# Patient Record
Sex: FEMALE | Race: WHITE | Employment: FULL TIME | URBAN - METROPOLITAN AREA
[De-identification: names, ages, dates, MRNs, and addresses within clinical notes are randomized per-mention and may not be internally consistent; named-entity substitution may affect disease eponyms.]

---

## 2017-01-12 ENCOUNTER — OFFICE VISIT (OUTPATIENT)
Dept: OBGYN | Facility: OTHER | Age: 22
End: 2017-01-12
Payer: COMMERCIAL

## 2017-01-12 VITALS
SYSTOLIC BLOOD PRESSURE: 120 MMHG | DIASTOLIC BLOOD PRESSURE: 74 MMHG | HEART RATE: 64 BPM | WEIGHT: 143 LBS | BODY MASS INDEX: 26.59 KG/M2

## 2017-01-12 DIAGNOSIS — Z97.5 PRESENCE OF INTRAUTERINE CONTRACEPTIVE DEVICE: ICD-10-CM

## 2017-01-12 DIAGNOSIS — Z30.430 ENCOUNTER FOR IUD INSERTION: Primary | ICD-10-CM

## 2017-01-12 PROCEDURE — 58300 INSERT INTRAUTERINE DEVICE: CPT | Performed by: ADVANCED PRACTICE MIDWIFE

## 2017-01-12 NOTE — PATIENT INSTRUCTIONS

## 2017-01-12 NOTE — PROGRESS NOTES
CC/HPI: Donald Duarte is a 21 year old who presents to the clinic for an IUD insertion.   Patient's last menstrual period was 12/12/2016 (approximate).. Has not been sexually active for two years.    I have reviewed the risks of the IUD including pregnancy, PID, life threatening infection, perforation, expulsion, cramping, changes in bleeding and ovarian cysts. Benefits of the IUD and alternative family planning methods have been discussed.  Patients questions have been answered.  Patient has verbalized understanding of risks and benefits and has signed the consent form.    Allergies   Allergen Reactions     No Known Allergies      Current Outpatient Prescriptions   Medication Sig Dispense Refill     levonorgestrel (MIRENA) 20 MCG/24HR IUD 1 each (20 mcg) by Intrauterine route continuous       amphetamine-dextroamphetamine (ADDERALL) 10 MG per tablet Take 1 tablet (10 mg) by mouth 2 times daily 60 tablet 0      Past Medical History   Diagnosis Date     DENISE (generalized anxiety disorder) 11/30/2013     Family History   Problem Relation Age of Onset     Hypertension Mother      Arthritis Paternal Grandmother      lupus     Breast Cancer Paternal Aunt      Social History     Social History     Marital Status: Single     Spouse Name: N/A     Number of Children: N/A     Years of Education: N/A     Occupational History     Not on file.     Social History Main Topics     Smoking status: Never Smoker      Smokeless tobacco: Never Used     Alcohol Use: No     Drug Use: No     Sexual Activity: No     Other Topics Concern     Parent/Sibling W/ Cabg, Mi Or Angioplasty Before 65f 55m? No     Social History Narrative     Past Surgical History   Procedure Laterality Date     Dental surgery       wisdom teeth         EXAM:  /74 mmHg  Pulse 64  Wt 143 lb (64.864 kg)  LMP 12/12/2016 (Approximate)  PELVIC EXAM:  Vulva: No external lesions, normal hair distribution, no adenopathy, BUS WNL  Vagina: Moist, pink, no abnormal  discharge, well rugated, no lesions  Cervix:, smooth, pink, no visible lesions, neg CMT  Uterus: Normal size, mid to retroverted, non-tender, mobile  Ovaries: No mass, non-tender, mobile  Rectal exam: deferred    IUD type: Mirena  Lot # [id number]  NDC# [id number] Mirena    EXP DATE:   2/19        Procedure:  Uterus assessed for position and is Midposition to Retroverted.  Speculum inserted.  Betadine prep of cervix done.  Tenaculum applied at  10/2  o'clock position and gentle traction was appiled to elongate the cervical canal.  Uterus sounded to 7 cm's.   IUD inserted in the usual fashion without significant resistance, severe protracted pain or excessive bleeding. The tenaculum was removed with scant bleeding from the puncture sites that was managed with some direct pressure using Sanchez swabs. Strings trimmed to 3 cm's.  Patient  tolerated the procedure well without any prolonged pain or syncopy.    ASSESSMENT/ PLAN:  (Z30.430) Encounter for IUD insertion  (primary encounter diagnosis)  Comment:   Plan: HC LEVONORGESTREL IU 52MG 5 YR, levonorgestrel         (MIRENA) 20 MCG/24HR IUD, INSERTION         INTRAUTERINE DEVICE            (Z97.5) Presence of intrauterine contraceptive device  Comment:   Plan: HC LEVONORGESTREL IU 52MG 5 YR, levonorgestrel         (MIRENA) 20 MCG/24HR IUD, INSERTION         INTRAUTERINE DEVICE                Instructions given to patient regarding checking IUD strings, returning to the clinic if pain or inability to check strings and/or irregular bleeding.  Return to the clinic in one month for IUD follow up   See AVS for complete instructions

## 2017-01-12 NOTE — MR AVS SNAPSHOT
After Visit Summary   1/12/2017    Donald Duarte    MRN: [id number]           Patient Information     Date Of Birth          1995        Visit Information        Provider Department      1/12/2017 2:00 PM [facility]     Encounter for IUD insertion    -  1     Presence of intrauterine contraceptive device           Care Instructions    What Mirena Users May Expect    What to watch for right after Mirena is placed  Some women may experience uterine cramps, bleeding, and/or dizziness during and right after Mirena is placed. To help minimize the cramps, you may taken ibuprofen 600 mg with food prior to and every 6 hours after your appointment if needed. These symptoms should improve over the next 24 hours.  Mild cramping may be present for a few days after your placement  As a follow up, you should visit your clinic once in the first 4 to 12 weeks after Mirena is placed to make sure it is in the right position. After that, Mirena can be checked once a year as part of your routine exam.    Please use a back-up method (abstinence or condoms) for 5 days after placement.    Your periods may change  For the first 3 to 6 months, your monthly period may become irregular. You may also have frequent spotting or light bleeding. A few women have heavy bleeding during this time. After your body adjusts, the number of bleeding days is likely to decrease (but may remain irregular), and you may even find that your periods stop altogether for as long as Mirena is in place. Around the end of the third month of use, you may see up to a 75% reduction in the amount of menstrual bleeding. By one year, about 1 out of 5 users may hay have no period at all. At the end of two years, 70% have little or no bleeding. Your periods will return rapidly once Mirena is removed.     Mirena Strings  You may check your own Mirena strings by inserting a finger into  "the vagina and feeling the strings as they exit the cervix.  The strings will initially feel firm, like fishing line, but will soften over a few weeks.  After the strings have softened, you or your partner should not be able to feel the strings during intercourse.  If you can feel the IUD, see your healthcare provider to have the position confirmed.  You may use tampons with Mirena in place.    Mirena does not protect against HIV or STDs.  Mirena does not prevent the formation of ovarian cysts.  Mirena does not typically reduce acne or cause weight gain or mood changes.    Please call [facility] [place] [phone] or [place] [phone] if you have questions or concerns.    For more information:  [email or web address]          Follow-ups after your visit        Who to contact     If you have questions or need follow up information about today's clinic visit or your schedule please contact [facility] directly at [phone].  Normal or non-critical lab and imaging results will be communicated to you by [employer], letter or phone within 4 business days after the clinic has received the results. If you do not hear from us within 7 days, please contact the clinic through MyChart or phone. If you have a critical or abnormal lab result, we will notify you by phone as soon as possible.  Submit refill requests through [employer] or call your pharmacy and they will forward the refill request to us. Please allow 3 business days for your refill to be completed.          Additional Information About Your Visit        [employer] Information     [employer] lets you send messages to your doctor, view your test results, renew your prescriptions, schedule appointments and more. To sign up, go to [email or web address] . Click on \"Log in\" on the left side of the screen, which will take you to the Welcome page. Then click on \"Sign up Now\" on the right side of the page.     You will be asked to enter the " access code listed below, as well as some personal information. Please follow the directions to create your username and password.     Your access code is: [email or web address]  Expires: 2017  1:29 PM     Your access code will  in 90 days. If you need help or a new code, please call your [facility] or [phone].        Care EveryWhere ID     This is your Care EveryWhere ID. This could be used by other organizations to access your [place] medical records  [id number]        Your Vitals Were     Pulse Last Period                64 2016 (Approximate)           Blood Pressure from Last 3 Encounters:   17 120/74   16 100/64   16 108/68    Weight from Last 3 Encounters:   17 143 lb (64.864 kg)   16 148 lb (67.132 kg)   16 149 lb (67.586 kg)              We Performed the Following     HC LEVONORGESTREL IU 52MG 5 YR     INSERTION INTRAUTERINE DEVICE          Today's Medication Changes          These changes are accurate as of: 17  2:35 PM.  If you have any questions, ask your nurse or doctor.               Start taking these medicines.        Dose/Directions    levonorgestrel 20 MCG/24HR IUD   Commonly known as:  MIRENA   Used for:  Encounter for IUD insertion, Presence of intrauterine contraceptive device   Started by:  Ignacia Dow APRN CNM        Dose:  1 each   1 each (20 mcg) by Intrauterine route continuous   Refills:  0            Where to get your medicines      Some of these will need a paper prescription and others can be bought over the counter.  Ask your nurse if you have questions.     You don't need a prescription for these medications    - levonorgestrel 20 MCG/24HR IUD             Primary Care Provider Office Phone # Fax #    Latoya Elena [phone]       [facility] [address]  [facility] [address]        Thank you!     Thank you for choosing [facility]  for your care. Our goal is  always to provide you with excellent care. Hearing back from our patients is one way we can continue to improve our services. Please take a few minutes to complete the written survey that you may receive in the mail after your visit with us. Thank you!             Your Updated Medication List - Protect others around you: Learn how to safely use, store and throw away your medicines at www.disposemymeds.org.          This list is accurate as of: 1/12/17  2:35 PM.  Always use your most recent med list.                   Brand Name Dispense Instructions for use    amphetamine-dextroamphetamine 10 MG per tablet    ADDERALL    60 tablet    Take 1 tablet (10 mg) by mouth 2 times daily       levonorgestrel 20 MCG/24HR IUD    MIRENA     1 each (20 mcg) by Intrauterine route continuous

## 2017-01-12 NOTE — NURSING NOTE
Patient given 800mg Ibuprofen and warm pack for cramping following IUD insertion.  Antonina Ayala CMA

## 2017-01-12 NOTE — NURSING NOTE
"Chief Complaint   Patient presents with     Contraception     would like to discuss different birth control options       Initial /74 mmHg  Pulse 64  Wt 143 lb (64.864 kg)  LMP 12/12/2016 (Approximate) Estimated body mass index is 26.59 kg/(m^2) as calculated from the following:    Height as of 12/2/16: 5' 1.5\" (1.562 m).    Weight as of this encounter: 143 lb (64.864 kg).  BP completed using cuff size: regular    No obstetric history on file.    The following HM Due: NONE      The following patient reported/Care Every where data was sent to:  P ABSTRACT QUALITY INITIATIVES [[id number]]       patient has appointment for today  Antonina Ayala CMA                   "

## 2017-02-01 ENCOUNTER — TELEPHONE (OUTPATIENT)
Dept: FAMILY MEDICINE | Facility: OTHER | Age: 22
End: 2017-02-01

## 2017-02-01 DIAGNOSIS — F90.2 ATTENTION DEFICIT HYPERACTIVITY DISORDER (ADHD), COMBINED TYPE: Primary | ICD-10-CM

## 2017-02-01 NOTE — TELEPHONE ENCOUNTER
[place]:  reviewed- Last fill was 11/4/16. She should still have a script from 12/2/16, please clarify this with her and what dose she is currently taking.     Cori Lynne, RN, BSN

## 2017-02-01 NOTE — TELEPHONE ENCOUNTER
Controlled Substance Refill Request for adderall  Problem List Complete:  No     PROVIDER TO CONSIDER COMPLETION OF PROBLEM LIST AND OVERVIEW/CONTROLLED SUBSTANCE AGREEMENT    Last Written Prescription Date:  12/02/16  Last Fill Quantity: 60,   # refills: 0    Last Office Visit with [facility] primary care provider: 12/02/16    Future Office visit:     Controlled substance agreement on file: Yes:  Date 11/04/16.     Processing:  Patient will  in clinic   checked in past 6 months?  No, route to RN

## 2017-02-01 NOTE — TELEPHONE ENCOUNTER
Reason for call:  Medication  Reason for Call:  Medication or medication refill:    Do you use a [facility]?  Name of the pharmacy and phone [facility] for the [facility] [phone]    Name of the medication requested: amphetamine-dextroamphetamine (ADDERALL) 10 MG per     Other request: patient states this medication is working very well for her.  Please call once ready for her to     Can we leave a detailed message on this number? YES    Phone number patient can be reached at: Cell number on file:    Telephone Information:   Mobile [phone]       Best Time: any    Call taken on 2/1/2017 at 1:41 PM by Kierra Del Toro

## 2017-02-01 NOTE — Clinical Note
[address]  [facility] [address] [phone]      February 7, 2017      Donald Duarte  [address] [facility] [address]        Dear Donald,    We have attempted several times by phone to give you this message: You  can  your  script for Adderall here at our clinic. I prescribed 3 months worth. If you are  still doing well after 3 months you  may repeat the refill for an additional 3 months and return to clinic in July or end of June 2017 for follow up.        Sincerely,    Nellie Kathleen CNP

## 2017-02-02 NOTE — TELEPHONE ENCOUNTER
Patient states she is currently taking 10mg BID.  [facility] pharmacist in [place] states this Rx was filled on 12/2/16.

## 2017-02-03 ENCOUNTER — TELEPHONE (OUTPATIENT)
Dept: FAMILY MEDICINE | Facility: OTHER | Age: 22
End: 2017-02-03

## 2017-02-03 RX ORDER — DEXTROAMPHETAMINE SACCHARATE, AMPHETAMINE ASPARTATE, DEXTROAMPHETAMINE SULFATE AND AMPHETAMINE SULFATE 2.5; 2.5; 2.5; 2.5 MG/1; MG/1; MG/1; MG/1
10 TABLET ORAL 2 TIMES DAILY
Qty: 180 TABLET | Refills: 0 | Status: SHIPPED | OUTPATIENT
Start: 2017-02-03 | End: 2017-07-17

## 2017-02-03 NOTE — TELEPHONE ENCOUNTER
Please let Donald know that she can  her rx for adderall here at our clinic. I prescribed 3 months worth. If she is still doing well after 3 months she may repeat the refill for an additional 3 months and return to clinic in July or end of June 2017 for follow up.   Thank you  Nellie Wang CNP

## 2017-02-03 NOTE — TELEPHONE ENCOUNTER
Adderrall encounter closed. See other telephone encounter.     Left detailed message, left rx at  for pt to .

## 2017-02-07 NOTE — TELEPHONE ENCOUNTER
Left msg for Donald to call the clinic back. 3rd attemp, letter will also be sent out, encounter will be closed.   Taylor Chaparro, CMA

## 2017-05-26 ENCOUNTER — HEALTH MAINTENANCE LETTER (OUTPATIENT)
Age: 22
End: 2017-05-26

## 2017-06-26 ENCOUNTER — OFFICE VISIT (OUTPATIENT)
Dept: FAMILY MEDICINE | Facility: OTHER | Age: 22
End: 2017-06-26
Payer: COMMERCIAL

## 2017-06-26 VITALS
RESPIRATION RATE: 16 BRPM | HEART RATE: 62 BPM | TEMPERATURE: 98.5 F | HEIGHT: 62 IN | SYSTOLIC BLOOD PRESSURE: 124 MMHG | BODY MASS INDEX: 26.02 KG/M2 | WEIGHT: 141.4 LBS | DIASTOLIC BLOOD PRESSURE: 62 MMHG

## 2017-06-26 DIAGNOSIS — F90.2 ATTENTION DEFICIT HYPERACTIVITY DISORDER (ADHD), COMBINED TYPE: Primary | ICD-10-CM

## 2017-06-26 PROCEDURE — 99213 OFFICE O/P EST LOW 20 MIN: CPT | Performed by: NURSE PRACTITIONER

## 2017-06-26 PROCEDURE — 80307 DRUG TEST PRSMV CHEM ANLYZR: CPT | Mod: 90 | Performed by: NURSE PRACTITIONER

## 2017-06-26 PROCEDURE — 99000 SPECIMEN HANDLING OFFICE-LAB: CPT | Performed by: NURSE PRACTITIONER

## 2017-06-26 RX ORDER — DEXTROAMPHETAMINE SACCHARATE, AMPHETAMINE ASPARTATE, DEXTROAMPHETAMINE SULFATE AND AMPHETAMINE SULFATE 2.5; 2.5; 2.5; 2.5 MG/1; MG/1; MG/1; MG/1
10 TABLET ORAL DAILY
Qty: 30 TABLET | Refills: 0 | Status: SHIPPED | OUTPATIENT
Start: 2017-08-20 | End: 2017-09-19

## 2017-06-26 RX ORDER — DEXTROAMPHETAMINE SACCHARATE, AMPHETAMINE ASPARTATE, DEXTROAMPHETAMINE SULFATE AND AMPHETAMINE SULFATE 2.5; 2.5; 2.5; 2.5 MG/1; MG/1; MG/1; MG/1
10 TABLET ORAL DAILY
Qty: 30 TABLET | Refills: 0 | Status: SHIPPED | OUTPATIENT
Start: 2017-07-20 | End: 2017-08-19

## 2017-06-26 RX ORDER — DEXTROAMPHETAMINE SACCHARATE, AMPHETAMINE ASPARTATE, DEXTROAMPHETAMINE SULFATE AND AMPHETAMINE SULFATE 2.5; 2.5; 2.5; 2.5 MG/1; MG/1; MG/1; MG/1
10 TABLET ORAL DAILY
Qty: 30 TABLET | Refills: 0 | Status: SHIPPED | OUTPATIENT
Start: 2017-09-20 | End: 2017-10-17 | Stop reason: DRUGHIGH

## 2017-06-26 ASSESSMENT — PAIN SCALES - GENERAL: PAINLEVEL: NO PAIN (0)

## 2017-06-26 NOTE — PROGRESS NOTES
SUBJECTIVE:                                                    Donald Duarte is a 21 year old female who presents to clinic today for the following health issues:      HPI     SUBJECTIVE:  Patient is here today to recheck ADHD/ADD.    Updates since last visit: things are going well   Routine for taking medicine, including time: 5:30 am and the 2nd around 11:30 or 12  Time medicine wears off: definitely wears off around lunch time so she takes the second dose after lunch and then the second dose wears off around 4 - 5 pm   Issues at school/Work: none   Issues at home: none  Control of symptoms: yes     Side effects:  Headaches: No  Stomach aches: No  Irritability/mood swings: No  Difficulties with sleep: No  Social withdrawal: No  Unusual movements/tics: No  Decreased appetite: Yes, makes sure to eat before taking med     Other concerns: none    Problem list and histories reviewed & adjusted, as indicated.  Additional history: as documented    Patient Active Problem List   Diagnosis     DENISE (generalized anxiety disorder)     Papule of skin     Steppage gait     Pes valgus, unspecified laterality     Anxiety     Flu vaccine need     Attention deficit hyperactivity disorder (ADHD), combined type     Need for hepatitis B screening test     Presence of intrauterine contraceptive device     Past Surgical History:   Procedure Laterality Date     DENTAL SURGERY      wisdom teeth       Social History   Substance Use Topics     Smoking status: Never Smoker     Smokeless tobacco: Never Used     Alcohol use No     Family History   Problem Relation Age of Onset     Hypertension Mother      Arthritis Paternal Grandmother      lupus     Breast Cancer Paternal Aunt          Current Outpatient Prescriptions   Medication Sig Dispense Refill     [START ON 7/20/2017] amphetamine-dextroamphetamine (ADDERALL) 10 MG per tablet Take 1 tablet (10 mg) by mouth daily 30 tablet 0     [START ON 8/20/2017] amphetamine-dextroamphetamine  "(ADDERALL) 10 MG per tablet Take 1 tablet (10 mg) by mouth daily 30 tablet 0     [START ON 9/20/2017] amphetamine-dextroamphetamine (ADDERALL) 10 MG per tablet Take 1 tablet (10 mg) by mouth daily 30 tablet 0     amphetamine-dextroamphetamine (ADDERALL) 10 MG per tablet Take 1 tablet (10 mg) by mouth 2 times daily 180 tablet 0     levonorgestrel (MIRENA) 20 MCG/24HR IUD 1 each (20 mcg) by Intrauterine route continuous       Allergies   Allergen Reactions     No Known Allergies      BP Readings from Last 3 Encounters:   06/26/17 124/62   01/12/17 120/74   12/02/16 100/64    Wt Readings from Last 3 Encounters:   06/26/17 141 lb 6.4 oz (64.1 kg)   01/12/17 143 lb (64.9 kg)   12/02/16 148 lb (67.1 kg)        Labs reviewed in EPIC    ROS:  Constitutional, HEENT, cardiovascular, pulmonary, gi and gu systems are negative, except as otherwise noted.    OBJECTIVE:     /62 (BP Location: Right arm, Patient Position: Chair, Cuff Size: Adult Regular)  Pulse 62  Temp 98.5  F (36.9  C) (Oral)  Resp 16  Ht 5' 1.5\" (1.562 m)  Wt 141 lb 6.4 oz (64.1 kg)  BMI 26.28 kg/m2  Body mass index is 26.28 kg/(m^2).  GENERAL: healthy, alert and no distress  NECK: no adenopathy, no asymmetry, masses, or scars and thyroid normal to palpation  RESP: lungs clear to auscultation - no rales, rhonchi or wheezes  CV: regular rate and rhythm, normal S1 S2, no S3 or S4, no murmur, click or rub, no peripheral edema and peripheral pulses strong  ABDOMEN: soft, nontender, no hepatosplenomegaly, no masses and bowel sounds normal  MS: no gross musculoskeletal defects noted, no edema  PSYCH: mentation appears normal, affect normal/bright    Diagnostic Test Results:  Drug tox pending urine    ASSESSMENT/PLAN:       1. Attention deficit hyperactivity disorder (ADHD), combined type  Patient has been doing well on current dosing plan. We'll continue this plan of care. Given that her urine drug screen is appropriate we'll follow up in 6 months  - Pain " Drug Scr UR W Rptd Meds  - amphetamine-dextroamphetamine (ADDERALL) 10 MG per tablet; Take 1 tablet (10 mg) by mouth daily  Dispense: 30 tablet; Refill: 0  - amphetamine-dextroamphetamine (ADDERALL) 10 MG per tablet; Take 1 tablet (10 mg) by mouth daily  Dispense: 30 tablet; Refill: 0  - amphetamine-dextroamphetamine (ADDERALL) 10 MG per tablet; Take 1 tablet (10 mg) by mouth daily  Dispense: 30 tablet; Refill: 0    See Patient Instructions    JAMES Rivas [facility]

## 2017-06-26 NOTE — NURSING NOTE
"Chief Complaint   Patient presents with     A.D.H.D       Initial /62 (BP Location: Right arm, Patient Position: Chair, Cuff Size: Adult Regular)  Pulse 62  Temp 98.5  F (36.9  C) (Oral)  Resp 16  Ht 5' 1.5\" (1.562 m)  Wt 141 lb 6.4 oz (64.1 kg)  BMI 26.28 kg/m2 Estimated body mass index is 26.28 kg/(m^2) as calculated from the following:    Height as of this encounter: 5' 1.5\" (1.562 m).    Weight as of this encounter: 141 lb 6.4 oz (64.1 kg).  Medication Reconciliation: complete      "

## 2017-06-26 NOTE — MR AVS SNAPSHOT
"              After Visit Summary   2017    Donald Duarte    MRN: [id number]           Patient Information     Date Of Birth          1995        Visit Information        Provider Department      2017 3:10 PM Nellie Wang APRN CNP [facility]        Today's Diagnoses     Attention deficit hyperactivity disorder (ADHD), combined type    -  1      Care Instructions    Please follow up with me in clinic in 6 months.     Thank you   Nellie Wang CNP            Follow-ups after your visit        Who to contact     If you have questions or need follow up information about today's clinic visit or your schedule please contact [facility] directly at [phone].  Normal or non-critical lab and imaging results will be communicated to you by MyChart, letter or phone within 4 business days after the clinic has received the results. If you do not hear from us within 7 days, please contact the clinic through MyChart or phone. If you have a critical or abnormal lab result, we will notify you by phone as soon as possible.  Submit refill requests through [employer] or call your pharmacy and they will forward the refill request to us. Please allow 3 business days for your refill to be completed.          Additional Information About Your Visit        MyChart Information     [employer] lets you send messages to your doctor, view your test results, renew your prescriptions, schedule appointments and more. To sign up, go to [email or web address] . Click on \"Log in\" on the left side of the screen, which will take you to the Welcome page. Then click on \"Sign up Now\" on the right side of the page.     You will be asked to enter the access code listed below, as well as some personal information. Please follow the directions to create your username and password.     Your access code is: [email or web address]  Expires: 2017  1:57 PM     Your access code will  in 90 days. If you " "need help or a new code, please call your [place] clinic or [phone].        Care EveryWhere ID     This is your Care EveryWhere ID. This could be used by other organizations to access your [place] medical records  [id number]        Your Vitals Were     Pulse Temperature Respirations Height BMI (Body Mass Index)       62 98.5  F (36.9  C) (Oral) 16 5' 1.5\" (1.562 m) 26.28 kg/m2        Blood Pressure from Last 3 Encounters:   06/26/17 124/62   01/12/17 120/74   12/02/16 100/64    Weight from Last 3 Encounters:   06/26/17 141 lb 6.4 oz (64.1 kg)   01/12/17 143 lb (64.9 kg)   12/02/16 148 lb (67.1 kg)              We Performed the Following     Pain Drug Scr UR W Rptd Meds          Today's Medication Changes          These changes are accurate as of: 6/26/17  3:56 PM.  If you have any questions, ask your nurse or doctor.               These medicines have changed or have updated prescriptions.        Dose/Directions    * amphetamine-dextroamphetamine 10 MG per tablet   Commonly known as:  ADDERALL   This may have changed:  Another medication with the same name was added. Make sure you understand how and when to take each.   Used for:  Attention deficit hyperactivity disorder (ADHD), combined type   Changed by:  Nellie Wang APRN CNP        Dose:  10 mg   Take 1 tablet (10 mg) by mouth 2 times daily   Quantity:  180 tablet   Refills:  0       * amphetamine-dextroamphetamine 10 MG per tablet   Commonly known as:  ADDERALL   This may have changed:  You were already taking a medication with the same name, and this prescription was added. Make sure you understand how and when to take each.   Used for:  Attention deficit hyperactivity disorder (ADHD), combined type   Changed by:  Nellie Wang APRN CNP        Dose:  10 mg   Start taking on:  7/20/2017   Take 1 tablet (10 mg) by mouth daily   Quantity:  30 tablet   Refills:  0       * amphetamine-dextroamphetamine 10 MG per tablet   Commonly " known as:  ADDERALL   This may have changed:  You were already taking a medication with the same name, and this prescription was added. Make sure you understand how and when to take each.   Used for:  Attention deficit hyperactivity disorder (ADHD), combined type   Changed by:  Nellie Wang APRN CNP        Dose:  10 mg   Start taking on:  8/20/2017   Take 1 tablet (10 mg) by mouth daily   Quantity:  30 tablet   Refills:  0       * amphetamine-dextroamphetamine 10 MG per tablet   Commonly known as:  ADDERALL   This may have changed:  You were already taking a medication with the same name, and this prescription was added. Make sure you understand how and when to take each.   Used for:  Attention deficit hyperactivity disorder (ADHD), combined type   Changed by:  Nellie Wang APRN CNP        Dose:  10 mg   Start taking on:  9/20/2017   Take 1 tablet (10 mg) by mouth daily   Quantity:  30 tablet   Refills:  0       * Notice:  This list has 4 medication(s) that are the same as other medications prescribed for you. Read the directions carefully, and ask your doctor or other care provider to review them with you.         Where to get your medicines      Some of these will need a paper prescription and others can be bought over the counter.  Ask your nurse if you have questions.     Bring a paper prescription for each of these medications     amphetamine-dextroamphetamine 10 MG per tablet    amphetamine-dextroamphetamine 10 MG per tablet    amphetamine-dextroamphetamine 10 MG per tablet                Primary Care Provider Office Phone # Fax #    Latoya Elena [phone]       [facility] [address]  [facility] [address]        Equal Access to Services     [facility] AH: Lyudmila mariscal Sotata, walorene luqcornelio, qaybta kadelio obando. [facility] [phone].    ATENCIÓN: Si vern aldrich  disposición servicios gratuitos de asistencia lingüística. Jignesh torres [phone].    We comply with applicable federal civil rights laws and [place] laws. We do not discriminate on the basis of race, color, national origin, age, disability sex, sexual orientation or gender identity.            Thank you!     Thank you for choosing [facility]  for your care. Our goal is always to provide you with excellent care. Hearing back from our patients is one way we can continue to improve our services. Please take a few minutes to complete the written survey that you may receive in the mail after your visit with us. Thank you!             Your Updated Medication List - Protect others around you: Learn how to safely use, store and throw away your medicines at www.disposemymeds.org.          This list is accurate as of: 6/26/17  3:56 PM.  Always use your most recent med list.                   Brand Name Dispense Instructions for use Diagnosis    * amphetamine-dextroamphetamine 10 MG per tablet    ADDERALL    180 tablet    Take 1 tablet (10 mg) by mouth 2 times daily    Attention deficit hyperactivity disorder (ADHD), combined type       * amphetamine-dextroamphetamine 10 MG per tablet   Start taking on:  7/20/2017    ADDERALL    30 tablet    Take 1 tablet (10 mg) by mouth daily    Attention deficit hyperactivity disorder (ADHD), combined type       * amphetamine-dextroamphetamine 10 MG per tablet   Start taking on:  8/20/2017    ADDERALL    30 tablet    Take 1 tablet (10 mg) by mouth daily    Attention deficit hyperactivity disorder (ADHD), combined type       * amphetamine-dextroamphetamine 10 MG per tablet   Start taking on:  9/20/2017    ADDERALL    30 tablet    Take 1 tablet (10 mg) by mouth daily    Attention deficit hyperactivity disorder (ADHD), combined type       levonorgestrel 20 MCG/24HR IUD    MIRENA     1 each (20 mcg) by Intrauterine route continuous    Encounter for IUD insertion, Presence of  intrauterine contraceptive device       * Notice:  This list has 4 medication(s) that are the same as other medications prescribed for you. Read the directions carefully, and ask your doctor or other care provider to review them with you.

## 2017-07-04 LAB — PAIN DRUG SCR UR W RPTD MEDS: NORMAL

## 2017-07-14 NOTE — PROGRESS NOTES
SUBJECTIVE:                                                    Donald Duarte is a 21 year old female who presents to clinic today for the following health issues:      HPI    Medication Followup of Adderall    Taking Medication as prescribed: yes    Side Effects:  None    Medication Helping Symptoms:  yes   Patient states she is currently taking Adderall regular strength at 10 mg in the morning and 10 mg in the afternoon. She feels that her afternoon dose is wearing off around 5 or 6 PM and that she sometimes feels very emotional when this is wearing off. Mother stated to her that she seems introverted and down when she gets home from work.    Answers for HPI/ROS submitted by the patient on 7/17/2017   DENISE 7 TOTAL SCORE: 4      Patient states she is going to be starting school early this fall and is in need of immunization record which is not up-to-date at this point. She is going to check with her mother to see if there is some place that she may have had her immunizations done. The [facility] was checked.  We'll have labs drawn today that she needs for school related to her immunity.    Problem list and histories reviewed & adjusted, as indicated.  Additional history: as documented        BP Readings from Last 3 Encounters:   07/17/17 106/69   06/26/17 124/62   01/12/17 120/74    Wt Readings from Last 3 Encounters:   07/17/17 143 lb (64.9 kg)   06/26/17 141 lb 6.4 oz (64.1 kg)   01/12/17 143 lb (64.9 kg)                  Labs reviewed in EPIC    ROS:  Constitutional, HEENT, cardiovascular, pulmonary, gi and gu systems are negative, except as otherwise noted.    OBJECTIVE:     /69 (BP Location: Right arm, Patient Position: Chair, Cuff Size: Adult Regular)  Pulse 85  Temp 98.5  F (36.9  C) (Oral)  Resp 16  Wt 143 lb (64.9 kg)  SpO2 97%  BMI 26.58 kg/m2  Body mass index is 26.58 kg/(m^2).  GENERAL: healthy, alert and no distress  NECK: no adenopathy, no asymmetry, masses, or scars and thyroid normal to  palpation  RESP: lungs clear to auscultation - no rales, rhonchi or wheezes  CV: regular rate and rhythm, normal S1 S2, no S3 or S4, no murmur, click or rub, no peripheral edema and peripheral pulses strong  ABDOMEN: soft, nontender, no hepatosplenomegaly, no masses and bowel sounds normal  MS: no gross musculoskeletal defects noted, no edema  PSYCH: mentation appears normal, affect normal/bright    Diagnostic Test Results: pending    ASSESSMENT/PLAN:     1. Attention deficit hyperactivity disorder (ADHD), combined type  We'll have her start Adderall extended release at 20 mg every a.m. she usually gets up around 5:30. She will then take her Adderall regular dosing in the afternoon which should hold her a little bit later into the evening. If needed we can increase her Adderall XR to a max dose of 40 mg per day.  - amphetamine-dextroamphetamine (ADDERALL XR) 20 MG per 24 hr capsule; Take 1 capsule (20 mg) by mouth daily  Dispense: 30 capsule; Refill: 0  - amphetamine-dextroamphetamine (ADDERALL XR) 20 MG per 24 hr capsule; Take 1 capsule (20 mg) by mouth daily  Dispense: 30 capsule; Refill: 0  - amphetamine-dextroamphetamine (ADDERALL XR) 20 MG per 24 hr capsule; Take 1 capsule (20 mg) by mouth daily  Dispense: 30 capsule; Refill: 0    2. Screening examination for venereal disease  She is due for screening we'll add this to her lab profile today  - Chlamydia trachomatis PCR    3. Screening for measles  This is for school  - Rubeola Antibody IgG    4. Screening for endocrine/metabolic/immunity disorders  This is for school  - Rubeola Antibody IgG  - Mumps Antibody IgG  - Rubella Antibody IgG Quantitative  - Hepatitis B surface antigen      Home care instructions were reviewed with the patient. The risks, benefits and treatment options of prescribed medications or other treatments have been discussed with the patient. The patient verbalized their understanding and should call or follow up if no improvement or if they  develop further problems.    Patient Instructions   I will call you with lab results.     Please let us know when you find out about your immunization record and we will update your record here.     Please call clinic if continue to have symptoms and need to increase XR dose to 40 mg (this is max dose).    Thank you  JAMES Higgins CNP [facility]

## 2017-07-17 ENCOUNTER — OFFICE VISIT (OUTPATIENT)
Dept: FAMILY MEDICINE | Facility: OTHER | Age: 22
End: 2017-07-17
Payer: COMMERCIAL

## 2017-07-17 VITALS
BODY MASS INDEX: 26.58 KG/M2 | RESPIRATION RATE: 16 BRPM | SYSTOLIC BLOOD PRESSURE: 106 MMHG | HEART RATE: 85 BPM | DIASTOLIC BLOOD PRESSURE: 69 MMHG | OXYGEN SATURATION: 97 % | TEMPERATURE: 98.5 F | WEIGHT: 143 LBS

## 2017-07-17 DIAGNOSIS — Z13.228 SCREENING FOR ENDOCRINE/METABOLIC/IMMUNITY DISORDERS: ICD-10-CM

## 2017-07-17 DIAGNOSIS — Z11.59 SCREENING FOR MEASLES: ICD-10-CM

## 2017-07-17 DIAGNOSIS — Z13.0 SCREENING FOR ENDOCRINE/METABOLIC/IMMUNITY DISORDERS: ICD-10-CM

## 2017-07-17 DIAGNOSIS — Z13.29 SCREENING FOR ENDOCRINE/METABOLIC/IMMUNITY DISORDERS: ICD-10-CM

## 2017-07-17 DIAGNOSIS — Z11.3 SCREENING EXAMINATION FOR VENEREAL DISEASE: ICD-10-CM

## 2017-07-17 DIAGNOSIS — F90.2 ATTENTION DEFICIT HYPERACTIVITY DISORDER (ADHD), COMBINED TYPE: Primary | ICD-10-CM

## 2017-07-17 PROCEDURE — 86762 RUBELLA ANTIBODY: CPT | Performed by: NURSE PRACTITIONER

## 2017-07-17 PROCEDURE — 87340 HEPATITIS B SURFACE AG IA: CPT | Performed by: NURSE PRACTITIONER

## 2017-07-17 PROCEDURE — 86765 RUBEOLA ANTIBODY: CPT | Performed by: NURSE PRACTITIONER

## 2017-07-17 PROCEDURE — 87491 CHLMYD TRACH DNA AMP PROBE: CPT | Performed by: NURSE PRACTITIONER

## 2017-07-17 PROCEDURE — 99214 OFFICE O/P EST MOD 30 MIN: CPT | Performed by: NURSE PRACTITIONER

## 2017-07-17 PROCEDURE — 86735 MUMPS ANTIBODY: CPT | Performed by: NURSE PRACTITIONER

## 2017-07-17 PROCEDURE — 36415 COLL VENOUS BLD VENIPUNCTURE: CPT | Performed by: NURSE PRACTITIONER

## 2017-07-17 RX ORDER — DEXTROAMPHETAMINE SACCHARATE, AMPHETAMINE ASPARTATE MONOHYDRATE, DEXTROAMPHETAMINE SULFATE AND AMPHETAMINE SULFATE 5; 5; 5; 5 MG/1; MG/1; MG/1; MG/1
20 CAPSULE, EXTENDED RELEASE ORAL DAILY
Qty: 30 CAPSULE | Refills: 0 | Status: SHIPPED | OUTPATIENT
Start: 2017-07-17 | End: 2017-08-16

## 2017-07-17 RX ORDER — DEXTROAMPHETAMINE SACCHARATE, AMPHETAMINE ASPARTATE MONOHYDRATE, DEXTROAMPHETAMINE SULFATE AND AMPHETAMINE SULFATE 5; 5; 5; 5 MG/1; MG/1; MG/1; MG/1
20 CAPSULE, EXTENDED RELEASE ORAL DAILY
Qty: 30 CAPSULE | Refills: 0 | Status: SHIPPED | OUTPATIENT
Start: 2017-08-17 | End: 2017-09-16

## 2017-07-17 RX ORDER — DEXTROAMPHETAMINE SACCHARATE, AMPHETAMINE ASPARTATE MONOHYDRATE, DEXTROAMPHETAMINE SULFATE AND AMPHETAMINE SULFATE 5; 5; 5; 5 MG/1; MG/1; MG/1; MG/1
20 CAPSULE, EXTENDED RELEASE ORAL DAILY
Qty: 30 CAPSULE | Refills: 0 | Status: SHIPPED | OUTPATIENT
Start: 2017-09-17 | End: 2017-10-17

## 2017-07-17 ASSESSMENT — ANXIETY QUESTIONNAIRES
7. FEELING AFRAID AS IF SOMETHING AWFUL MIGHT HAPPEN: NOT AT ALL
GAD7 TOTAL SCORE: 4
GAD7 TOTAL SCORE: 4
5. BEING SO RESTLESS THAT IT IS HARD TO SIT STILL: SEVERAL DAYS
2. NOT BEING ABLE TO STOP OR CONTROL WORRYING: NOT AT ALL
3. WORRYING TOO MUCH ABOUT DIFFERENT THINGS: SEVERAL DAYS
6. BECOMING EASILY ANNOYED OR IRRITABLE: SEVERAL DAYS
7. FEELING AFRAID AS IF SOMETHING AWFUL MIGHT HAPPEN: NOT AT ALL
4. TROUBLE RELAXING: SEVERAL DAYS
1. FEELING NERVOUS, ANXIOUS, OR ON EDGE: NOT AT ALL
GAD7 TOTAL SCORE: 4

## 2017-07-17 ASSESSMENT — PAIN SCALES - GENERAL: PAINLEVEL: NO PAIN (0)

## 2017-07-17 ASSESSMENT — PATIENT HEALTH QUESTIONNAIRE - PHQ9
SUM OF ALL RESPONSES TO PHQ QUESTIONS 1-9: 7
SUM OF ALL RESPONSES TO PHQ QUESTIONS 1-9: 7

## 2017-07-17 NOTE — NURSING NOTE
"Chief Complaint   Patient presents with     A.D.H.D     Health Maintenance     mychart, chlamydia, ciro       Initial /69 (BP Location: Right arm, Patient Position: Chair, Cuff Size: Adult Regular)  Pulse 85  Temp 98.5  F (36.9  C) (Oral)  Resp 16  Wt 143 lb (64.9 kg)  SpO2 97%  BMI 26.58 kg/m2 Estimated body mass index is 26.58 kg/(m^2) as calculated from the following:    Height as of 6/26/17: 5' 1.5\" (1.562 m).    Weight as of this encounter: 143 lb (64.9 kg).  Medication Reconciliation: complete     Monique Mac CMA      "

## 2017-07-17 NOTE — MR AVS SNAPSHOT
After Visit Summary   7/17/2017    Donald Duarte    MRN: [id number]           Patient Information     Date Of Birth          1995        Visit Information        Provider Department      7/17/2017 2:30 PM Nellie Wang APRN CNP [facility]        Today's Diagnoses     Screening examination for venereal disease    -  1    Attention deficit hyperactivity disorder (ADHD), combined type        Screening for measles        Screening for endocrine/metabolic/immunity disorders          Care Instructions    I will call you with lab results.     Please let us know when you find out about your immunization record and we will update your record here.     Please call clinic if continue to have symptoms and need to increase XR dose to 40 mg (this is max dose).    Thank you  Nellie Wang CNP            Follow-ups after your visit        Your next 10 appointments already scheduled     Jul 17, 2017  2:30 PM CDT   [facility] with JAMES Durbin CNP   [facility]    [address] [place] [id number]   [phone]           Bring a current list of meds and any records pertaining to this visit.  For Physicals, please bring immunization records and any forms needing to be filled out.  Please arrive 10 minutes early to complete paperwork.              Who to contact     If you have questions or need follow up information about today's clinic visit or your schedule please contact [facility] directly at [phone].  Normal or non-critical lab and imaging results will be communicated to you by MyChart, letter or phone within 4 business days after the clinic has received the results. If you do not hear from us within 7 days, please contact the clinic through MyChart or phone. If you have a critical or abnormal lab result, we will notify you by phone as soon as possible.  Submit refill requests  "through [employer] or call your pharmacy and they will forward the refill request to us. Please allow 3 business days for your refill to be completed.          Additional Information About Your Visit        [employer] Information     [employer] lets you send messages to your doctor, view your test results, renew your prescriptions, schedule appointments and more. To sign up, go to [email or web address] . Click on \"Log in\" on the left side of the screen, which will take you to the Welcome page. Then click on \"Sign up Now\" on the right side of the page.     You will be asked to enter the access code listed below, as well as some personal information. Please follow the directions to create your username and password.     Your access code is: [email or web address]  Expires: 2017  1:57 PM     Your access code will  in 90 days. If you need help or a new code, please call your [place] clinic or [phone].        Care EveryWhere ID     This is your Care EveryWhere ID. This could be used by other organizations to access your [place] medical records  [id number]        Your Vitals Were     Pulse Temperature Respirations Pulse Oximetry BMI (Body Mass Index)       85 98.5  F (36.9  C) (Oral) 16 97% 26.58 kg/m2        Blood Pressure from Last 3 Encounters:   17 106/69   17 124/62   17 120/74    Weight from Last 3 Encounters:   17 143 lb (64.9 kg)   17 141 lb 6.4 oz (64.1 kg)   17 143 lb (64.9 kg)              We Performed the Following     Chlamydia trachomatis PCR     Hepatitis B surface antigen     Mumps Antibody IgG     Rubella Antibody IgG Quantitative     Rubeola Antibody IgG          Today's Medication Changes          These changes are accurate as of: 17  2:26 PM.  If you have any questions, ask your nurse or doctor.               These medicines have changed or have updated prescriptions.        Dose/Directions    * amphetamine-dextroamphetamine 20 MG per 24 hr capsule   Commonly " known as:  ADDERALL XR   This may have changed:  You were already taking a medication with the same name, and this prescription was added. Make sure you understand how and when to take each.   Used for:  Attention deficit hyperactivity disorder (ADHD), combined type   Changed by:  Nellie Wang APRN CNP        Dose:  20 mg   Take 1 capsule (20 mg) by mouth daily   Quantity:  30 capsule   Refills:  0       * amphetamine-dextroamphetamine 10 MG per tablet   Commonly known as:  ADDERALL   This may have changed:  Another medication with the same name was added. Make sure you understand how and when to take each.   Used for:  Attention deficit hyperactivity disorder (ADHD), combined type   Changed by:  Nellie Wang APRN CNP        Dose:  10 mg   Start taking on:  7/20/2017   Take 1 tablet (10 mg) by mouth daily   Quantity:  30 tablet   Refills:  0       * amphetamine-dextroamphetamine 20 MG per 24 hr capsule   Commonly known as:  ADDERALL XR   This may have changed:  You were already taking a medication with the same name, and this prescription was added. Make sure you understand how and when to take each.   Used for:  Attention deficit hyperactivity disorder (ADHD), combined type   Changed by:  Nellie Wang APRN CNP        Dose:  20 mg   Start taking on:  8/17/2017   Take 1 capsule (20 mg) by mouth daily   Quantity:  30 capsule   Refills:  0       * amphetamine-dextroamphetamine 10 MG per tablet   Commonly known as:  ADDERALL   This may have changed:  Another medication with the same name was added. Make sure you understand how and when to take each.   Used for:  Attention deficit hyperactivity disorder (ADHD), combined type   Changed by:  Nellie Wang APRN CNP        Dose:  10 mg   Start taking on:  8/20/2017   Take 1 tablet (10 mg) by mouth daily   Quantity:  30 tablet   Refills:  0       * amphetamine-dextroamphetamine 20 MG per 24 hr capsule   Commonly known as:   ADDERALL XR   This may have changed:  You were already taking a medication with the same name, and this prescription was added. Make sure you understand how and when to take each.   Used for:  Attention deficit hyperactivity disorder (ADHD), combined type   Changed by:  Nellie Wang APRN CNP        Dose:  20 mg   Start taking on:  9/17/2017   Take 1 capsule (20 mg) by mouth daily   Quantity:  30 capsule   Refills:  0       * amphetamine-dextroamphetamine 10 MG per tablet   Commonly known as:  ADDERALL   This may have changed:  Another medication with the same name was added. Make sure you understand how and when to take each.   Used for:  Attention deficit hyperactivity disorder (ADHD), combined type   Changed by:  Nellie Wang APRN CNP        Dose:  10 mg   Start taking on:  9/20/2017   Take 1 tablet (10 mg) by mouth daily   Quantity:  30 tablet   Refills:  0       * Notice:  This list has 6 medication(s) that are the same as other medications prescribed for you. Read the directions carefully, and ask your doctor or other care provider to review them with you.         Where to get your medicines      Some of these will need a paper prescription and others can be bought over the counter.  Ask your nurse if you have questions.     Bring a paper prescription for each of these medications     amphetamine-dextroamphetamine 20 MG per 24 hr capsule    amphetamine-dextroamphetamine 20 MG per 24 hr capsule    amphetamine-dextroamphetamine 20 MG per 24 hr capsule                Primary Care Provider Office Phone # Fax #    Latoya Elena [phone]       [address]  [facility] [address]        Equal Access to Services     [facility]: Lyudmila Quezada, ji serrano, qadelio betancourt. [facility] [phone].    ATENCIÓN: Si habla español, tiene a rosario disposición servicios  elena de asistencia lingüística. Jignesh torres [phone].    We comply with applicable federal civil rights laws and [place] laws. We do not discriminate on the basis of race, color, national origin, age, disability sex, sexual orientation or gender identity.            Thank you!     Thank you for choosing [facility]  for your care. Our goal is always to provide you with excellent care. Hearing back from our patients is one way we can continue to improve our services. Please take a few minutes to complete the written survey that you may receive in the mail after your visit with us. Thank you!             Your Updated Medication List - Protect others around you: Learn how to safely use, store and throw away your medicines at www.disposemymeds.org.          This list is accurate as of: 7/17/17  2:26 PM.  Always use your most recent med list.                   Brand Name Dispense Instructions for use Diagnosis    * amphetamine-dextroamphetamine 20 MG per 24 hr capsule    ADDERALL XR    30 capsule    Take 1 capsule (20 mg) by mouth daily    Attention deficit hyperactivity disorder (ADHD), combined type       * amphetamine-dextroamphetamine 10 MG per tablet   Start taking on:  7/20/2017    ADDERALL    30 tablet    Take 1 tablet (10 mg) by mouth daily    Attention deficit hyperactivity disorder (ADHD), combined type       * amphetamine-dextroamphetamine 20 MG per 24 hr capsule   Start taking on:  8/17/2017    ADDERALL XR    30 capsule    Take 1 capsule (20 mg) by mouth daily    Attention deficit hyperactivity disorder (ADHD), combined type       * amphetamine-dextroamphetamine 10 MG per tablet   Start taking on:  8/20/2017    ADDERALL    30 tablet    Take 1 tablet (10 mg) by mouth daily    Attention deficit hyperactivity disorder (ADHD), combined type       * amphetamine-dextroamphetamine 20 MG per 24 hr capsule   Start taking on:  9/17/2017    ADDERALL XR    30 capsule    Take 1 capsule (20 mg) by  mouth daily    Attention deficit hyperactivity disorder (ADHD), combined type       * amphetamine-dextroamphetamine 10 MG per tablet   Start taking on:  9/20/2017    ADDERALL    30 tablet    Take 1 tablet (10 mg) by mouth daily    Attention deficit hyperactivity disorder (ADHD), combined type       levonorgestrel 20 MCG/24HR IUD    MIRENA     1 each (20 mcg) by Intrauterine route continuous    Encounter for IUD insertion, Presence of intrauterine contraceptive device       * Notice:  This list has 6 medication(s) that are the same as other medications prescribed for you. Read the directions carefully, and ask your doctor or other care provider to review them with you.

## 2017-07-17 NOTE — PATIENT INSTRUCTIONS
I will call you with lab results.     Please let us know when you find out about your immunization record and we will update your record here.     Please call clinic if continue to have symptoms and need to increase XR dose to 40 mg (this is max dose).    Thank you  Nellie Wang CNP

## 2017-07-18 LAB — HBV SURFACE AG SERPL QL IA: NONREACTIVE

## 2017-07-18 ASSESSMENT — ANXIETY QUESTIONNAIRES: GAD7 TOTAL SCORE: 4

## 2017-07-18 ASSESSMENT — PATIENT HEALTH QUESTIONNAIRE - PHQ9: SUM OF ALL RESPONSES TO PHQ QUESTIONS 1-9: 7

## 2017-07-19 LAB
C TRACH DNA SPEC QL NAA+PROBE: NORMAL
MEV IGG SER QL IA: 1.4 AI (ref 0–0.8)
MUV IGG SER QL IA: 3.7 AI (ref 0–0.8)
RUBV IGG SERPL IA-ACNC: 119 IU/ML
SPECIMEN SOURCE: NORMAL

## 2017-07-20 DIAGNOSIS — Z23 NEED FOR VACCINATION: Primary | ICD-10-CM

## 2017-07-20 NOTE — PROGRESS NOTES
Please advise Donald Duarte,  1995,  [phone] (home)     Her immunizations were all good except she is non-reactive to hepatitis B this mean she has not been exposed to virus (has not been vaccinated) recommend vaccination for hepatits b.   Chlamydia was negative.   Thank you  Nellie Wang CNP

## 2017-07-20 NOTE — PROGRESS NOTES
Please advise Donald Duarte,  1995,  [phone] (home)     Her immunizations were all good except she is non-reactive to hepatitis B this mean she has not been exposed to virus (has not been vaccinated) recommend vaccination for hepatits b.   Chlamydia was negative.   Thank you  Nellie Wang CNP

## 2017-10-06 ENCOUNTER — HEALTH MAINTENANCE LETTER (OUTPATIENT)
Age: 22
End: 2017-10-06

## 2017-10-16 NOTE — PROGRESS NOTES
SUBJECTIVE:                                                    Donald Duarte is a 22 year old female who presents to clinic today for the following health issues:      HPI    Concern - Toe nail pain   Onset: for a while now     Description:   Big toe is ok but the other toes nails hurt on both feet. No recalled injury     ADHD follow up  SUBJECTIVE:  Patient is here today to recheck ADHD/ADD.    Updates since last visit: none   Routine for taking medicine, including time: 5 am and takes the 5mg around noon (if needed)  Time medicine wears off: 7pm if taking 2nd dose.  If just taking one dose then around 4pm  Issues at school/Work: none   Issues at home: none   Control of symptoms: well controlled     Side effects:  Headaches: No  Stomach aches: No  Irritability/mood swings: No  Difficulties with sleep: No  Social withdrawal: No  Unusual movements/tics: No  Decreased appetite: No    Other concerns: itchy eyes and season allergy symptoms         Problem list and histories reviewed & adjusted, as indicated.  Additional history: as documented    Patient Active Problem List   Diagnosis     DENISE (generalized anxiety disorder)     Papule of skin     Steppage gait     Pes valgus, unspecified laterality     Anxiety     Flu vaccine need     Attention deficit hyperactivity disorder (ADHD), combined type     Need for hepatitis B screening test     Presence of intrauterine contraceptive device     Past Surgical History:   Procedure Laterality Date     DENTAL SURGERY      wisdom teeth       Social History   Substance Use Topics     Smoking status: Never Smoker     Smokeless tobacco: Never Used     Alcohol use No     Family History   Problem Relation Age of Onset     Hypertension Mother      Arthritis Paternal Grandmother      lupus     Breast Cancer Paternal Aunt          Current Outpatient Prescriptions   Medication Sig Dispense Refill     terbinafine (KP TERBINAFINE HYDROCHLORIDE) 1 % cream Apply topically 2 times daily  for 14 days 15 g 1     [START ON 10/31/2017] amphetamine-dextroamphetamine (ADDERALL XR) 20 MG per 24 hr capsule Take 1 capsule (20 mg) by mouth daily 30 capsule 0     [START ON 12/1/2017] amphetamine-dextroamphetamine (ADDERALL XR) 20 MG per 24 hr capsule Take 1 capsule (20 mg) by mouth daily 30 capsule 0     [START ON 12/31/2017] amphetamine-dextroamphetamine (ADDERALL XR) 20 MG per 24 hr capsule Take 1 capsule (20 mg) by mouth daily 30 capsule 0     loratadine (CLARITIN) 10 MG tablet Take 1 tablet (10 mg) by mouth daily 30 tablet 1     Phenylephrine-Polyvinyl Alc (REFRESH REDNESS RELIEF) 0.12-1.4 % SOLN Apply 1 drop to eye 2 times daily as needed 1 Bottle 0     amphetamine-dextroamphetamine (ADDERALL XR) 20 MG per 24 hr capsule Take 1 capsule (20 mg) by mouth daily 30 capsule 0     levonorgestrel (MIRENA) 20 MCG/24HR IUD 1 each (20 mcg) by Intrauterine route continuous       BP Readings from Last 3 Encounters:   10/17/17 118/70   07/17/17 106/69   06/26/17 124/62    Wt Readings from Last 3 Encounters:   10/17/17 137 lb (62.1 kg)   07/17/17 143 lb (64.9 kg)   06/26/17 141 lb 6.4 oz (64.1 kg)        Labs reviewed in EPIC        ROS:  Constitutional, HEENT, cardiovascular, pulmonary, gi and gu systems are negative, except as otherwise noted.      OBJECTIVE:   /70 (BP Location: Left arm, Patient Position: Chair, Cuff Size: Adult Large)  Pulse 68  Temp 98.6  F (37  C) (Oral)  Resp 16  Wt 137 lb (62.1 kg)  BMI 25.47 kg/m2  Body mass index is 25.47 kg/(m^2).  GENERAL: healthy, alert and no distress  NECK: no adenopathy, no asymmetry, masses, or scars and thyroid normal to palpation  RESP: lungs clear to auscultation - no rales, rhonchi or wheezes  CV: regular rate and rhythm, normal S1 S2, no S3 or S4, no murmur, click or rub, no peripheral edema and peripheral pulses strong  ABDOMEN: soft, nontender, no hepatosplenomegaly, no masses and bowel sounds normal  MS: no gross musculoskeletal defects noted, no  edema  SKIN: tinea unguium bilateral toes  NEURO: Normal strength and tone, mentation intact and speech normal  PSYCH: mentation appears normal, affect normal/bright    Diagnostic Test Results:  none     ASSESSMENT/PLAN:     1. Attention deficit hyperactivity disorder (ADHD), combined type  Doing well will discontinue the 10 mg dose she had been taking midday.   - amphetamine-dextroamphetamine (ADDERALL XR) 20 MG per 24 hr capsule; Take 1 capsule (20 mg) by mouth daily  Dispense: 30 capsule; Refill: 0  - amphetamine-dextroamphetamine (ADDERALL XR) 20 MG per 24 hr capsule; Take 1 capsule (20 mg) by mouth daily  Dispense: 30 capsule; Refill: 0  - amphetamine-dextroamphetamine (ADDERALL XR) 20 MG per 24 hr capsule; Take 1 capsule (20 mg) by mouth daily  Dispense: 30 capsule; Refill: 0    2. Tinea unguium  Discussed treatment will try topical first if no relief of symptoms may need oral.   - terbinafine (KP TERBINAFINE HYDROCHLORIDE) 1 % cream; Apply topically 2 times daily for 14 days  Dispense: 15 g; Refill: 1    3. Chronic seasonal allergic rhinitis, unspecified trigger  Has taken Zyrtec OTC for multiple years and states that she does not feel this is helping anymore. Would like to try something else. She has been having itchy eyes. She states that in the morning and she wakes up its mainly her eyes that are itchy and then as she was on the third day she starts to have nasal drainage and sinusitis. At nighttime when she goes to bed she has stuffy nose. Recommend Claritin for daytime with refresh eyedrops and may take 1 Benadryl 25 mg tablet or half tablet at bedtime.  - loratadine (CLARITIN) 10 MG tablet; Take 1 tablet (10 mg) by mouth daily  Dispense: 30 tablet; Refill: 1  - Phenylephrine-Polyvinyl Alc (REFRESH REDNESS RELIEF) 0.12-1.4 % SOLN; Apply 1 drop to eye 2 times daily as needed  Dispense: 1 Bottle; Refill: 0    4. Need for prophylactic vaccination and inoculation against influenza    - FLU VAC, SPLIT VIRUS  IM > 3 YO (QUADRIVALENT) [65005]  - Vaccine Administration, Initial [72608]    Patient Instructions   Please start topical fungal medicine for toe nail pain. If symptoms do not improve or worsen may need to try oral.     Refills given with decreased rx for adderall.    Return to clinic for PAP when able.     Thank you  Nellie Wang, [facility]

## 2017-10-17 ENCOUNTER — OFFICE VISIT (OUTPATIENT)
Dept: FAMILY MEDICINE | Facility: OTHER | Age: 22
End: 2017-10-17
Payer: COMMERCIAL

## 2017-10-17 VITALS
SYSTOLIC BLOOD PRESSURE: 118 MMHG | RESPIRATION RATE: 16 BRPM | DIASTOLIC BLOOD PRESSURE: 70 MMHG | TEMPERATURE: 98.6 F | BODY MASS INDEX: 25.47 KG/M2 | WEIGHT: 137 LBS | HEART RATE: 68 BPM

## 2017-10-17 DIAGNOSIS — J30.2 CHRONIC SEASONAL ALLERGIC RHINITIS, UNSPECIFIED TRIGGER: ICD-10-CM

## 2017-10-17 DIAGNOSIS — B35.1 TINEA UNGUIUM: ICD-10-CM

## 2017-10-17 DIAGNOSIS — Z23 NEED FOR PROPHYLACTIC VACCINATION AND INOCULATION AGAINST INFLUENZA: ICD-10-CM

## 2017-10-17 DIAGNOSIS — F90.2 ATTENTION DEFICIT HYPERACTIVITY DISORDER (ADHD), COMBINED TYPE: Primary | ICD-10-CM

## 2017-10-17 PROCEDURE — 90471 IMMUNIZATION ADMIN: CPT | Performed by: NURSE PRACTITIONER

## 2017-10-17 PROCEDURE — 99214 OFFICE O/P EST MOD 30 MIN: CPT | Mod: 25 | Performed by: NURSE PRACTITIONER

## 2017-10-17 PROCEDURE — 90686 IIV4 VACC NO PRSV 0.5 ML IM: CPT | Performed by: NURSE PRACTITIONER

## 2017-10-17 RX ORDER — DEXTROAMPHETAMINE SACCHARATE, AMPHETAMINE ASPARTATE MONOHYDRATE, DEXTROAMPHETAMINE SULFATE AND AMPHETAMINE SULFATE 5; 5; 5; 5 MG/1; MG/1; MG/1; MG/1
20 CAPSULE, EXTENDED RELEASE ORAL DAILY
Qty: 30 CAPSULE | Refills: 0 | Status: SHIPPED | OUTPATIENT
Start: 2017-12-01 | End: 2017-12-31

## 2017-10-17 RX ORDER — DEXTROAMPHETAMINE SACCHARATE, AMPHETAMINE ASPARTATE MONOHYDRATE, DEXTROAMPHETAMINE SULFATE AND AMPHETAMINE SULFATE 5; 5; 5; 5 MG/1; MG/1; MG/1; MG/1
20 CAPSULE, EXTENDED RELEASE ORAL DAILY
Qty: 30 CAPSULE | Refills: 0 | Status: SHIPPED | OUTPATIENT
Start: 2017-10-31 | End: 2017-11-30

## 2017-10-17 RX ORDER — PRENATAL VIT 91/IRON/FOLIC/DHA 28-975-200
COMBINATION PACKAGE (EA) ORAL 2 TIMES DAILY
Qty: 15 G | Refills: 1 | Status: SHIPPED | OUTPATIENT
Start: 2017-10-17 | End: 2017-10-31

## 2017-10-17 RX ORDER — LORATADINE 10 MG/1
10 TABLET ORAL DAILY
Qty: 30 TABLET | Refills: 1 | Status: SHIPPED | OUTPATIENT
Start: 2017-10-17 | End: 2020-09-25

## 2017-10-17 RX ORDER — DEXTROAMPHETAMINE SACCHARATE, AMPHETAMINE ASPARTATE MONOHYDRATE, DEXTROAMPHETAMINE SULFATE AND AMPHETAMINE SULFATE 5; 5; 5; 5 MG/1; MG/1; MG/1; MG/1
20 CAPSULE, EXTENDED RELEASE ORAL DAILY
Qty: 30 CAPSULE | Refills: 0 | Status: SHIPPED | OUTPATIENT
Start: 2017-12-31 | End: 2018-01-30

## 2017-10-17 NOTE — MR AVS SNAPSHOT
After Visit Summary   10/17/2017    Donald Duarte    MRN: [id number]           Patient Information     Date Of Birth          1995        Visit Information        Provider Department      10/17/2017 2:40 PM Nellie Wang APRN CNP [facility]        Today's Diagnoses     Fungal toenail infection    -  1    Screening for malignant neoplasm of cervix        Need for prophylactic vaccination and inoculation against influenza        Attention deficit hyperactivity disorder (ADHD), combined type        Chronic seasonal allergic rhinitis, unspecified trigger          Care Instructions    Please start topical fungal medicine for toe nail pain. If symptoms do not improve or worsen may need to try oral.     Refills given with decreased rx for adderall.    Return to clinic for PAP when able.     Thank you  Nellie Wang CNP            Follow-ups after your visit        Who to contact     If you have questions or need follow up information about today's clinic visit or your schedule please contact [facility] directly at [phone].  Normal or non-critical lab and imaging results will be communicated to you by [employer], letter or phone within 4 business days after the clinic has received the results. If you do not hear from us within 7 days, please contact the clinic through [employer] or phone. If you have a critical or abnormal lab result, we will notify you by phone as soon as possible.  Submit refill requests through [employer] or call your pharmacy and they will forward the refill request to us. Please allow 3 business days for your refill to be completed.          Additional Information About Your Visit        [employer] Information     [employer] gives you secure access to your electronic health record. If you see a primary care provider, you can also send messages to your care team and make appointments. If you have questions, please call your primary care clinic.   If you do not have a primary care provider, please call [phone] and they will assist you.        Care EveryWhere ID     This is your Care EveryWhere ID. This could be used by other organizations to access your [place] medical records  [id number]        Your Vitals Were     Pulse Temperature Respirations BMI (Body Mass Index)          68 98.6  F (37  C) (Oral) 16 25.47 kg/m2         Blood Pressure from Last 3 Encounters:   10/17/17 118/70   07/17/17 106/69   06/26/17 124/62    Weight from Last 3 Encounters:   10/17/17 137 lb (62.1 kg)   07/17/17 143 lb (64.9 kg)   06/26/17 141 lb 6.4 oz (64.1 kg)              We Performed the Following     FLU VAC, SPLIT VIRUS IM > 3 YO (QUADRIVALENT) [42713]     Vaccine Administration, Initial [99557]          Today's Medication Changes          These changes are accurate as of: 10/17/17  3:39 PM.  If you have any questions, ask your nurse or doctor.               Start taking these medicines.        Dose/Directions    loratadine 10 MG tablet   Commonly known as:  CLARITIN   Used for:  Chronic seasonal allergic rhinitis, unspecified trigger   Started by:  Nellie Wang APRN CNP        Dose:  10 mg   Take 1 tablet (10 mg) by mouth daily   Quantity:  30 tablet   Refills:  1       Phenylephrine-Polyvinyl Alc 0.12-1.4 % Soln   Commonly known as:  REFRESH REDNESS RELIEF   Used for:  Chronic seasonal allergic rhinitis, unspecified trigger   Started by:  Nellie Wang APRN CNP        Dose:  1 drop   Apply 1 drop to eye 2 times daily as needed   Quantity:  1 Bottle   Refills:  0       terbinafine 1 % cream   Commonly known as:  KP TERBINAFINE HYDROCHLORIDE   Used for:  Fungal toenail infection   Started by:  Nellie Wang APRN CNP        Apply topically 2 times daily for 14 days   Quantity:  15 g   Refills:  1         These medicines have changed or have updated prescriptions.        Dose/Directions    * amphetamine-dextroamphetamine 20 MG per  24 hr capsule   Commonly known as:  ADDERALL XR   This may have changed:    - Another medication with the same name was added. Make sure you understand how and when to take each.  - Another medication with the same name was removed. Continue taking this medication, and follow the directions you see here.   Used for:  Attention deficit hyperactivity disorder (ADHD), combined type   Changed by:  Nellie Wang APRN CNP        Dose:  20 mg   Take 1 capsule (20 mg) by mouth daily   Quantity:  30 capsule   Refills:  0       * amphetamine-dextroamphetamine 20 MG per 24 hr capsule   Commonly known as:  ADDERALL XR   This may have changed:  You were already taking a medication with the same name, and this prescription was added. Make sure you understand how and when to take each.   Used for:  Attention deficit hyperactivity disorder (ADHD), combined type   Replaces:  amphetamine-dextroamphetamine 10 MG per tablet   Changed by:  Nellie Wang APRN CNP        Dose:  20 mg   Start taking on:  10/31/2017   Take 1 capsule (20 mg) by mouth daily   Quantity:  30 capsule   Refills:  0       * amphetamine-dextroamphetamine 20 MG per 24 hr capsule   Commonly known as:  ADDERALL XR   This may have changed:  You were already taking a medication with the same name, and this prescription was added. Make sure you understand how and when to take each.   Used for:  Attention deficit hyperactivity disorder (ADHD), combined type   Changed by:  Nellie Wang APRN CNP        Dose:  20 mg   Start taking on:  12/1/2017   Take 1 capsule (20 mg) by mouth daily   Quantity:  30 capsule   Refills:  0       * amphetamine-dextroamphetamine 20 MG per 24 hr capsule   Commonly known as:  ADDERALL XR   This may have changed:  You were already taking a medication with the same name, and this prescription was added. Make sure you understand how and when to take each.   Used for:  Attention deficit hyperactivity disorder  (ADHD), combined type   Changed by:  Nellie Wang APRN CNP        Dose:  20 mg   Start taking on:  12/31/2017   Take 1 capsule (20 mg) by mouth daily   Quantity:  30 capsule   Refills:  0       * Notice:  This list has 4 medication(s) that are the same as other medications prescribed for you. Read the directions carefully, and ask your doctor or other care provider to review them with you.      Stop taking these medicines if you haven't already. Please contact your care team if you have questions.     amphetamine-dextroamphetamine 10 MG per tablet   Commonly known as:  ADDERALL   Replaced by:  amphetamine-dextroamphetamine 20 MG per 24 hr capsule   You also have another medication with the same name that you need to continue taking as instructed.   Stopped by:  Nellie Wang APRN CNP                Where to get your medicines      These medications were sent to [facility] [address] [place] [address]     Phone:  [phone]     loratadine 10 MG tablet    Phenylephrine-Polyvinyl Alc 0.12-1.4 % Soln    terbinafine 1 % cream         Some of these will need a paper prescription and others can be bought over the counter.  Ask your nurse if you have questions.     Bring a paper prescription for each of these medications     amphetamine-dextroamphetamine 20 MG per 24 hr capsule    amphetamine-dextroamphetamine 20 MG per 24 hr capsule    amphetamine-dextroamphetamine 20 MG per 24 hr capsule                Primary Care Provider Office Phone # Fax #    Latoya Elena [phone]       [address] [place] [address]  [facility] [id number]        Equal Access to Services     [facility]: Hadii artis mariscal Sotata, waaxda luqadaha, qaybta kaalmada darren, delio braswell . [facility] [phone].    ATENCIÓN: Si habla español, tiene a rosario disposición servicios gratuitos de asistencia lingüística. Llame al  [phone].    We comply with applicable federal civil rights laws and [place] laws. We do not discriminate on the basis of race, color, national origin, age, disability, sex, sexual orientation, or gender identity.            Thank you!     Thank you for choosing [facility]  for your care. Our goal is always to provide you with excellent care. Hearing back from our patients is one way we can continue to improve our services. Please take a few minutes to complete the written survey that you may receive in the mail after your visit with us. Thank you!             Your Updated Medication List - Protect others around you: Learn how to safely use, store and throw away your medicines at www.disposemymeds.org.          This list is accurate as of: 10/17/17  3:39 PM.  Always use your most recent med list.                   Brand Name Dispense Instructions for use Diagnosis    * amphetamine-dextroamphetamine 20 MG per 24 hr capsule    ADDERALL XR    30 capsule    Take 1 capsule (20 mg) by mouth daily    Attention deficit hyperactivity disorder (ADHD), combined type       * amphetamine-dextroamphetamine 20 MG per 24 hr capsule   Start taking on:  10/31/2017    ADDERALL XR    30 capsule    Take 1 capsule (20 mg) by mouth daily    Attention deficit hyperactivity disorder (ADHD), combined type       * amphetamine-dextroamphetamine 20 MG per 24 hr capsule   Start taking on:  12/1/2017    ADDERALL XR    30 capsule    Take 1 capsule (20 mg) by mouth daily    Attention deficit hyperactivity disorder (ADHD), combined type       * amphetamine-dextroamphetamine 20 MG per 24 hr capsule   Start taking on:  12/31/2017    ADDERALL XR    30 capsule    Take 1 capsule (20 mg) by mouth daily    Attention deficit hyperactivity disorder (ADHD), combined type       levonorgestrel 20 MCG/24HR IUD    MIRENA     1 each (20 mcg) by Intrauterine route continuous    Encounter for IUD insertion, Presence of intrauterine  contraceptive device       loratadine 10 MG tablet    CLARITIN    30 tablet    Take 1 tablet (10 mg) by mouth daily    Chronic seasonal allergic rhinitis, unspecified trigger       Phenylephrine-Polyvinyl Alc 0.12-1.4 % Soln    REFRESH REDNESS RELIEF    1 Bottle    Apply 1 drop to eye 2 times daily as needed    Chronic seasonal allergic rhinitis, unspecified trigger       terbinafine 1 % cream    KP TERBINAFINE HYDROCHLORIDE    15 g    Apply topically 2 times daily for 14 days    Fungal toenail infection       * Notice:  This list has 4 medication(s) that are the same as other medications prescribed for you. Read the directions carefully, and ask your doctor or other care provider to review them with you.

## 2017-10-17 NOTE — PROGRESS NOTES
Injectable Influenza Immunization Documentation    1.  Is the person to be vaccinated sick today?   No    2. Does the person to be vaccinated have an allergy to a component   of the vaccine?   No    3. Has the person to be vaccinated ever had a serious reaction   to influenza vaccine in the past?   No    4. Has the person to be vaccinated ever had Guillain-Barré syndrome?   No    Form completed by Flakita Gibson     Prior to injection verified patient identity using patient's name and date of birth.

## 2017-10-17 NOTE — PATIENT INSTRUCTIONS
Please start topical fungal medicine for toe nail pain. If symptoms do not improve or worsen may need to try oral.     Refills given with decreased rx for adderall.    Return to clinic for PAP when able.     Thank you  Nellie Wang CNP

## 2017-11-17 NOTE — PROGRESS NOTES
SUBJECTIVE:   CC: Donald Duarte is an 22 year old woman who presents for preventive health visit.     Healthy Habits:    Do you get at least three servings of calcium containing foods daily (dairy, green leafy vegetables, etc.)? no    Amount of exercise or daily activities, outside of work: 5 day(s) per week    Problems taking medications regularly No    Medication side effects: No    Have you had an eye exam in the past two years? no    Do you see a dentist twice per year? no    Do you have sleep apnea, excessive snoring or daytime drowsiness?no        Check for yeast infection  Has been treating with diflucan for yeast on and off.     Today's PHQ-2 Score:   PHQ-2 ( 1999 Pfizer) 11/21/2017 12/2/2016   Q1: Little interest or pleasure in doing things 0 1   Q2: Feeling down, depressed or hopeless 0 0   PHQ-2 Score 0 1         Abuse: Current or Past(Physical, Sexual or Emotional)- No  Do you feel safe in your environment - Yes  Social History   Substance Use Topics     Smoking status: Never Smoker     Smokeless tobacco: Never Used     Alcohol use No     The patient does not drink >3 drinks per day nor >7 drinks per week.    Reviewed orders with patient.  Reviewed health maintenance and updated orders accordingly - Yes  BP Readings from Last 3 Encounters:   11/21/17 110/68   10/17/17 118/70   07/17/17 106/69    Wt Readings from Last 3 Encounters:   11/21/17 138 lb 8 oz (62.8 kg)   10/17/17 137 lb (62.1 kg)   07/17/17 143 lb (64.9 kg)                  Patient Active Problem List   Diagnosis     DENISE (generalized anxiety disorder)     Steppage gait     Pes valgus, unspecified laterality     Anxiety     Flu vaccine need     Attention deficit hyperactivity disorder (ADHD), combined type     Need for hepatitis B screening test     Presence of intrauterine contraceptive device     Past Surgical History:   Procedure Laterality Date     DENTAL SURGERY      wisdom teeth       Social History   Substance Use Topics      Smoking status: Never Smoker     Smokeless tobacco: Never Used     Alcohol use No     Family History   Problem Relation Age of Onset     Hypertension Mother      Arthritis Paternal Grandmother      lupus     Breast Cancer Paternal Aunt          Current Outpatient Prescriptions   Medication Sig Dispense Refill     amphetamine-dextroamphetamine (ADDERALL XR) 20 MG per 24 hr capsule Take 1 capsule (20 mg) by mouth daily 30 capsule 0     loratadine (CLARITIN) 10 MG tablet Take 1 tablet (10 mg) by mouth daily 30 tablet 1     levonorgestrel (MIRENA) 20 MCG/24HR IUD 1 each (20 mcg) by Intrauterine route continuous       [START ON 12/1/2017] amphetamine-dextroamphetamine (ADDERALL XR) 20 MG per 24 hr capsule Take 1 capsule (20 mg) by mouth daily (Patient not taking: Reported on 11/21/2017) 30 capsule 0     [START ON 12/31/2017] amphetamine-dextroamphetamine (ADDERALL XR) 20 MG per 24 hr capsule Take 1 capsule (20 mg) by mouth daily (Patient not taking: Reported on 11/21/2017) 30 capsule 0     Phenylephrine-Polyvinyl Alc (REFRESH REDNESS RELIEF) 0.12-1.4 % SOLN Apply 1 drop to eye 2 times daily as needed (Patient not taking: Reported on 11/21/2017) 1 Bottle 0           Mammogram not appropriate for this patient based on age.    Pertinent mammograms are reviewed under the imaging tab.  History of abnormal Pap smear: NO - age 21-29 PAP every 3 years recommended    Reviewed and updated as needed this visit by clinical staff         Reviewed and updated as needed this visit by Provider            ROS:  C: NEGATIVE for fever, chills, change in weight  I: NEGATIVE for worrisome rashes, moles or lesions  E: NEGATIVE for vision changes or irritation  ENT: NEGATIVE for ear, mouth and throat problems  R: NEGATIVE for significant cough or SOB  B: NEGATIVE for masses, tenderness or discharge  CV: NEGATIVE for chest pain, palpitations or peripheral edema  GI: NEGATIVE for nausea, abdominal pain, heartburn, or change in bowel habits  :  NEGATIVE for unusual urinary or vaginal symptoms. Periods are regular.  M: NEGATIVE for significant arthralgias or myalgia  N: NEGATIVE for weakness, dizziness or paresthesias  E: NEGATIVE for temperature intolerance, skin/hair changes  H: NEGATIVE for bleeding problems  P: NEGATIVE for changes in mood or affect    OBJECTIVE:   There were no vitals taken for this visit.  EXAM:  GENERAL: healthy, alert and no distress  EYES: Eyes grossly normal to inspection, PERRL and conjunctivae and sclerae normal  HENT: ear canals and TM's normal, nose and mouth without ulcers or lesions  NECK: no adenopathy, no asymmetry, masses, or scars and thyroid normal to palpation  RESP: lungs clear to auscultation - no rales, rhonchi or wheezes  BREAST: normal without masses, tenderness or nipple discharge and no palpable axillary masses or adenopathy  CV: regular rate and rhythm, normal S1 S2, no S3 or S4, no murmur, click or rub, no peripheral edema and peripheral pulses strong  ABDOMEN: soft, nontender, no hepatosplenomegaly, no masses and bowel sounds normal   (female): normal female external genitalia, normal urethral meatus, vaginal mucosa pink, moist, well rugated, and normal cervix without masses or discharge.  IUD strings noted at 3 cm from os  MS: no gross musculoskeletal defects noted, no edema  SKIN: no suspicious lesions or rashes  NEURO: Normal strength and tone, mentation intact and speech normal  PSYCH: mentation appears normal, affect normal/bright    ASSESSMENT/PLAN:   1. Encounter for well woman exam with routine gynecological exam    - Wet prep    2. Screen for STD (sexually transmitted disease)    - Chlamydia trachomatis PCR  - Neisseria gonorrhoeae PCR  - Wet prep    3. Vaginal itching    - Wet prep    4. Screening for malignant neoplasm of cervix    - Wet prep  - Pap imaged thin layer screen only - recommended age 21 - 24 years    COUNSELING:   Reviewed preventive health counseling, as reflected in patient  "instructions       Regular exercise       Healthy diet/nutrition       Contraception       Family planning         reports that she has never smoked. She has never used smokeless tobacco.    Estimated body mass index is 25.47 kg/(m^2) as calculated from the following:    Height as of 6/26/17: 5' 1.5\" (1.562 m).    Weight as of 10/17/17: 137 lb (62.1 kg).     Called and discussed wet prep that shows BV.    Agrees to metrogel.  Would plan for her to return with repeat infections to determine if it is yeast or BV.      Counseling Resources:  ATP IV Guidelines  Pooled Cohorts Equation Calculator  Breast Cancer Risk Calculator  FRAX Risk Assessment  ICSI Preventive Guidelines  Dietary Guidelines for Americans, 2010  USDA's MyPlate  ASA Prophylaxis  Lung CA Screening    Day JAMES Quiroga CNM  [facility]  "

## 2017-11-17 NOTE — PATIENT INSTRUCTIONS
Preventive Health Recommendations  Female Ages 18 to 25     Yearly exam:     See your health care provider every year in order to  o Review health changes.   o Discuss preventive care.    o Review your medicines if your doctor has prescribed any.      You should be tested each year for STDs (sexually transmitted diseases).       After age 20, talk to your provider about how often you should have cholesterol testing.      Starting at age 21, get a Pap test every three years. If you have an abnormal result, your doctor may have you test more often.      If you are at risk for diabetes, you should have a diabetes test (fasting glucose).     Shots:     Get a flu shot each year.     Get a tetanus shot every 10 years.     Consider getting the shot (vaccine) that prevents cervical cancer (Gardasil).    Nutrition:     Eat at least 5 servings of fruits and vegetables each day.    Eat whole-grain bread, whole-wheat pasta and brown rice instead of white grains and rice.    Talk to your provider about Calcium and Vitamin D.     Lifestyle    Exercise at least 150 minutes a week each week (30 minutes a day, 5 days a week). This will help you control your weight and prevent disease.    Limit alcohol to one drink per day.    No smoking.     Wear sunscreen to prevent skin cancer.    See your dentist every six months for an exam and cleaning.    Here is a list of suggestions that may help eliminate or prevent vaginal infections or reduce their recurrence.  Many of these suggestions:   1. boosting your immune system  2. changing the vaginal environment to a more acidic state   3. increasing the good healthy bacteria    Read through them and try the ones that seem to fit you and your life style.    Soak in a warm bath tub, no soap, no bubble bath and no oils.  Add one cup vinegar or lemon juice to bath water once in a while,     Keep a water bottle with a squirt top in your bathroom, fill with warm water and use as a spray after  wiping, then pat dry.  You may add 1-3 TBS of white vinegar to help maintain an acidic environment.    Wear cotton underwear; loose pants or skirts, avoid pantyhose and thong underwear.    Try to avoid wearing underware to bed so that your vaginal area can breathe and stay drier.    Keep vaginal area as dry as possible so don't sit for long periods of time in workout clothing or wet bathing suits.     Consider using either male or female condoms or asking your partner not to ejaculate in your vagina.    To boost your immune system consider the followin. Sleep:7-8 hours a night  2. Fluids: get a minimum of 8-10 glasses of water a day  3. Foods: try reducing processed foods in your diet and add whole grains, raw vegetables, fruit, and yogurt that contains active culture or kefir  4. Consider taking a vitamin B complex tablet, sometimes called stress tabs, Vitamin D 1000mg a day, or cranberry tablets.    5.  Consider the stress in your life and try to reduce it through yoga or meditation.    Decrease daily intake of refined sugars, honey, red meat and alcohol    At each meal drink 1tsp apple cider vinegar and 1 tsp honey in   cup warm water    Consider probiotic tablets to restore normal bacteria back into your system    Boric acid capsules, insert 2 capsules  00  daily into vagina every 3 days if you have chronic problems with yeast or bacterial vaginosis.    If your symptoms do not resolve or if you have questions please call the clinic at [phone] for Fuentes Elaine or [phone] for Sarah or send a message through My Chart.

## 2017-11-21 ENCOUNTER — OFFICE VISIT (OUTPATIENT)
Dept: OBGYN | Facility: OTHER | Age: 22
End: 2017-11-21
Payer: COMMERCIAL

## 2017-11-21 VITALS
WEIGHT: 138.5 LBS | DIASTOLIC BLOOD PRESSURE: 68 MMHG | SYSTOLIC BLOOD PRESSURE: 110 MMHG | HEIGHT: 61 IN | HEART RATE: 72 BPM | BODY MASS INDEX: 26.15 KG/M2

## 2017-11-21 DIAGNOSIS — N76.0 BV (BACTERIAL VAGINOSIS): ICD-10-CM

## 2017-11-21 DIAGNOSIS — N89.8 VAGINAL ITCHING: ICD-10-CM

## 2017-11-21 DIAGNOSIS — Z01.419 ENCOUNTER FOR WELL WOMAN EXAM WITH ROUTINE GYNECOLOGICAL EXAM: Primary | ICD-10-CM

## 2017-11-21 DIAGNOSIS — B96.89 BV (BACTERIAL VAGINOSIS): ICD-10-CM

## 2017-11-21 DIAGNOSIS — Z11.3 SCREEN FOR STD (SEXUALLY TRANSMITTED DISEASE): ICD-10-CM

## 2017-11-21 DIAGNOSIS — Z12.4 SCREENING FOR MALIGNANT NEOPLASM OF CERVIX: ICD-10-CM

## 2017-11-21 LAB
SPECIMEN SOURCE: ABNORMAL
WET PREP SPEC: ABNORMAL

## 2017-11-21 PROCEDURE — 87491 CHLMYD TRACH DNA AMP PROBE: CPT | Performed by: ADVANCED PRACTICE MIDWIFE

## 2017-11-21 PROCEDURE — 87591 N.GONORRHOEAE DNA AMP PROB: CPT | Performed by: ADVANCED PRACTICE MIDWIFE

## 2017-11-21 PROCEDURE — 99395 PREV VISIT EST AGE 18-39: CPT | Performed by: ADVANCED PRACTICE MIDWIFE

## 2017-11-21 PROCEDURE — G0145 SCR C/V CYTO,THINLAYER,RESCR: HCPCS | Performed by: ADVANCED PRACTICE MIDWIFE

## 2017-11-21 PROCEDURE — 87210 SMEAR WET MOUNT SALINE/INK: CPT | Performed by: ADVANCED PRACTICE MIDWIFE

## 2017-11-21 RX ORDER — METRONIDAZOLE 7.5 MG/G
1 GEL VAGINAL AT BEDTIME
Qty: 70 G | Refills: 0 | Status: SHIPPED | OUTPATIENT
Start: 2017-11-21 | End: 2017-11-26

## 2017-11-21 NOTE — MR AVS SNAPSHOT
After Visit Summary   11/21/2017    Donald Duarte    MRN: [id number]           Patient Information     Date Of Birth          1995        Visit Information        Provider Department      11/21/2017 3:15 PM [facility]        Today's Diagnoses     Encounter for well woman exam with routine gynecological exam    -  1    Screen for STD (sexually transmitted disease)        Vaginal itching        Screening for malignant neoplasm of cervix          Care Instructions      Preventive Health Recommendations  Female Ages 18 to 25     Yearly exam:     See your health care provider every year in order to  o Review health changes.   o Discuss preventive care.    o Review your medicines if your doctor has prescribed any.      You should be tested each year for STDs (sexually transmitted diseases).       After age 20, talk to your provider about how often you should have cholesterol testing.      Starting at age 21, get a Pap test every three years. If you have an abnormal result, your doctor may have you test more often.      If you are at risk for diabetes, you should have a diabetes test (fasting glucose).     Shots:     Get a flu shot each year.     Get a tetanus shot every 10 years.     Consider getting the shot (vaccine) that prevents cervical cancer (Gardasil).    Nutrition:     Eat at least 5 servings of fruits and vegetables each day.    Eat whole-grain bread, whole-wheat pasta and brown rice instead of white grains and rice.    Talk to your provider about Calcium and Vitamin D.     Lifestyle    Exercise at least 150 minutes a week each week (30 minutes a day, 5 days a week). This will help you control your weight and prevent disease.    Limit alcohol to one drink per day.    No smoking.     Wear sunscreen to prevent skin cancer.    See your dentist every six months for an exam and cleaning.    Here is a list of suggestions that may help eliminate or  prevent vaginal infections or reduce their recurrence.  Many of these suggestions:   1. boosting your immune system  2. changing the vaginal environment to a more acidic state   3. increasing the good healthy bacteria    Read through them and try the ones that seem to fit you and your life style.    Soak in a warm bath tub, no soap, no bubble bath and no oils.  Add one cup vinegar or lemon juice to bath water once in a while,     Keep a water bottle with a squirt top in your bathroom, fill with warm water and use as a spray after wiping, then pat dry.  You may add 1-3 TBS of white vinegar to help maintain an acidic environment.    Wear cotton underwear; loose pants or skirts, avoid pantyhose and thong underwear.    Try to avoid wearing underware to bed so that your vaginal area can breathe and stay drier.    Keep vaginal area as dry as possible so don't sit for long periods of time in workout clothing or wet bathing suits.     Consider using either male or female condoms or asking your partner not to ejaculate in your vagina.    To boost your immune system consider the followin. Sleep:7-8 hours a night  2. Fluids: get a minimum of 8-10 glasses of water a day  3. Foods: try reducing processed foods in your diet and add whole grains, raw vegetables, fruit, and yogurt that contains active culture or kefir  4. Consider taking a vitamin B complex tablet, sometimes called stress tabs, Vitamin D 1000mg a day, or cranberry tablets.    5.  Consider the stress in your life and try to reduce it through yoga or meditation.    Decrease daily intake of refined sugars, honey, red meat and alcohol    At each meal drink 1tsp apple cider vinegar and 1 tsp honey in   cup warm water    Consider probiotic tablets to restore normal bacteria back into your system    Boric acid capsules, insert 2 capsules  00  daily into vagina every 3 days if you have chronic problems with yeast or bacterial vaginosis.    If your symptoms do not  "resolve or if you have questions please call the clinic at [phone] for Fuentes Elaine or [phone] for Sarah or send a message through My Chart.             Follow-ups after your visit        Who to contact     If you have questions or need follow up information about today's clinic visit or your schedule please contact [facility] directly at [phone].  Normal or non-critical lab and imaging results will be communicated to you by LeTVhart, letter or phone within 4 business days after the clinic has received the results. If you do not hear from us within 7 days, please contact the clinic through Dovetailt or phone. If you have a critical or abnormal lab result, we will notify you by phone as soon as possible.  Submit refill requests through QRGL or call your pharmacy and they will forward the refill request to us. Please allow 3 business days for your refill to be completed.          Additional Information About Your Visit        LeTVharShift Network Information     QRGL gives you secure access to your electronic health record. If you see a primary care provider, you can also send messages to your care team and make appointments. If you have questions, please call your primary care clinic.  If you do not have a primary care provider, please call [phone] and they will assist you.        Care EveryWhere ID     This is your Care EveryWhere ID. This could be used by other organizations to access your [place] medical records  [id number]        Your Vitals Were     Pulse Height BMI (Body Mass Index)             72 5' 1.25\" (1.556 m) 25.96 kg/m2          Blood Pressure from Last 3 Encounters:   11/21/17 110/68   10/17/17 118/70   07/17/17 106/69    Weight from Last 3 Encounters:   11/21/17 138 lb 8 oz (62.8 kg)   10/17/17 137 lb (62.1 kg)   07/17/17 143 lb (64.9 kg)              We Performed the Following     Chlamydia trachomatis PCR     Neisseria gonorrhoeae PCR     Pap imaged thin layer screen " only - recommended age 21 - 24 years     Wet prep        Primary Care Provider Office Phone # Fax #    Latoya Elena [phone]       [address] [place] [address]        Equal Access to Services     BRANDAN BARNHART : Hadii artis almaguer hadenocho Soomaali, waaxda luqadaha, qaybta kaalmada adeegyada, waxcarlota mary maulikkody card merrillmae isbell. So [facility] [phone].    ATENCIÓN: Si habla español, tiene a rosario disposición servicios gratuitos de asistencia lingüística. Llame al [phone].    We comply with applicable federal civil rights laws and [place] laws. We do not discriminate on the basis of race, color, national origin, age, disability, sex, sexual orientation, or gender identity.            Thank you!     Thank you for choosing [facility]  for your care. Our goal is always to provide you with excellent care. Hearing back from our patients is one way we can continue to improve our services. Please take a few minutes to complete the written survey that you may receive in the mail after your visit with us. Thank you!             Your Updated Medication List - Protect others around you: Learn how to safely use, store and throw away your medicines at www.disposemymeds.org.          This list is accurate as of: 11/21/17  3:51 PM.  Always use your most recent med list.                   Brand Name Dispense Instructions for use Diagnosis    * amphetamine-dextroamphetamine 20 MG per 24 hr capsule    ADDERALL XR    30 capsule    Take 1 capsule (20 mg) by mouth daily    Attention deficit hyperactivity disorder (ADHD), combined type       * amphetamine-dextroamphetamine 20 MG per 24 hr capsule   Start taking on:  12/1/2017    ADDERALL XR    30 capsule    Take 1 capsule (20 mg) by mouth daily    Attention deficit hyperactivity disorder (ADHD), combined type       * amphetamine-dextroamphetamine 20 MG per 24 hr capsule   Start taking on:  12/31/2017    ADDERALL XR    30 capsule    Take  1 capsule (20 mg) by mouth daily    Attention deficit hyperactivity disorder (ADHD), combined type       levonorgestrel 20 MCG/24HR IUD    MIRENA     1 each (20 mcg) by Intrauterine route continuous    Encounter for IUD insertion, Presence of intrauterine contraceptive device       loratadine 10 MG tablet    CLARITIN    30 tablet    Take 1 tablet (10 mg) by mouth daily    Chronic seasonal allergic rhinitis, unspecified trigger       Phenylephrine-Polyvinyl Alc 0.12-1.4 % Soln    REFRESH REDNESS RELIEF    1 Bottle    Apply 1 drop to eye 2 times daily as needed    Chronic seasonal allergic rhinitis, unspecified trigger       * Notice:  This list has 3 medication(s) that are the same as other medications prescribed for you. Read the directions carefully, and ask your doctor or other care provider to review them with you.

## 2017-11-21 NOTE — LETTER
December 1, 2017      Donald Duarte  [address] [facility] [address]    Dear ,      I am happy to inform you that your recent cervical cancer screening test (PAP smear) was normal.      Preventative screenings such as this help to ensure your health for years to come. You should repeat a pap smear in 3 years, unless otherwise directed.      You will still need to return to the clinic every year for your annual exam and other preventive tests.     Please contact the clinic at [phone] if you have further questions.       Sincerely,      JAMES Mendoza CNM/omega

## 2017-11-21 NOTE — NURSING NOTE
"Chief Complaint   Patient presents with     Physical       Initial /68 (BP Location: Right arm, Patient Position: Chair, Cuff Size: Adult Regular)  Pulse 72  Ht 5' 1.25\" (1.556 m)  Wt 138 lb 8 oz (62.8 kg)  BMI 25.96 kg/m2 Estimated body mass index is 25.96 kg/(m^2) as calculated from the following:    Height as of this encounter: 5' 1.25\" (1.556 m).    Weight as of this encounter: 138 lb 8 oz (62.8 kg).  Medication Reconciliation: carola Ayala CMA      "

## 2017-11-22 LAB
C TRACH DNA SPEC QL NAA+PROBE: NEGATIVE
N GONORRHOEA DNA SPEC QL NAA+PROBE: NEGATIVE
SPECIMEN SOURCE: NORMAL
SPECIMEN SOURCE: NORMAL

## 2017-11-24 LAB
COPATH REPORT: NORMAL
PAP: NORMAL

## 2018-01-29 NOTE — PROGRESS NOTES
SUBJECTIVE:   Donald Duarte is a 22 year old female who presents to clinic today for the following health issues:      HPI     SUBJECTIVE:  Patient is here today to recheck ADHD/ADD.    Updates since last visit: none   Routine for taking medicine, including time: 7 am   Time medicine wears off: 5pm   Issues at school/Work: none   Issues at home: none   Control of symptoms: well controlled     Side effects:  Headaches: No  Stomach aches: No  Irritability/mood swings: No  Difficulties with sleep: No  Social withdrawal: No  Unusual movements/tics: No  Decreased appetite: No    Other concerns: to discuss frequent increased heart rate.  She states that when she is at the gym often times she will be working out and using a heart rate monitor and will notice her heart rate will increase into the 130s-190s.  She states that when she is at work she will have some shortness of breath after walking up a flight of stairs or when she is sitting at her desk she will notice that her heart will start to race and when she checks her pulse is usually in the 130s.  She was worked up when she was younger for tachycardia and fainting spells.  She states that she continues to have fainting spells occasionally denies any recent episodes.  She denies current symptoms of chest pain, shortness of breath, arm pain numbness or tingling or any other related symptoms.    Negative history of family cardiac disorders.  Sister has hypothyroidism      Problem list and histories reviewed & adjusted, as indicated.  Additional history: as documented    Patient Active Problem List   Diagnosis     DENISE (generalized anxiety disorder)     Steppage gait     Pes valgus, unspecified laterality     Anxiety     Flu vaccine need     Attention deficit hyperactivity disorder (ADHD), combined type     Need for hepatitis B screening test     Presence of intrauterine contraceptive device     Tachycardia     Past Surgical History:   Procedure Laterality Date      "DENTAL SURGERY      wisdom teeth       Social History   Substance Use Topics     Smoking status: Never Smoker     Smokeless tobacco: Never Used     Alcohol use No     Family History   Problem Relation Age of Onset     Hypertension Mother      Arthritis Paternal Grandmother      lupus     Breast Cancer Paternal Aunt          Current Outpatient Prescriptions   Medication Sig Dispense Refill     Amphetamine-Dextroamphetamine (ADDERALL XR PO) Take 20 mg by mouth       [START ON 2/5/2018] amphetamine-dextroamphetamine (ADDERALL XR) 20 MG per 24 hr capsule Take 1 capsule (20 mg) by mouth daily 30 capsule 0     [START ON 3/7/2018] amphetamine-dextroamphetamine (ADDERALL XR) 20 MG per 24 hr capsule Take 1 capsule (20 mg) by mouth daily 30 capsule 0     [START ON 4/6/2018] amphetamine-dextroamphetamine (ADDERALL XR) 20 MG per 24 hr capsule Take 1 capsule (20 mg) by mouth daily 30 capsule 0     loratadine (CLARITIN) 10 MG tablet Take 1 tablet (10 mg) by mouth daily 30 tablet 1     levonorgestrel (MIRENA) 20 MCG/24HR IUD 1 each (20 mcg) by Intrauterine route continuous       Phenylephrine-Polyvinyl Alc (REFRESH REDNESS RELIEF) 0.12-1.4 % SOLN Apply 1 drop to eye 2 times daily as needed (Patient not taking: Reported on 11/21/2017) 1 Bottle 0     Allergies   Allergen Reactions     No Known Allergies      BP Readings from Last 3 Encounters:   01/31/18 108/68   11/21/17 110/68   10/17/17 118/70    Wt Readings from Last 3 Encounters:   01/31/18 139 lb (63 kg)   11/21/17 138 lb 8 oz (62.8 kg)   10/17/17 137 lb (62.1 kg)                  Labs reviewed in EPIC    ROS:  Constitutional, HEENT, cardiovascular, pulmonary, gi and gu systems are negative, except as otherwise noted.    OBJECTIVE:     /68 (BP Location: Left arm, Patient Position: Chair, Cuff Size: Adult Regular)  Pulse 68  Temp 99  F (37.2  C) (Oral)  Resp 16  Ht 5' 1.25\" (1.556 m)  Wt 139 lb (63 kg)  BMI 26.05 kg/m2  Body mass index is 26.05 kg/(m^2).  GENERAL: " healthy, alert and no distress  EYES: Eyes grossly normal to inspection, PERRL and conjunctivae and sclerae normal  HENT: ear canals and TM's normal, nose and mouth without ulcers or lesions  NECK: no adenopathy, no asymmetry, masses, or scars and thyroid normal to palpation  RESP: lungs clear to auscultation - no rales, rhonchi or wheezes  CV: regular rate and rhythm, normal S1 S2, no S3 or S4, no murmur, click or rub, no peripheral edema and peripheral pulses strong  ABDOMEN: soft, nontender, no hepatosplenomegaly, no masses and bowel sounds normal  MS: no gross musculoskeletal defects noted, no edema  SKIN: no suspicious lesions or rashes  SKIN: no suspicious lesions or rashes  BACK: no CVA tenderness, no paralumbar tenderness  PSYCH: mentation appears normal, affect normal/bright    ASSESSMENT/PLAN:   1. Attention deficit hyperactivity disorder (ADHD), combined type  Continue with current regimen of 20 mg daily for ADHD she states that she does not actually take her medication every day and often will skip weekends.  She may call clinic in May for an additional 3 month refill.  Next appointment for office visit should be in 6 months she will also need a urine drug screen with this visit.  - amphetamine-dextroamphetamine (ADDERALL XR) 20 MG per 24 hr capsule; Take 1 capsule (20 mg) by mouth daily  Dispense: 30 capsule; Refill: 0  - amphetamine-dextroamphetamine (ADDERALL XR) 20 MG per 24 hr capsule; Take 1 capsule (20 mg) by mouth daily  Dispense: 30 capsule; Refill: 0  - amphetamine-dextroamphetamine (ADDERALL XR) 20 MG per 24 hr capsule; Take 1 capsule (20 mg) by mouth daily  Dispense: 30 capsule; Refill: 0    2. Tachycardia  Recommend EKG today this was normal.  Will check blood work for anemias electrolyte abnormalities, will also check thyroid due to family history.  - EKG 12-lead complete w/read - Clinics  - CBC with platelets and differential  - Basic metabolic panel  - TSH  - Holter Monitor 48 hour -  Adult; Future  If any abnormality noted or cardiac abnormality noted on Holter monitor would recommend follow-up with cardiology for further clinical evaluation.      Home care instructions were reviewed with the patient. The risks, benefits and treatment options of prescribed medications or other treatments have been discussed with the patient. The patient verbalized their understanding and should call or follow up if no improvement or if they develop further problems.      Patient Instructions   I will call you with your lab results.  Please schedule appointment to have Holter monitor applied at [facility].    Will call with additional treatment plan once this is finished.    May contact clinic in May for refills for an additional 3 month supply.  Will need to return to clinic in 6 months for reevaluation and urine drug screen    Thank you  Nellie Wang CNP

## 2018-01-31 ENCOUNTER — OFFICE VISIT (OUTPATIENT)
Dept: FAMILY MEDICINE | Facility: OTHER | Age: 23
End: 2018-01-31
Payer: COMMERCIAL

## 2018-01-31 VITALS
SYSTOLIC BLOOD PRESSURE: 108 MMHG | HEART RATE: 68 BPM | WEIGHT: 139 LBS | RESPIRATION RATE: 16 BRPM | TEMPERATURE: 99 F | BODY MASS INDEX: 26.24 KG/M2 | DIASTOLIC BLOOD PRESSURE: 68 MMHG | HEIGHT: 61 IN

## 2018-01-31 DIAGNOSIS — R00.0 TACHYCARDIA: ICD-10-CM

## 2018-01-31 DIAGNOSIS — F90.2 ATTENTION DEFICIT HYPERACTIVITY DISORDER (ADHD), COMBINED TYPE: Primary | ICD-10-CM

## 2018-01-31 LAB
ANION GAP SERPL CALCULATED.3IONS-SCNC: 7 MMOL/L (ref 3–14)
BASOPHILS # BLD AUTO: 0 10E9/L (ref 0–0.2)
BASOPHILS NFR BLD AUTO: 0.3 %
BUN SERPL-MCNC: 10 MG/DL (ref 7–30)
CALCIUM SERPL-MCNC: 9.1 MG/DL (ref 8.5–10.1)
CHLORIDE SERPL-SCNC: 105 MMOL/L (ref 94–109)
CO2 SERPL-SCNC: 27 MMOL/L (ref 20–32)
CREAT SERPL-MCNC: 0.66 MG/DL (ref 0.52–1.04)
DIFFERENTIAL METHOD BLD: NORMAL
EOSINOPHIL # BLD AUTO: 0.1 10E9/L (ref 0–0.7)
EOSINOPHIL NFR BLD AUTO: 1.2 %
ERYTHROCYTE [DISTWIDTH] IN BLOOD BY AUTOMATED COUNT: 12.6 % (ref 10–15)
GFR SERPL CREATININE-BSD FRML MDRD: >90 ML/MIN/1.7M2
GLUCOSE SERPL-MCNC: 77 MG/DL (ref 70–99)
HCT VFR BLD AUTO: 38.8 % (ref 35–47)
HGB BLD-MCNC: 13.1 G/DL (ref 11.7–15.7)
LYMPHOCYTES # BLD AUTO: 2.8 10E9/L (ref 0.8–5.3)
LYMPHOCYTES NFR BLD AUTO: 42.7 %
MCH RBC QN AUTO: 31 PG (ref 26.5–33)
MCHC RBC AUTO-ENTMCNC: 33.8 G/DL (ref 31.5–36.5)
MCV RBC AUTO: 92 FL (ref 78–100)
MONOCYTES # BLD AUTO: 0.5 10E9/L (ref 0–1.3)
MONOCYTES NFR BLD AUTO: 7.7 %
NEUTROPHILS # BLD AUTO: 3.1 10E9/L (ref 1.6–8.3)
NEUTROPHILS NFR BLD AUTO: 48.1 %
PLATELET # BLD AUTO: 262 10E9/L (ref 150–450)
POTASSIUM SERPL-SCNC: 3.9 MMOL/L (ref 3.4–5.3)
RBC # BLD AUTO: 4.23 10E12/L (ref 3.8–5.2)
SODIUM SERPL-SCNC: 139 MMOL/L (ref 133–144)
TSH SERPL DL<=0.005 MIU/L-ACNC: 0.9 MU/L (ref 0.4–4)
WBC # BLD AUTO: 6.5 10E9/L (ref 4–11)

## 2018-01-31 PROCEDURE — 85025 COMPLETE CBC W/AUTO DIFF WBC: CPT | Performed by: NURSE PRACTITIONER

## 2018-01-31 PROCEDURE — 80048 BASIC METABOLIC PNL TOTAL CA: CPT | Performed by: NURSE PRACTITIONER

## 2018-01-31 PROCEDURE — 84443 ASSAY THYROID STIM HORMONE: CPT | Performed by: NURSE PRACTITIONER

## 2018-01-31 PROCEDURE — 99214 OFFICE O/P EST MOD 30 MIN: CPT | Performed by: NURSE PRACTITIONER

## 2018-01-31 PROCEDURE — 93000 ELECTROCARDIOGRAM COMPLETE: CPT | Performed by: NURSE PRACTITIONER

## 2018-01-31 PROCEDURE — 36415 COLL VENOUS BLD VENIPUNCTURE: CPT | Performed by: NURSE PRACTITIONER

## 2018-01-31 RX ORDER — DEXTROAMPHETAMINE SACCHARATE, AMPHETAMINE ASPARTATE MONOHYDRATE, DEXTROAMPHETAMINE SULFATE AND AMPHETAMINE SULFATE 5; 5; 5; 5 MG/1; MG/1; MG/1; MG/1
20 CAPSULE, EXTENDED RELEASE ORAL DAILY
Qty: 30 CAPSULE | Refills: 0 | Status: SHIPPED | OUTPATIENT
Start: 2018-03-07 | End: 2018-04-06

## 2018-01-31 RX ORDER — DEXTROAMPHETAMINE SACCHARATE, AMPHETAMINE ASPARTATE MONOHYDRATE, DEXTROAMPHETAMINE SULFATE AND AMPHETAMINE SULFATE 5; 5; 5; 5 MG/1; MG/1; MG/1; MG/1
20 CAPSULE, EXTENDED RELEASE ORAL DAILY
Qty: 30 CAPSULE | Refills: 0 | Status: SHIPPED | OUTPATIENT
Start: 2018-04-06 | End: 2018-05-06

## 2018-01-31 RX ORDER — DEXTROAMPHETAMINE SACCHARATE, AMPHETAMINE ASPARTATE MONOHYDRATE, DEXTROAMPHETAMINE SULFATE AND AMPHETAMINE SULFATE 5; 5; 5; 5 MG/1; MG/1; MG/1; MG/1
20 CAPSULE, EXTENDED RELEASE ORAL DAILY
Qty: 30 CAPSULE | Refills: 0 | Status: SHIPPED | OUTPATIENT
Start: 2018-02-05 | End: 2018-03-07

## 2018-01-31 NOTE — MR AVS SNAPSHOT
After Visit Summary   1/31/2018    Donald Duarte    MRN: [id number]           Patient Information     Date Of Birth          1995        Visit Information        Provider Department      1/31/2018 2:40 PM Nellie Wang APRN CNP [facility]        Today's Diagnoses     Tachycardia    -  1    Attention deficit hyperactivity disorder (ADHD), combined type          Care Instructions    I will call you with your lab results.  Please schedule appointment to have Holter monitor applied at [facility].    Will call with additional treatment plan once this is finished.    May contact clinic in May for refills for an additional 3 month supply.  Will need to return to clinic in 6 months for reevaluation and urine drug screen    Thank you  Nellie Wang CNP            Follow-ups after your visit        Your next 10 appointments already scheduled     Feb 02, 2018  7:30 AM CST   Holter Monitor with PH EVENT/HOLTER MONITOR   [facility] ([facility])    [address] [place] [id number] [phone]              Future tests that were ordered for you today     Open Future Orders        Priority Expected Expires Ordered    Holter Monitor 48 hour - Adult Routine  3/17/2018 1/31/2018            Who to contact     If you have questions or need follow up information about today's clinic visit or your schedule please contact [facility] directly at [phone].  Normal or non-critical lab and imaging results will be communicated to you by MyChart, letter or phone within 4 business days after the clinic has received the results. If you do not hear from us within 7 days, please contact the clinic through MyChart or phone. If you have a critical or abnormal lab result, we will notify you by phone as soon as possible.  Submit refill requests through [employer] or call your pharmacy and they will forward  "the refill request to us. Please allow 3 business days for your refill to be completed.          Additional Information About Your Visit        [employer] Information     [employer] gives you secure access to your electronic health record. If you see a primary care provider, you can also send messages to your care team and make appointments. If you have questions, please call your primary care clinic.  If you do not have a primary care provider, please call [phone] and they will assist you.        Care EveryWhere ID     This is your Care EveryWhere ID. This could be used by other organizations to access your [facility] medical records  [id number]        Your Vitals Were     Pulse Temperature Respirations Height BMI (Body Mass Index)       68 99  F (37.2  C) (Oral) 16 5' 1.25\" (1.556 m) 26.05 kg/m2        Blood Pressure from Last 3 Encounters:   01/31/18 108/68   11/21/17 110/68   10/17/17 118/70    Weight from Last 3 Encounters:   01/31/18 139 lb (63 kg)   11/21/17 138 lb 8 oz (62.8 kg)   10/17/17 137 lb (62.1 kg)              We Performed the Following     Basic metabolic panel     CBC with platelets and differential     EKG 12-lead complete w/read - Clinics     TSH          Today's Medication Changes          These changes are accurate as of 1/31/18  3:34 PM.  If you have any questions, ask your nurse or doctor.               These medicines have changed or have updated prescriptions.        Dose/Directions    * ADDERALL XR PO   This may have changed:  Another medication with the same name was added. Make sure you understand how and when to take each.   Changed by:  Nellie Wang APRN CNP        Dose:  20 mg   Take 20 mg by mouth   Refills:  0       * amphetamine-dextroamphetamine 20 MG per 24 hr capsule   Commonly known as:  ADDERALL XR   This may have changed:  You were already taking a medication with the same name, and this prescription was added. Make sure you understand how and when to take each. "   Used for:  Attention deficit hyperactivity disorder (ADHD), combined type   Changed by:  Nellie Wang APRN CNP        Dose:  20 mg   Start taking on:  2/5/2018   Take 1 capsule (20 mg) by mouth daily   Quantity:  30 capsule   Refills:  0       * amphetamine-dextroamphetamine 20 MG per 24 hr capsule   Commonly known as:  ADDERALL XR   This may have changed:  You were already taking a medication with the same name, and this prescription was added. Make sure you understand how and when to take each.   Used for:  Attention deficit hyperactivity disorder (ADHD), combined type   Changed by:  Nellie Wang APRN CNP        Dose:  20 mg   Start taking on:  3/7/2018   Take 1 capsule (20 mg) by mouth daily   Quantity:  30 capsule   Refills:  0       * amphetamine-dextroamphetamine 20 MG per 24 hr capsule   Commonly known as:  ADDERALL XR   This may have changed:  You were already taking a medication with the same name, and this prescription was added. Make sure you understand how and when to take each.   Used for:  Attention deficit hyperactivity disorder (ADHD), combined type   Changed by:  Nellie Wang APRN CNP        Dose:  20 mg   Start taking on:  4/6/2018   Take 1 capsule (20 mg) by mouth daily   Quantity:  30 capsule   Refills:  0       * Notice:  This list has 4 medication(s) that are the same as other medications prescribed for you. Read the directions carefully, and ask your doctor or other care provider to review them with you.         Where to get your medicines      Some of these will need a paper prescription and others can be bought over the counter.  Ask your nurse if you have questions.     Bring a paper prescription for each of these medications     amphetamine-dextroamphetamine 20 MG per 24 hr capsule    amphetamine-dextroamphetamine 20 MG per 24 hr capsule    amphetamine-dextroamphetamine 20 MG per 24 hr capsule                Primary Care Provider Office Phone #  Fax #    Latoya Elena [phone]       [address] [place] [id number]        Equal Access to Services     BRANDAN BARNHART : Lyudmila Quezada, waanada maggie, qaybta kaalmada stephiebillyhood, delio hernandez maulikkody gonzalezlaura cathymaramae isbell. So [facility] [phone].    ATENCIÓN: Si habla español, tiene a rosario disposición servicios gratuitos de asistencia lingüística. Llame al [phone].    We comply with applicable federal civil rights laws and [place] laws. We do not discriminate on the basis of race, color, national origin, age, disability, sex, sexual orientation, or gender identity.            Thank you!     Thank you for choosing [facility]  for your care. Our goal is always to provide you with excellent care. Hearing back from our patients is one way we can continue to improve our services. Please take a few minutes to complete the written survey that you may receive in the mail after your visit with us. Thank you!             Your Updated Medication List - Protect others around you: Learn how to safely use, store and throw away your medicines at www.disposemymeds.org.          This list is accurate as of 1/31/18  3:34 PM.  Always use your most recent med list.                   Brand Name Dispense Instructions for use Diagnosis    * ADDERALL XR PO      Take 20 mg by mouth        * amphetamine-dextroamphetamine 20 MG per 24 hr capsule   Start taking on:  2/5/2018    ADDERALL XR    30 capsule    Take 1 capsule (20 mg) by mouth daily    Attention deficit hyperactivity disorder (ADHD), combined type       * amphetamine-dextroamphetamine 20 MG per 24 hr capsule   Start taking on:  3/7/2018    ADDERALL XR    30 capsule    Take 1 capsule (20 mg) by mouth daily    Attention deficit hyperactivity disorder (ADHD), combined type       * amphetamine-dextroamphetamine 20 MG per 24 hr capsule   Start taking on:  4/6/2018    ADDERALL XR    30 capsule    Take 1 capsule (20 mg) by  mouth daily    Attention deficit hyperactivity disorder (ADHD), combined type       levonorgestrel 20 MCG/24HR IUD    MIRENA     1 each (20 mcg) by Intrauterine route continuous    Encounter for IUD insertion, Presence of intrauterine contraceptive device       loratadine 10 MG tablet    CLARITIN    30 tablet    Take 1 tablet (10 mg) by mouth daily    Chronic seasonal allergic rhinitis, unspecified trigger       Phenylephrine-Polyvinyl Alc 0.12-1.4 % Soln    REFRESH REDNESS RELIEF    1 Bottle    Apply 1 drop to eye 2 times daily as needed    Chronic seasonal allergic rhinitis, unspecified trigger       * Notice:  This list has 4 medication(s) that are the same as other medications prescribed for you. Read the directions carefully, and ask your doctor or other care provider to review them with you.

## 2018-01-31 NOTE — PATIENT INSTRUCTIONS
I will call you with your lab results.  Please schedule appointment to have Holter monitor applied at [facility].    Will call with additional treatment plan once this is finished.    May contact clinic in May for refills for an additional 3 month supply.  Will need to return to clinic in 6 months for reevaluation and urine drug screen    Thank you  Nellie Wang CNP

## 2018-01-31 NOTE — NURSING NOTE
"Chief Complaint   Patient presents with     Recheck Medication     ADDERALL     Panel Management     Height, tdap, DENISE       Initial /68 (BP Location: Left arm, Patient Position: Chair, Cuff Size: Adult Regular)  Pulse 68  Temp 99  F (37.2  C) (Oral)  Resp 16  Ht 5' 1.25\" (1.556 m)  Wt 139 lb (63 kg)  BMI 26.05 kg/m2 Estimated body mass index is 26.05 kg/(m^2) as calculated from the following:    Height as of this encounter: 5' 1.25\" (1.556 m).    Weight as of this encounter: 139 lb (63 kg).  Medication Reconciliation: complete  "

## 2018-02-02 NOTE — PROGRESS NOTES
Please advise Donald Duarte,  1995, that her lab results were normal thyroid, normal electrolyte, normal kidney function, and normal complete blood count no sign of anemia.  Please follow-up with Holter monitor as discussed in clinic for further evaluation thank you  [phone] (home)   Nellie Wang

## 2018-04-26 NOTE — PROGRESS NOTES
SUBJECTIVE:   Donald Duarte is a 22 year old female who presents to clinic today for the following health issues:      History of Present Illness     Diet:  Regular (no restrictions)  Frequency of exercise:  2-3 days/week  Duration of exercise:  45-60 minutes  Taking medications regularly:  Yes  Medication side effects:  None  Additional concerns today:  YES     Rt leg/ankle neuropathy  Was diagnosed with charcot devang disease in 2016 please see clinic notes from [facility] she states she is having a lot of trouble with gait impairment lately. She states she went on vacation with her family recently and had to use a wheelchair to get around.   She was to work with physical therapy and continue to see her podiatrist for orthotics and support however she states she did not do this due to cost.   She has asked therapists at her work what exercises she should be doing and she was told to swim and bike she is not currently participating in either.     After reviewing notes from neurology there is a confirmed diagnosis of chronic peripheral neuropathy and was to have lab work B1, B6, B12, E and Hgb A1c for confirmation of CMT I do not see the results of these labs. It is noted that if these labs are negative it would confirm the diagnosis of CMT. Recommendations included PT for gait and dorsiflexion weakness. And continued podiatry as stated above.     ADHD   SUBJECTIVE:  Patient is here today to recheck ADHD/ADD.    Updates since last visit: none   Routine for taking medicine, including time: 5 am   Time medicine wears off: 6 pm   Issues at school/Work: none   Issues at home: none   Control of symptoms: well controlled    Side effects:  Headaches: No  Stomach aches: No  Irritability/mood swings: Yes more anxiety lately   Difficulties with sleep: Yes sleeping more than she should lately   Social withdrawal: No  Unusual movements/tics: No  Decreased appetite: No  Other concerns: just the  increase in anxiety     Problem list and histories reviewed & adjusted, as indicated.  Additional history: as documented    Patient Active Problem List   Diagnosis     DENISE (generalized anxiety disorder)     Steppage gait     Pes valgus, unspecified laterality     Anxiety     Flu vaccine need     Attention deficit hyperactivity disorder (ADHD), combined type     Need for hepatitis B screening test     Presence of intrauterine contraceptive device     Tachycardia     Past Surgical History:   Procedure Laterality Date     DENTAL SURGERY      wisdom teeth       Social History   Substance Use Topics     Smoking status: Never Smoker     Smokeless tobacco: Never Used     Alcohol use No     Family History   Problem Relation Age of Onset     Hypertension Mother      Arthritis Paternal Grandmother      lupus     Breast Cancer Paternal Aunt          Current Outpatient Prescriptions   Medication Sig Dispense Refill     amphetamine-dextroamphetamine (ADDERALL XR) 20 MG per 24 hr capsule Take 1 capsule (20 mg) by mouth daily 30 capsule 0     amphetamine-dextroamphetamine (ADDERALL XR) 20 MG per 24 hr capsule Take 1 capsule (20 mg) by mouth daily 30 capsule 0     [START ON 5/28/2018] amphetamine-dextroamphetamine (ADDERALL XR) 20 MG per 24 hr capsule Take 1 capsule (20 mg) by mouth daily 30 capsule 0     [START ON 6/28/2018] amphetamine-dextroamphetamine (ADDERALL XR) 20 MG per 24 hr capsule Take 1 capsule (20 mg) by mouth daily 30 capsule 0     levonorgestrel (MIRENA) 20 MCG/24HR IUD 1 each (20 mcg) by Intrauterine route continuous       loratadine (CLARITIN) 10 MG tablet Take 1 tablet (10 mg) by mouth daily 30 tablet 1     Phenylephrine-Polyvinyl Alc (REFRESH REDNESS RELIEF) 0.12-1.4 % SOLN Apply 1 drop to eye 2 times daily as needed (Patient not taking: Reported on 4/27/2018) 1 Bottle 0     Allergies   Allergen Reactions     No Known Allergies      BP Readings from Last 3 Encounters:   04/27/18 118/70   01/31/18 108/68    11/21/17 110/68    Wt Readings from Last 3 Encounters:   04/27/18 135 lb 9.6 oz (61.5 kg)   01/31/18 139 lb (63 kg)   11/21/17 138 lb 8 oz (62.8 kg)                  Labs reviewed in EPIC    ROS:  Constitutional, HEENT, cardiovascular, pulmonary, gi and gu systems are negative, except as otherwise noted.    OBJECTIVE:     /70 (BP Location: Right arm, Patient Position: Chair, Cuff Size: Adult Regular)  Pulse 72  Temp 97.9  F (36.6  C) (Oral)  Resp 16  Wt 135 lb 9.6 oz (61.5 kg)  BMI 25.41 kg/m2  Body mass index is 25.41 kg/(m^2).  GENERAL: healthy, alert and no distress  EYES: Eyes grossly normal to inspection, PERRL and conjunctivae and sclerae normal  HENT: ear canals and TM's normal, nose and mouth without ulcers or lesions  NECK: no adenopathy, no asymmetry, masses, or scars and thyroid normal to palpation  RESP: lungs clear to auscultation - no rales, rhonchi or wheezes  CV: regular rate and rhythm, normal S1 S2, no S3 or S4, no murmur, click or rub, no peripheral edema and peripheral pulses strong  MS: no gross musculoskeletal defects noted, no edema. Right lateral ankle swelling non-tender, negative for warmth or erythema.   SKIN: no suspicious lesions or rashes  NEURO: weakness of bilateral feet with dorsiflexion R>L, sensory deficit intermittent pain right foot, mentation intact and gait abnormal: marching gait.  PSYCH: mentation appears normal, affect normal/bright        ASSESSMENT/PLAN:     1. Charcot-Marysol disease  Discussed physical therapy. Would like to see her back in 4-6 weeks if symptoms are not improving or stable. If worsening it was recommended by neurology to return to neuro for additional diagnostic studies.   - PHYSICAL THERAPY REFERRAL    2. Attention deficit hyperactivity disorder (ADHD), combined type  Refills given Drug screen completed. Discussed her fatigue I will check vitamin d level today.   - amphetamine-dextroamphetamine (ADDERALL XR) 20 MG per 24 hr capsule; Take 1  capsule (20 mg) by mouth daily  Dispense: 30 capsule; Refill: 0  - amphetamine-dextroamphetamine (ADDERALL XR) 20 MG per 24 hr capsule; Take 1 capsule (20 mg) by mouth daily  Dispense: 30 capsule; Refill: 0  - amphetamine-dextroamphetamine (ADDERALL XR) 20 MG per 24 hr capsule; Take 1 capsule (20 mg) by mouth daily  Dispense: 30 capsule; Refill: 0  - Drug  Screen Comprehensive , Urine with Reported Meds ([facility]) (Pain Care Package)    3. Fatigue, unspecified type    - Vitamin D Deficiency    Patient Instructions   I will call you with your lab result and any further treatment as indicated. I will review Jeronimo Gregg and notify you if recommend any additional treatment.     6 month follow up in clinic for ADHD    Please follow up with physical therapy as discussed for strengthening of ankles and feet.     Thank you  Nellie Wang [facility]  Answers for HPI/ROS submitted by the patient on 4/27/2018   PHQ-2 Score: 2  If you checked off any problems, how difficult have these problems made it for you to do your work, take care of things at home, or get along with other people?: Somewhat difficult  PHQ9 TOTAL SCORE: 10  DENISE 7 TOTAL SCORE: 5

## 2018-04-27 ENCOUNTER — OFFICE VISIT (OUTPATIENT)
Dept: FAMILY MEDICINE | Facility: OTHER | Age: 23
End: 2018-04-27
Payer: COMMERCIAL

## 2018-04-27 VITALS
HEART RATE: 72 BPM | WEIGHT: 135.6 LBS | BODY MASS INDEX: 25.41 KG/M2 | RESPIRATION RATE: 16 BRPM | TEMPERATURE: 97.9 F | DIASTOLIC BLOOD PRESSURE: 70 MMHG | SYSTOLIC BLOOD PRESSURE: 118 MMHG

## 2018-04-27 DIAGNOSIS — G60.0 CHARCOT-MARIE DISEASE: Primary | ICD-10-CM

## 2018-04-27 DIAGNOSIS — R53.83 FATIGUE, UNSPECIFIED TYPE: ICD-10-CM

## 2018-04-27 DIAGNOSIS — F90.2 ATTENTION DEFICIT HYPERACTIVITY DISORDER (ADHD), COMBINED TYPE: ICD-10-CM

## 2018-04-27 PROCEDURE — 36415 COLL VENOUS BLD VENIPUNCTURE: CPT | Performed by: NURSE PRACTITIONER

## 2018-04-27 PROCEDURE — 99214 OFFICE O/P EST MOD 30 MIN: CPT | Performed by: NURSE PRACTITIONER

## 2018-04-27 PROCEDURE — 99000 SPECIMEN HANDLING OFFICE-LAB: CPT | Performed by: NURSE PRACTITIONER

## 2018-04-27 PROCEDURE — 80307 DRUG TEST PRSMV CHEM ANLYZR: CPT | Mod: 90 | Performed by: NURSE PRACTITIONER

## 2018-04-27 PROCEDURE — 82306 VITAMIN D 25 HYDROXY: CPT | Performed by: NURSE PRACTITIONER

## 2018-04-27 RX ORDER — DEXTROAMPHETAMINE SACCHARATE, AMPHETAMINE ASPARTATE MONOHYDRATE, DEXTROAMPHETAMINE SULFATE AND AMPHETAMINE SULFATE 5; 5; 5; 5 MG/1; MG/1; MG/1; MG/1
20 CAPSULE, EXTENDED RELEASE ORAL DAILY
Qty: 30 CAPSULE | Refills: 0 | Status: SHIPPED | OUTPATIENT
Start: 2018-05-28 | End: 2018-06-27

## 2018-04-27 RX ORDER — DEXTROAMPHETAMINE SACCHARATE, AMPHETAMINE ASPARTATE MONOHYDRATE, DEXTROAMPHETAMINE SULFATE AND AMPHETAMINE SULFATE 5; 5; 5; 5 MG/1; MG/1; MG/1; MG/1
20 CAPSULE, EXTENDED RELEASE ORAL DAILY
Qty: 30 CAPSULE | Refills: 0 | Status: SHIPPED | OUTPATIENT
Start: 2018-04-27 | End: 2018-05-27

## 2018-04-27 RX ORDER — DEXTROAMPHETAMINE SACCHARATE, AMPHETAMINE ASPARTATE MONOHYDRATE, DEXTROAMPHETAMINE SULFATE AND AMPHETAMINE SULFATE 5; 5; 5; 5 MG/1; MG/1; MG/1; MG/1
20 CAPSULE, EXTENDED RELEASE ORAL DAILY
Qty: 30 CAPSULE | Refills: 0 | Status: SHIPPED | OUTPATIENT
Start: 2018-06-28 | End: 2018-08-29

## 2018-04-27 ASSESSMENT — ANXIETY QUESTIONNAIRES
2. NOT BEING ABLE TO STOP OR CONTROL WORRYING: SEVERAL DAYS
GAD7 TOTAL SCORE: 5
1. FEELING NERVOUS, ANXIOUS, OR ON EDGE: SEVERAL DAYS
GAD7 TOTAL SCORE: 5
7. FEELING AFRAID AS IF SOMETHING AWFUL MIGHT HAPPEN: NOT AT ALL
7. FEELING AFRAID AS IF SOMETHING AWFUL MIGHT HAPPEN: NOT AT ALL
3. WORRYING TOO MUCH ABOUT DIFFERENT THINGS: MORE THAN HALF THE DAYS
4. TROUBLE RELAXING: NOT AT ALL
6. BECOMING EASILY ANNOYED OR IRRITABLE: SEVERAL DAYS
5. BEING SO RESTLESS THAT IT IS HARD TO SIT STILL: NOT AT ALL
GAD7 TOTAL SCORE: 5

## 2018-04-27 ASSESSMENT — PATIENT HEALTH QUESTIONNAIRE - PHQ9
10. IF YOU CHECKED OFF ANY PROBLEMS, HOW DIFFICULT HAVE THESE PROBLEMS MADE IT FOR YOU TO DO YOUR WORK, TAKE CARE OF THINGS AT HOME, OR GET ALONG WITH OTHER PEOPLE: SOMEWHAT DIFFICULT
SUM OF ALL RESPONSES TO PHQ QUESTIONS 1-9: 10
SUM OF ALL RESPONSES TO PHQ QUESTIONS 1-9: 10

## 2018-04-27 NOTE — MR AVS SNAPSHOT
After Visit Summary   4/27/2018    Donald Duarte    MRN: [id number]           Patient Information     Date Of Birth          1995        Visit Information        Provider Department      4/27/2018 4:00 PM Nellie Wang APRN CNP [facility]        Today's Diagnoses     Charcot-Marysol disease    -  1    Attention deficit hyperactivity disorder (ADHD), combined type        Fatigue, unspecified type          Care Instructions    I will call you with your lab result and any further treatment as indicated. I will review Charcot Marysol and notify you if recommend any additional treatment.     6 month follow up in clinic for ADHD    Please follow up with physical therapy as discussed for strengthening of ankles and feet.     Thank you  Nellie Wang CNP            Follow-ups after your visit        Additional Services     PHYSICAL THERAPY REFERRAL       *This therapy referral will be filtered to a centralized scheduling office at [facility] and the patient will receive a call to schedule an appointment at a [facility] location most convenient for them. *     [facility] provides Physical Therapy evaluation and treatment and many specialty services across the [facility] system.  If requesting a specialty program, please choose from the list below.    If you have not heard from the scheduling office within 2 business days, please call [phone] for all locations, with the exception of [place], please call [phone] and [facility], please call [phone]  Treatment: Evaluation & Treatment  Special Instructions/Modalities: Charcot Marysol bilateral foot/ankle weakness. Balance and strength.   Special Programs:     Please be aware that coverage of these services is subject to the terms and limitations of your health insurance plan.  Call member services at your health plan with any benefit or coverage questions.      **Note to  "Provider:  If you are referring outside of [place] for the therapy appointment, please list the name of the location in the \"special instructions\" above, print the referral and give to the patient to schedule the appointment.                  Who to contact     If you have questions or need follow up information about today's clinic visit or your schedule please contact [facility] directly at [phone].  Normal or non-critical lab and imaging results will be communicated to you by MyChart, letter or phone within 4 business days after the clinic has received the results. If you do not hear from us within 7 days, please contact the clinic through MyChart or phone. If you have a critical or abnormal lab result, we will notify you by phone as soon as possible.  Submit refill requests through [employer] or call your pharmacy and they will forward the refill request to us. Please allow 3 business days for your refill to be completed.          Additional Information About Your Visit        Care EveryWhere ID     This is your Care EveryWhere ID. This could be used by other organizations to access your [place] medical records  [id number]        Your Vitals Were     Pulse Temperature Respirations BMI (Body Mass Index)          72 97.9  F (36.6  C) (Oral) 16 25.41 kg/m2         Blood Pressure from Last 3 Encounters:   04/27/18 118/70   01/31/18 108/68   11/21/17 110/68    Weight from Last 3 Encounters:   04/27/18 135 lb 9.6 oz (61.5 kg)   01/31/18 139 lb (63 kg)   11/21/17 138 lb 8 oz (62.8 kg)              We Performed the Following     Drug  Screen Comprehensive , Urine with Reported Meds (MedTox) (Pain Care Package)     PHYSICAL THERAPY REFERRAL     Vitamin D Deficiency          Today's Medication Changes          These changes are accurate as of 4/27/18  4:43 PM.  If you have any questions, ask your nurse or doctor.               These medicines have changed or have updated prescriptions.        " Dose/Directions    * amphetamine-dextroamphetamine 20 MG per 24 hr capsule   Commonly known as:  ADDERALL XR   This may have changed:  Another medication with the same name was added. Make sure you understand how and when to take each.   Used for:  Attention deficit hyperactivity disorder (ADHD), combined type   Changed by:  Nellie Wang APRN CNP        Dose:  20 mg   Take 1 capsule (20 mg) by mouth daily   Quantity:  30 capsule   Refills:  0       * amphetamine-dextroamphetamine 20 MG per 24 hr capsule   Commonly known as:  ADDERALL XR   This may have changed:  You were already taking a medication with the same name, and this prescription was added. Make sure you understand how and when to take each.   Used for:  Attention deficit hyperactivity disorder (ADHD), combined type   Changed by:  Nellie Wang APRN CNP        Dose:  20 mg   Take 1 capsule (20 mg) by mouth daily   Quantity:  30 capsule   Refills:  0       * amphetamine-dextroamphetamine 20 MG per 24 hr capsule   Commonly known as:  ADDERALL XR   This may have changed:  You were already taking a medication with the same name, and this prescription was added. Make sure you understand how and when to take each.   Used for:  Attention deficit hyperactivity disorder (ADHD), combined type   Changed by:  Nellie Wang APRN CNP        Dose:  20 mg   Start taking on:  5/28/2018   Take 1 capsule (20 mg) by mouth daily   Quantity:  30 capsule   Refills:  0       * amphetamine-dextroamphetamine 20 MG per 24 hr capsule   Commonly known as:  ADDERALL XR   This may have changed:  You were already taking a medication with the same name, and this prescription was added. Make sure you understand how and when to take each.   Used for:  Attention deficit hyperactivity disorder (ADHD), combined type   Changed by:  Nellie Wang APRN CNP        Dose:  20 mg   Start taking on:  6/28/2018   Take 1 capsule (20 mg) by mouth daily    Quantity:  30 capsule   Refills:  0       * Notice:  This list has 4 medication(s) that are the same as other medications prescribed for you. Read the directions carefully, and ask your doctor or other care provider to review them with you.         Where to get your medicines      Some of these will need a paper prescription and others can be bought over the counter.  Ask your nurse if you have questions.     Bring a paper prescription for each of these medications     amphetamine-dextroamphetamine 20 MG per 24 hr capsule    amphetamine-dextroamphetamine 20 MG per 24 hr capsule    amphetamine-dextroamphetamine 20 MG per 24 hr capsule                Primary Care Provider Office Phone # Fax #    Latoya Elena [phone]       [address] [address]        Equal Access to Services     [facility] HospitalSHANNAN : Hadii artis stanleyo Sotata, waaxda luqadaha, qaybta kaalmada stephieyahood, delio braswell . [facility] [phone].    ATENCIÓN: Si habla español, tiene a rosario disposición servicios gratuitos de asistencia lingüística. [facility] [phone].    We comply with applicable federal civil rights laws and [place] laws. We do not discriminate on the basis of race, color, national origin, age, disability, sex, sexual orientation, or gender identity.            Thank you!     Thank you for choosing [facility]  for your care. Our goal is always to provide you with excellent care. Hearing back from our patients is one way we can continue to improve our services. Please take a few minutes to complete the written survey that you may receive in the mail after your visit with us. Thank you!             Your Updated Medication List - Protect others around you: Learn how to safely use, store and throw away your medicines at www.disposemymeds.org.          This list is accurate as of 4/27/18  4:43 PM.  Always use your most recent med list.                    Brand Name Dispense Instructions for use Diagnosis    * amphetamine-dextroamphetamine 20 MG per 24 hr capsule    ADDERALL XR    30 capsule    Take 1 capsule (20 mg) by mouth daily    Attention deficit hyperactivity disorder (ADHD), combined type       * amphetamine-dextroamphetamine 20 MG per 24 hr capsule    ADDERALL XR    30 capsule    Take 1 capsule (20 mg) by mouth daily    Attention deficit hyperactivity disorder (ADHD), combined type       * amphetamine-dextroamphetamine 20 MG per 24 hr capsule   Start taking on:  5/28/2018    ADDERALL XR    30 capsule    Take 1 capsule (20 mg) by mouth daily    Attention deficit hyperactivity disorder (ADHD), combined type       * amphetamine-dextroamphetamine 20 MG per 24 hr capsule   Start taking on:  6/28/2018    ADDERALL XR    30 capsule    Take 1 capsule (20 mg) by mouth daily    Attention deficit hyperactivity disorder (ADHD), combined type       levonorgestrel 20 MCG/24HR IUD    MIRENA     1 each (20 mcg) by Intrauterine route continuous    Encounter for IUD insertion, Presence of intrauterine contraceptive device       loratadine 10 MG tablet    CLARITIN    30 tablet    Take 1 tablet (10 mg) by mouth daily    Chronic seasonal allergic rhinitis, unspecified trigger       Phenylephrine-Polyvinyl Alc 0.12-1.4 % Soln    REFRESH REDNESS RELIEF    1 Bottle    Apply 1 drop to eye 2 times daily as needed    Chronic seasonal allergic rhinitis, unspecified trigger       * Notice:  This list has 4 medication(s) that are the same as other medications prescribed for you. Read the directions carefully, and ask your doctor or other care provider to review them with you.

## 2018-04-27 NOTE — NURSING NOTE
"Chief Complaint   Patient presents with     A.D.H.D     Panel Management     mychart, hiv, phq, ciro       Initial /70 (BP Location: Right arm, Patient Position: Chair, Cuff Size: Adult Regular)  Pulse 72  Temp 97.9  F (36.6  C) (Oral)  Resp 16  Wt 135 lb 9.6 oz (61.5 kg)  BMI 25.41 kg/m2 Estimated body mass index is 25.41 kg/(m^2) as calculated from the following:    Height as of 1/31/18: 5' 1.25\" (1.556 m).    Weight as of this encounter: 135 lb 9.6 oz (61.5 kg).  Medication Reconciliation: complete    "

## 2018-04-27 NOTE — LETTER
May 1, 2018      Donald DAIN Gerald  [address] [place] [address]        Dear ,    We are writing to inform you of your test results.    Your test results fall within the expected range(s) or remain unchanged from previous results.  Please continue with current treatment plan.    Resulted Orders   Vitamin D Deficiency   Result Value Ref Range    Vitamin D Deficiency screening 24 20 - 75 ug/L      Comment:      Season, race, dietary intake, and treatment affect the concentration of   25-hydroxy-Vitamin D. Values may decrease during winter months and increase   during summer months. Values 20-29 ug/L may indicate Vitamin D insufficiency   and values <20 ug/L may indicate Vitamin D deficiency.  Vitamin D determination is routinely performed by an immunoassay specific for   25 hydroxyvitamin D3.  If an individual is on vitamin D2 (ergocalciferol)   supplementation, please specify 25 OH vitamin D2 and D3 level determination by   LCMSMS test [id number].         If you have any questions or concerns, please call the clinic at the number listed above.       Sincerely,        JAMES Rivas CNP

## 2018-04-27 NOTE — PATIENT INSTRUCTIONS
I will call you with your lab result and any further treatment as indicated. I will review Jeronimo Gregg and notify you if recommend any additional treatment.     6 month follow up in clinic for ADHD    Please follow up with physical therapy as discussed for strengthening of ankles and feet.     Thank you  Nellie Wang CNP

## 2018-04-28 ASSESSMENT — PATIENT HEALTH QUESTIONNAIRE - PHQ9: SUM OF ALL RESPONSES TO PHQ QUESTIONS 1-9: 10

## 2018-04-28 ASSESSMENT — ANXIETY QUESTIONNAIRES: GAD7 TOTAL SCORE: 5

## 2018-04-29 ENCOUNTER — TELEPHONE (OUTPATIENT)
Dept: FAMILY MEDICINE | Facility: OTHER | Age: 23
End: 2018-04-29

## 2018-04-29 DIAGNOSIS — G60.0 CHARCOT-MARIE DISEASE: Primary | ICD-10-CM

## 2018-04-29 PROBLEM — F40.10 SOCIAL ANXIETY DISORDER: Status: ACTIVE | Noted: 2018-04-29

## 2018-04-29 NOTE — TELEPHONE ENCOUNTER
Please notify Donald and let her know that I reviewed her neurology consult and would like to know if she had the lab work done through them as well it would have been (vitamin B1, B6, B12, E and Hgb A1c). If she did then I would like to contact the [facility] for these results to be entered into epic.     I recommend she work with PT for 4-6 weeks and return to podiatry for orthotics as this was recommendations from neurology specialty. I will place referral for her to see podiatry for orthotics and further evaluation. She can see Dr. Caba in [place].     If symptoms are not improving or stabilizing in 4-6 weeks I recommend she follow up with neurology as this was also stated by neurology specialty, to return to neurology if symptoms decline quickly for further diagnostic studies.     Please help Donald schedule appointments as needed.     Thank you  Nellie Wang CNP

## 2018-04-30 LAB — DEPRECATED CALCIDIOL+CALCIFEROL SERPL-MC: 24 UG/L (ref 20–75)

## 2018-04-30 NOTE — TELEPHONE ENCOUNTER
She did have her labs done [facility].  She will call them to have those labs released to us for WS review.  She said that podiatry has already reached her for an appointment.  No further questions at this time.  Mahendra Caldera, CMA

## 2018-05-01 NOTE — PROGRESS NOTES
Please advise Donald Duarte,  1995, that her lab results were normal vitamin D level  [phone] (home)   Thank you  Nellie Wang CNP

## 2018-05-03 LAB — PAIN DRUG SCR UR W RPTD MEDS: NORMAL

## 2018-05-08 ENCOUNTER — OFFICE VISIT (OUTPATIENT)
Dept: PODIATRY | Facility: OTHER | Age: 23
End: 2018-05-08
Payer: COMMERCIAL

## 2018-05-08 ENCOUNTER — RADIANT APPOINTMENT (OUTPATIENT)
Dept: GENERAL RADIOLOGY | Facility: OTHER | Age: 23
End: 2018-05-08
Attending: PODIATRIST
Payer: COMMERCIAL

## 2018-05-08 ENCOUNTER — PATIENT OUTREACH (OUTPATIENT)
Dept: CARE COORDINATION | Facility: CLINIC | Age: 23
End: 2018-05-08

## 2018-05-08 VITALS
BODY MASS INDEX: 26.06 KG/M2 | DIASTOLIC BLOOD PRESSURE: 68 MMHG | HEIGHT: 61 IN | SYSTOLIC BLOOD PRESSURE: 116 MMHG | WEIGHT: 138 LBS | TEMPERATURE: 98.7 F

## 2018-05-08 DIAGNOSIS — G60.0 CHARCOT-MARIE DISEASE: Primary | ICD-10-CM

## 2018-05-08 DIAGNOSIS — G60.0 CMT (CHARCOT-MARIE-TOOTH DISEASE): Primary | ICD-10-CM

## 2018-05-08 DIAGNOSIS — M65.979 SYNOVITIS OF ANKLE: ICD-10-CM

## 2018-05-08 DIAGNOSIS — R26.89 STEPPAGE GAIT: ICD-10-CM

## 2018-05-08 DIAGNOSIS — M24.573 EQUINUS CONTRACTURE OF ANKLE: ICD-10-CM

## 2018-05-08 PROCEDURE — 99213 OFFICE O/P EST LOW 20 MIN: CPT | Performed by: PODIATRIST

## 2018-05-08 PROCEDURE — 73610 X-RAY EXAM OF ANKLE: CPT | Mod: RT

## 2018-05-08 ASSESSMENT — PAIN SCALES - GENERAL: PAINLEVEL: MILD PAIN (2)

## 2018-05-08 NOTE — PROGRESS NOTES
Clinic Care Coordination Contact  [facility]/Voicemail       Clinical Data: Care Coordinator Outreach  Outreach attempted x 1.  Left message on Mothers voicemail with call back information and requested return call. Unable to leave message on pt's vm is full.   Plan: Care Coordinator will try to reach patient again in 1-2 business days.      Jing HANNA, RN, PHN  Care Coordination    [facility]  [address] [place] [address]  Office: [phone]  Fax [phone]   [facility]  [address] [place] [address]  Office: [phone] Fax [phone]  [email or web address]   [email or web address]   Connect with [facility] on social media.

## 2018-05-08 NOTE — LETTER
"    5/8/2018         RE: Donald Duarte  [address] [place] [address]        Dear Colleague,    Thank you for referring your patient, Donald Duarte, to the [facility]. Please see a copy of my visit note below.    Chief Complaint   Patient presents with     Results     9/6/2016 - [facility]     RECHECK     presents today w/ cont'd lateral Right ankle pain 2 and flat casual shoes, onset 2014; XR R ankle today, 5/8/18; LOV 8/9/2016       Weight management plan: Patient was referred to their PCP to discuss a diet and exercise plan.     HPI:   Rt ankle lateral after injury 12/14.  And describes a prominence and some discomfort about the lateral distal fibula and peroneal tendons. However after further questioning her mother is quite upset about what is happened with her child over the last 4 years. She states that as a young adult she has developed what she calls a dropfoot she's having difficulty walking. The mother requests I immediately examine her ability to walk.  Patient states that she stopped dancing as a alexy in high school because her feet stopped working. Patient denies any back injuries or back pain throughout her life.    Walks funny, dropfoot, CMT dged 2016.  Has done no PT, but does at home.  On adderall since 2017. School for nursing, now [employer].       EXAM:Vitals: /68 (BP Location: Right arm, Cuff Size: Adult Regular)  Temp 98.7  F (37.1  C) (Temporal)  Ht 5' 1.25\" (1.556 m)  Wt 138 lb (62.6 kg)  BMI 25.86 kg/m2  BMI= Body mass index is 25.86 kg/(m^2).    General appearance: Patient is alert and fully cooperative with history & exam.  No sign of distress is noted during the visit.     Psychiatric: Affect is pleasant & appropriate.  Patient appears motivated to improve health.     Respiratory: Breathing is regular & unlabored while sitting.     HEENT: Hearing is intact to spoken word.  Speech is clear.  No gross evidence of visual " impairment that would impact ambulation.     Vascular: DP & PT pulses are intact & regular bilaterally.  No significant edema or varicosities noted.  CFT and skin temperature is normal to both lower extremities.     Neurologic: Lower extremity sensation is intact to light touch.  Manual muscle strength is definitely diminished equal bilateral +4/5 bilateral. Patient is very clearly hyporeflexive, pronounced, about the plantar response and Achilles response equal bilateral.    Dermatologic: Skin is intact to both lower extremities without significant lesions, rash or abrasion.  No paronychia or evidence of soft tissue infection is noted.     Musculoskeletal: Calf circ 35 cm on left and 34cm on right.  She is not able to balance on each foot alone and not able to perform unilateral toe raise. During ambulation a clear steppage gait with weakened dorsiflexion is noted. This patient has crepitus throughout the peroneal tendons equal bilateral and there does appear to be some prominence or thickening of the ankle joint capsule anterior to the distal fibula of the right ankle however upon direct palpation to this area does not appear to be edema nor abscess. This would be consistent with a previous ankle injury. Negative anterior drawer bilateral. Peroneal tendons are weak equal bilateral.     ASSESSMENT:       ICD-10-CM    1. 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 Equinus contracture of ankle M24.573 ORTHOTICS REFERRAL   3. Steppage gait R26.89    4. Synovitis of ankle M65.9         PLAN:  Reviewed patient's chart in [facility].      8/9/2016   This patient has a pronounced neurological deficit bilateral lower extremities  Her family medical history is unremarkable as noted above  Referral to neurology to investigate why she has steppage gait, pronounced hyporeflexive equal bilateral, general weakness both legs, do not perform unilateral balance or unilateral  toe raise or walk on her heels, stopped dance as alexy   She does have flat feet and week dorsiflexion and generalized low muscle strength and tone  She has an underlying neurological issue  Order for orthotics to support feet and will order PT after neurology if needed  She does not have a tendon rupture but is overusing peroneal and has very weak muscle tone.  An appointment was made for me to follow up in about 5 weeks, hopefully after neurology has evaluated her.    5/8/2018  Patient has been given a diagnosis of CMT per neurologist but has not followed with a neurologist in 2 years.  She feels her legs have gotten weaker and she has done no physical therapy other than at home.  When she increases her activities such as medication she has significant pain and feels weakness about her lateral ankle from time to time but denies any treatment of injuries over the last 2 years.    Recommended physical therapy order placed today  We discussed bracing and proper shoe gear written instructions dispensed  Bracing may start with custom molded orthotics and progress through AFOs over the ankle even full-length ankle-foot orthosis however bracing above the ankle may also weaken her ankle over time especially if she is not utilizing any physical therapy in her life.  We discussed these options and she wishes to proceed with physical therapy changes in shoe gear and custom molded orthotics.  Will refer to the orthotist for this as well.  Also dispensed a night splint to utilize throughout the night to encourage maintaining ankle dorsiflexion without limiting ankle stabilization her strength over time.    Will progress to AFO over time.    Also encouraged her to discover seem to support groups.     In addition to the above history and physical exam, approximately 25 minutes of time was spent with the patient, greater than 50% directly with the patient, counseling, educating, and coordinating care in regards to the above  noted multiple diagnoses in addition to performing the documented procedure.      David Caba DPM      Again, thank you for allowing me to participate in the care of your patient.        Sincerely,        David Caba DPM

## 2018-05-08 NOTE — PROGRESS NOTES
"Chief Complaint   Patient presents with     Results     9/6/2016 - [facility]     RECHECK     presents today w/ cont'd lateral Right ankle pain 2 and flat casual shoes, onset 2014; XR R ankle today, 5/8/18; LOV 8/9/2016     Weight management plan: Patient was referred to their PCP to discuss a diet and exercise plan.     HPI:   Rt ankle lateral after injury 12/14.  And describes a prominence and some discomfort about the lateral distal fibula and peroneal tendons. However after further questioning her mother is quite upset about what is happened with her child over the last 4 years. She states that as a young adult she has developed what she calls a dropfoot she's having difficulty walking. The mother requests I immediately examine her ability to walk.  Patient states that she stopped dancing as a alexy in high school because her feet stopped working. Patient denies any back injuries or back pain throughout her life.    Walks funny, dropfoot, CMT dged 2016.  Has done no PT, but does at home.  On adderall since 2017. School for nursing, now CNA.    EXAM:Vitals: /68 (BP Location: Right arm, Cuff Size: Adult Regular)  Temp 98.7  F (37.1  C) (Temporal)  Ht 5' 1.25\" (1.556 m)  Wt 138 lb (62.6 kg)  BMI 25.86 kg/m2  BMI= Body mass index is 25.86 kg/(m^2).    General appearance: Patient is alert and fully cooperative with history & exam.  No sign of distress is noted during the visit.     Psychiatric: Affect is pleasant & appropriate.  Patient appears motivated to improve health.     Respiratory: Breathing is regular & unlabored while sitting.     HEENT: Hearing is intact to spoken word.  Speech is clear.  No gross evidence of visual impairment that would impact ambulation.     Vascular: DP & PT pulses are intact & regular bilaterally.  No significant edema or varicosities noted.  CFT and skin temperature is normal to both lower extremities.     Neurologic: Lower extremity sensation is intact " to light touch.  Manual muscle strength is definitely diminished equal bilateral +4/5 bilateral. Patient is very clearly hyporeflexive, pronounced, about the plantar response and Achilles response equal bilateral.    Dermatologic: Skin is intact to both lower extremities without significant lesions, rash or abrasion.  No paronychia or evidence of soft tissue infection is noted.     Musculoskeletal: Calf circ 35 cm on left and 34cm on right.  She is not able to balance on each foot alone and not able to perform unilateral toe raise. During ambulation a clear steppage gait with weakened dorsiflexion is noted. This patient has crepitus throughout the peroneal tendons equal bilateral and there does appear to be some prominence or thickening of the ankle joint capsule anterior to the distal fibula of the right ankle however upon direct palpation to this area does not appear to be edema nor abscess. This would be consistent with a previous ankle injury. Negative anterior drawer bilateral. Peroneal tendons are weak equal bilateral.     ASSESSMENT:       ICD-10-CM    1. 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 Equinus contracture of ankle M24.573 ORTHOTICS REFERRAL     CARE COORDINATION REFERRAL   3. Steppage gait R26.89 CARE COORDINATION REFERRAL   4. Synovitis of ankle M65.9 CARE COORDINATION REFERRAL        PLAN:  Reviewed patient's chart in [facility].      8/9/2016   This patient has a pronounced neurological deficit bilateral lower extremities  Her family medical history is unremarkable as noted above  Referral to neurology to investigate why she has steppage gait, pronounced hyporeflexive equal bilateral, general weakness both legs, do not perform unilateral balance or unilateral toe raise or walk on her heels, stopped dance as alexy   She does have flat feet and week dorsiflexion and generalized low muscle strength and  tone  She has an underlying neurological issue  Order for orthotics to support feet and will order PT after neurology if needed  She does not have a tendon rupture but is overusing peroneal and has very weak muscle tone.  An appointment was made for me to follow up in about 5 weeks, hopefully after neurology has evaluated her.    5/8/2018  Patient has been given a diagnosis of CMT per neurologist but has not followed with a neurologist in 2 years.  She feels her legs have gotten weaker and she has done no physical therapy other than at home.  When she increases her activities such as medication she has significant pain and feels weakness about her lateral ankle from time to time but denies any treatment of injuries over the last 2 years.    Recommended physical therapy order placed today  We discussed bracing and proper shoe gear written instructions dispensed  Bracing may start with custom molded orthotics and progress through AFOs over the ankle even full-length ankle-foot orthosis however bracing above the ankle may also weaken her ankle over time especially if she is not utilizing any physical therapy in her life.  We discussed these options and she wishes to proceed with physical therapy changes in shoe gear and custom molded orthotics.  Will refer to the orthotist for this as well.  Also dispensed a night splint to utilize throughout the night to encourage maintaining ankle dorsiflexion without limiting ankle stabilization her strength over time.    Will progress to AFO over time.    Also encouraged her to discover seem to support groups.     In addition to the above history and physical exam, approximately 25 minutes of time was spent with the patient, greater than 50% directly with the patient, counseling, educating, and coordinating care in regards to the above noted multiple diagnoses in addition to performing the documented procedure.    Referral to the disciplinary care team [facility] [place]  with Dr. Meyer.     Follow-up with me as needed.       David Caba DPM

## 2018-05-08 NOTE — PATIENT INSTRUCTIONS
[facility] clinic    Reliable shoe stores: To maximize your experience and provide the best possible fit.  Be sure to show them your foot concerns and tell them Dr. Caba sent you.      Stores listed in bold have only athletic shoes, and stores that are not bold are mostly casual or variety of shoes    [employer]  [address] [place] [address]  [phone]    [employer] [place]  [address] [place] [address]  [phone]    [employer] [place]  [address] [place] [address]  [phone]    [employer]  [address]  [facility] [address]  [phone]    [employer]  [address] [place] [address]  [phone]    [employer]  [address] [place] [address]  [phone]                         [employer] Locations:     [address] [place] [address]   [phone]     [address] [place] [address] [address] [place] [address]   [phone]     [address] [place] [address]   [phone]     [address] [place] [place] [address]   [phone]     [address] [place] [address]   [phone]     [address] [place] [address]   [phone]     [address] [facility] [address]   [place] [address]   [phone]     [address] [facility]   [place] [address]   [phone]             How to find reasonable shoes          The correct width    Correct Fitting    Correct Length      Foot Distortion    Posture Distortion                          Torsional Rigidity      Grasp behind the heel and underneath the foot and twist      Bad    Excessive torsion/twist in midfoot     Less torsion/twist in midfoot is better                   Heel Counter Rigidity      Grasp just above   midsole and squeeze      Bad    Soft heel counter      Good    Rigid Heel Counter      Flexion Rigidity      Grasp shoe and bend from forefoot to rearfoot            Dr. Eyal Hickman  Orthopaedic  surgery   [address], [place] [address]   [phone]

## 2018-05-08 NOTE — MR AVS SNAPSHOT
After Visit Summary   5/8/2018    Donald Duarte    MRN: [id number]           Patient Information     Date Of Birth          1995        Visit Information        Provider Department      5/8/2018 9:00 AM David Caba, [facility]        Today's Diagnoses     Chronic pain of right ankle    -  1    CMT (Charcot-Marysol-Tooth disease)        Equinus contracture of ankle          Care Instructions    Reliable shoe stores: To maximize your experience and provide the best possible fit.  Be sure to show them your foot concerns and tell them Dr. Caba sent you.      Stores listed in bold have only athletic shoes, and stores that are not bold are mostly casual or variety of shoes    [employer]  [address] [place] [address]  [phone]    [employer] [facility]  [address] [place] [address]  [phone]    [employer] [place] [place]  [address] [place] [address]  [phone]    [employer]  [address] [facility] [address]  [phone]    [employer]  [address] [place], [place] [address] [phone]    [employer]  [address] [place] [address]  [phone]                         [employer] Locations:     [address] [place] [address]   [phone]     [address] [place] [address]   [phone]     [address] [place] [address]   [phone]     [address] [place] [address]   [phone]     [address] [place] [address]   [phone]     [address] [place] [address]   [phone]     [address] [facility] [address]   [place] [address]   [phone]     [address]   [place] [address]   [phone]             How to find reasonable shoes          The correct width    Correct Fitting    Correct Length      Foot Distortion    Posture Distortion                       "    Torsional Rigidity      Grasp behind the heel and underneath the foot and twist      Bad    Excessive torsion/twist in midfoot     Less torsion/twist in midfoot is better                   Heel Counter Rigidity      Grasp just above   midsole and squeeze      Bad    Soft heel counter      Good    Rigid Heel Counter      Flexion Rigidity      Grasp shoe and bend from forefoot to rearfoot            Dr. Eyal Hickman  Orthopaedic surgery   [address] [facility] [address], [place] [address]   [phone]            Follow-ups after your visit        Additional Services     WIL PT, HAND, AND CHIROPRACTIC REFERRAL       CMT.  Life long stretching and proprioception training.  Keep it fun and engaging to encourage chronic cares.            ORTHOTICS REFERRAL       [facility] scheduling staff may contact patient to arrange appointments for casting of orthotics and often do not leave messages.  The patient may call this number for scheduling at their convenience. Scheduling Phone [phone].      One pair custom functional foot orthotics.   Flexible polypropylene shell with 1/8\" Spenco cushioned top cover, crepe rearfoot post, medial density arch fill, SUZAN bottom cover.  Aerobic model.      May progress to AFO in time.  eval and treat, subs permitted.                  Who to contact     If you have questions or need follow up information about today's clinic visit or your schedule please contact [facility] directly at [phone].  Normal or non-critical lab and imaging results will be communicated to you by MyChart, letter or phone within 4 business days after the clinic has received the results. If you do not hear from us within 7 days, please contact the clinic through MyChart or phone. If you have a critical or abnormal lab result, we will notify you by phone as soon as possible.  Submit refill requests through [employer] or call your pharmacy and they will forward the refill request to us. Please " "allow 3 business days for your refill to be completed.          Additional Information About Your Visit        Care EveryWhere ID     This is your Care EveryWhere ID. This could be used by other organizations to access your [facility] medical records  [id number]        Your Vitals Were     Temperature Height BMI (Body Mass Index)             98.7  F (37.1  C) (Temporal) 5' 1.25\" (1.556 m) 25.86 kg/m2          Blood Pressure from Last 3 Encounters:   05/08/18 116/68   04/27/18 118/70   01/31/18 108/68    Weight from Last 3 Encounters:   05/08/18 138 lb (62.6 kg)   04/27/18 135 lb 9.6 oz (61.5 kg)   01/31/18 139 lb (63 kg)              We Performed the Following     WIL PT, HAND, AND CHIROPRACTIC REFERRAL     ORTHOTICS REFERRAL     XR Ankle Right G/E 3 Views        Primary Care Provider Office Phone # Fax #    Latoya Elena [phone]       [address] [place] [address]        Equal Access to Services     [facility]: Hadii aad ku hadasho Soomaali, waaxda luqadaha, qaybta kaalmada adeegyada, waxcarlota braswell . So [facility] [phone].    ATENCIÓN: Si habla español, tiene a rosario disposición servicios gratuitos de asistencia lingüística. Jignesh al [phone].    We comply with applicable federal civil rights laws and [place] laws. We do not discriminate on the basis of race, color, national origin, age, disability, sex, sexual orientation, or gender identity.            Thank you!     Thank you for choosing [facility]  for your care. Our goal is always to provide you with excellent care. Hearing back from our patients is one way we can continue to improve our services. Please take a few minutes to complete the written survey that you may receive in the mail after your visit with us. Thank you!             Your Updated Medication List - Protect others around you: Learn how to safely use, store and throw away your medicines at www.disposemymeds.org. "          This list is accurate as of 5/8/18 10:00 AM.  Always use your most recent med list.                   Brand Name Dispense Instructions for use Diagnosis    * amphetamine-dextroamphetamine 20 MG per 24 hr capsule    ADDERALL XR    30 capsule    Take 1 capsule (20 mg) by mouth daily    Attention deficit hyperactivity disorder (ADHD), combined type       * amphetamine-dextroamphetamine 20 MG per 24 hr capsule   Start taking on:  5/28/2018    ADDERALL XR    30 capsule    Take 1 capsule (20 mg) by mouth daily    Attention deficit hyperactivity disorder (ADHD), combined type       * amphetamine-dextroamphetamine 20 MG per 24 hr capsule   Start taking on:  6/28/2018    ADDERALL XR    30 capsule    Take 1 capsule (20 mg) by mouth daily    Attention deficit hyperactivity disorder (ADHD), combined type       levonorgestrel 20 MCG/24HR IUD    MIRENA     1 each (20 mcg) by Intrauterine route continuous    Encounter for IUD insertion, Presence of intrauterine contraceptive device       loratadine 10 MG tablet    CLARITIN    30 tablet    Take 1 tablet (10 mg) by mouth daily    Chronic seasonal allergic rhinitis, unspecified trigger       Phenylephrine-Polyvinyl Alc 0.12-1.4 % Soln    REFRESH REDNESS RELIEF    1 Bottle    Apply 1 drop to eye 2 times daily as needed    Chronic seasonal allergic rhinitis, unspecified trigger       * Notice:  This list has 3 medication(s) that are the same as other medications prescribed for you. Read the directions carefully, and ask your doctor or other care provider to review them with you.

## 2018-05-08 NOTE — LETTER
[facility] CARE COORDINATION  [facility]  [address] [place] [address]   [address]  [phone]  Fax [phone]      Donald Duarte  [address] [facility] [address]      Dear Donald,    I am a clinic care coordinator who works with JAMES Rivas CNP at [facility]. I recently tried to call and was unable to reach you. I wanted to introduce myself and provide you with my contact information so that you can call me with questions or concerns about your health care. In addition, Dr. Caba as asked that I help get you set up with the [facility] clinic in [place]. A referral has already been sent and someone should be calling to set you up with an appt. Please let me know if you have not received a call.  Below is a description of clinic care coordination and how I can further assist you.    The clinic care coordinator is a registered nurse and/or  who understand the health care system. The goal of clinic care coordination is to help you manage your health and improve access to the [employer] system in the most efficient manner. The registered nurse can assist you in meeting your health care goals by providing education, coordinating services, and strengthening the communication among your providers. The  can assist you with financial, behavioral, psychosocial, chemical dependency, counseling, and/or psychiatric resources.    Please feel free to contact me at [phone], with any questions or concerns. We at [employer] [facility] are focused on providing you with the highest-quality healthcare experience possible and that all starts with you.     Sincerely,       Jing LANEN, RN, PHN  Care Coordination    [facility]  [address] [place] [address]  Office: [phone]  Fax [phone]   [facility]  [address] [place] [address]  Office: [phone] Fax  [phone]  [email or web address]   [email or web address]   Connect with [employer] on social media.

## 2018-05-09 NOTE — PROGRESS NOTES
Clinic Care Coordination Contact  [facility]/Voicemail       Clinical Data: Care Coordinator Outreach  Outreach attempted x 2.  Left message on Mothers voicemail with call back information and requested return call. Unable to leave message on pt's vm is full.   Plan: Care Coordinator will try to reach patient again in 1-2 business days.      Jing HANNA, RN, PHN  Care Coordination    [facility]  [address] [place] [address]  Office: [phone]  Fax [phone]   [facility]  [address] [place] [address]  Office: [phone] Fax [phone]  [email or web address]   [email or web address]   Connect with [facility] on social media.

## 2018-05-15 NOTE — PROGRESS NOTES
Clinic Care Coordination Contact  Care Team Conversations    [facility] letter was sent to patient.  Will outreach again in 2-5 business days.     Jing HANNA, RN, PHN  Care Coordination    [facility]  [address] [place] [address]  Office: [phone]  Fax [phone]   [facility]  [address] [place] [address]  Office: [phone] Fax [phone]  [email or web address]   [email or web address]   Connect with [facility] on social media.

## 2018-05-16 ENCOUNTER — THERAPY VISIT (OUTPATIENT)
Dept: PHYSICAL THERAPY | Facility: CLINIC | Age: 23
End: 2018-05-16
Payer: COMMERCIAL

## 2018-05-16 DIAGNOSIS — G60.0 CHARCOT-MARIE DISEASE: ICD-10-CM

## 2018-05-16 DIAGNOSIS — R26.2 DIFFICULTY WALKING: ICD-10-CM

## 2018-05-16 DIAGNOSIS — R29.898 ANKLE WEAKNESS: Primary | ICD-10-CM

## 2018-05-16 PROCEDURE — 97161 PT EVAL LOW COMPLEX 20 MIN: CPT | Mod: GP | Performed by: PHYSICAL THERAPIST

## 2018-05-16 PROCEDURE — 97110 THERAPEUTIC EXERCISES: CPT | Mod: GP | Performed by: PHYSICAL THERAPIST

## 2018-05-16 ASSESSMENT — ACTIVITIES OF DAILY LIVING (ADL)
HIGHEST_POTENTIAL_ADL_SCORE:: 84
ACTIVITIES_OF_DAILY_LIVING_MEASURE_SCORE(%):: 66.67
TOTAL_ADL_ITEM_SCORE:: 56

## 2018-05-16 NOTE — PROGRESS NOTES
[facility] Initial Evaluation  Subjective:  Patient is a 22 year old female presenting with rehab right ankle/foot hpi.   Donald Duarte is a 22 year old female with a bilateral ankles condition.  Occurance: Charcot Marysol tooth- disease, Having pain from rolling ankle to alieve tension and just weakness because she is relying on brace more.  Condition occurred: for unknown reasons.  This is a chronic condition  5/8/18 Date of MD order.    Patient reports pain:  Lateral (below lateral maleoli).  Radiates to:  No radiation.  Pain is described as sharp and is intermittent and reported as 6/10.  Associated symptoms:  Loss of strength. Pain is worse in the P.M..  Symptoms are exacerbated by walking (every time she steps down it feels like bones are hitting each other, ) Relieved by: will roll her ankle and has tried some lidocaine cream and brace,   Since onset symptoms are gradually worsening.  Special tests:  X-ray (no signficant findings).      General health as reported by patient is good.  Pertinent medical history includes:  Heart problems (tachycardia).  Medical allergies: no.  Other surgeries include:  None reported.  Current medications:  None as reported by patient.  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  Patient is working in normal job without restrictions.  Primary job tasks include:  Prolonged standing and lifting (patient care).    Barriers include:  None as reported by patient.    Red flags:  None as reported by patient.                        Objective:    Gait:  Hip hiking for toe clearance do to weak DF    Assistive Devices:  Brace            Ankle/Foot Evaluation  ROM:    AROM:    Dorsiflexion:  Left:   Lacking 38 deg do to weakness  Right:   Lacking 40 deg to to weakness  Plantarflexion:  Left:  68    Right:  68  Inversion:  Left:  36     Right:  29  Eversion:  5     Right:  8      PROM:    Dorsiflexion:  Left:    2     Right:   Lacking 4 deg                  Strength:    Dorsiflexion:  Left: 3-/5     Pain:   Right: 2-/5   Pain:  Plantarflexion: Right: 3/5  Pain:  Inversion:Left: 3/5  Pain:     Right: 3/5  Pain:  Eversion:Left: 3/5  Pain:  Right: 3/5  Pain:  Flexion Great Toe:Left: 2/5  Pain:  Right: 2/5   Pain:  Extension Great Toe:Right: 1+/5  Pain:  Anterior Tibialis:Left: 1+/5  Pain:              LIGAMENT TESTING: normal                PALPATION: normal      MOBILITY TESTING:     Tib-Fib Distal Left: hypermobile    Tib-Fib Distal Right: hypermobile  Talocrural Left: hypermobile    Talocrural Right: hypermobile  Subtalar Left: hypermobile    Subtalar Right: hypermobile  Midtarsal Left: hypermobile    Midtarsal Right: hypermobile    FUNCTIONAL TESTS: Functional test ankle: SLS unable greater than 3 sec biltarerally.                                                              General     ROS    Assessment/Plan:    Patient is a 22 year old female with both sides ankle complaints.    Patient has the following significant findings with corresponding treatment plan.                Diagnosis 1:  Bilateral ankle / foot weakness / difficulty walking  Pain -  hot/cold therapy, electric stimulation, manual therapy, self management, education, directional preference exercise and home program  Decreased ROM/flexibility - manual therapy, therapeutic exercise, therapeutic activity and home program  Decreased strength - therapeutic exercise, therapeutic activities and home program  Impaired balance - neuro re-education, gait training, therapeutic activities and home program  Impaired gait - gait training and home program  Decreased function - therapeutic activities and home program  Instability -  Therapeutic Activity  Therapeutic Exercise  Neuromuscular Re-education  home program    Therapy Evaluation Codes:   1) History comprised of:   Personal factors that impact the plan of care:      None.    Comorbidity factors that impact the plan of care are:      Weakness.      Medications impacting care: None.  2) Examination of Body Systems comprised of:   Body structures and functions that impact the plan of care:      Ankle.   Activity limitations that impact the plan of care are:      Walking and balance.  3) Clinical presentation characteristics are:   Stable/Uncomplicated.  4) Decision-Making    Low complexity using standardized patient assessment instrument and/or measureable assessment of functional outcome.  Cumulative Therapy Evaluation is: Low complexity.    Previous and current functional limitations:  (See Goal Flow Sheet for this information)    Short term and Long term goals: (See Goal Flow Sheet for this information)     Communication ability:  Patient appears to be able to clearly communicate and understand verbal and written communication and follow directions correctly.  Treatment Explanation - The following has been discussed with the patient:   RX ordered/plan of care  Anticipated outcomes  Possible risks and side effects  This patient would benefit from PT intervention to resume normal activities.   Rehab potential is good.    Frequency:  1 X week, once daily  Duration:  for 6 weeks  Discharge Plan:  Achieve all LTG.  Independent in home treatment program.  Reach maximal therapeutic benefit.    Please refer to the daily flowsheet for treatment today, total treatment time and time spent performing 1:1 timed codes.

## 2018-05-16 NOTE — MR AVS SNAPSHOT
After Visit Summary   5/16/2018    Donald Duarte    MRN: [id number]           Patient Information     Date Of Birth          1995        Visit Information        Provider Department      5/16/2018 8:10 AM Fracisco Multani, PT [facility] Physical [facility]        Today's Diagnoses     Ankle weakness    -  1    Charcot-Marysol disease        Difficulty walking           Follow-ups after your visit        Your next 10 appointments already scheduled     May 23, 2018 12:10 PM CDT   WIL Extremity with Fracisco Multani, PT   [facility] Athletic [facility] Physical [facility] ([facility]  )    [address]  [facility] [address]   [phone]            May 31, 2018  8:00 AM CDT   WIL Extremity with Amanda K Hilligoss, PT   [facility] ([facility]  )    [address]  [facility] [address]   [phone]            Jun 06, 2018 10:10 AM CDT   WIL Extremity with Fracisco Multani, PT   [facility] ([facility]  )    [address]  [facility] [address]   [phone]              Who to contact     If you have questions or need follow up information about today's clinic visit or your schedule please contact [facility] PHYSICAL THERAPY directly at [phone].  Normal or non-critical lab and imaging results will be communicated to you by MyChart, letter or phone within 4 business days after the clinic has received the results. If you do not hear from us within 7 days, please contact the clinic through MyChart or phone. If you have a critical or abnormal lab result, we will notify you by phone as soon as possible.  Submit refill requests through [employer] or call your pharmacy and they will forward the refill request to us. Please allow 3 business days for your refill to be completed.           Additional Information About Your Visit        Care EveryWhere ID     This is your Care EveryWhere ID. This could be used by other organizations to access your [facility] medical records  [id number]         Blood Pressure from Last 3 Encounters:   05/08/18 116/68   04/27/18 118/70   01/31/18 108/68    Weight from Last 3 Encounters:   05/08/18 62.6 kg (138 lb)   04/27/18 61.5 kg (135 lb 9.6 oz)   01/31/18 63 kg (139 lb)              We Performed the Following     HC PT EVAL, LOW COMPLEXITY     WIL INITIAL EVAL REPORT     THERAPEUTIC EXERCISES        Primary Care Provider Office Phone # Fax #    Nellie Wang, APRN [phone]       [address] [address]        Equal Access to Services     BRANDAN BARNHART : Hadii artis almaguer hadasho Soomaali, waaxda luqadaha, qaybta kaalmada adeegyada, delio braswell . So [facility] [phone].    ATENCIÓN: Si habla español, tiene a rosario disposición servicios gratuitos de asistencia lingüística. Jignesh al [phone].    We comply with applicable federal civil rights laws and [place] laws. We do not discriminate on the basis of race, color, national origin, age, disability, sex, sexual orientation, or gender identity.            Thank you!     Thank you for choosing [facility]ER[facility]  for your care. Our goal is always to provide you with excellent care. Hearing back from our patients is one way we can continue to improve our services. Please take a few minutes to complete the written survey that you may receive in the mail after your visit with us. Thank you!             Your Updated Medication List - Protect others around you: Learn how to safely use, store and throw away your medicines at www.disposemymeds.org.          This list is accurate as of 5/16/18 10:45 AM.  Always use your most recent med list.                   Brand Name Dispense Instructions for use Diagnosis     * amphetamine-dextroamphetamine 20 MG per 24 hr capsule    ADDERALL XR    30 capsule    Take 1 capsule (20 mg) by mouth daily    Attention deficit hyperactivity disorder (ADHD), combined type       * amphetamine-dextroamphetamine 20 MG per 24 hr capsule   Start taking on:  5/28/2018    ADDERALL XR    30 capsule    Take 1 capsule (20 mg) by mouth daily    Attention deficit hyperactivity disorder (ADHD), combined type       * amphetamine-dextroamphetamine 20 MG per 24 hr capsule   Start taking on:  6/28/2018    ADDERALL XR    30 capsule    Take 1 capsule (20 mg) by mouth daily    Attention deficit hyperactivity disorder (ADHD), combined type       levonorgestrel 20 MCG/24HR IUD    MIRENA     1 each (20 mcg) by Intrauterine route continuous    Encounter for IUD insertion, Presence of intrauterine contraceptive device       loratadine 10 MG tablet    CLARITIN    30 tablet    Take 1 tablet (10 mg) by mouth daily    Chronic seasonal allergic rhinitis, unspecified trigger       Phenylephrine-Polyvinyl Alc 0.12-1.4 % Soln    REFRESH REDNESS RELIEF    1 Bottle    Apply 1 drop to eye 2 times daily as needed    Chronic seasonal allergic rhinitis, unspecified trigger       * Notice:  This list has 3 medication(s) that are the same as other medications prescribed for you. Read the directions carefully, and ask your doctor or other care provider to review them with you.

## 2018-05-16 NOTE — LETTER
[facility] ATHLETIC [facility] PHYSICAL [facility]  [address]  [facility] [id number] [phone]    May 16, 2018    Re: Donald Duarte   :   1995  MRN:  [id number]   REFERRING PHYSICIAN:   David Caba    [facility] ATHLETIC [facility]    Date of Initial Evaluation:  ***  Visits:  Rxs Used: 1  Reason for Referral:     Ankle weakness  Charcot-Marysol disease  Difficulty walking    EVALUATION SUMMARY    [facility] Athletic [facility] Initial Evaluation  Subjective:  Patient is a 22 year old female presenting with rehab right ankle/foot hpi.   Donald Duarte is a 22 year old female with a bilateral ankles condition.  Occurance: Charcot Marysol tooth- disease, Having pain from rolling ankle to alieve tension and just weakness because she is relying on brace more.  Condition occurred: for unknown reasons.  This is a chronic condition  18 Date of MD order.    Patient reports pain:  Lateral (below lateral maleoli).  Radiates to:  No radiation.  Pain is described as sharp and is intermittent and reported as 6/10.  Associated symptoms:  Loss of strength. Pain is worse in the P.M..  Symptoms are exacerbated by walking (every time she steps down it feels like bones are hitting each other, ) Relieved by: will roll her ankle and has tried some lidocaine cream and brace,   Since onset symptoms are gradually worsening.  Special tests:  X-ray (no signficant findings).      General health as reported by patient is good.  Pertinent medical history includes:  Heart problems (tachycardia).  Medical allergies: no.  Other surgeries include:  None reported.  Current medications:  None as reported by patient.  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  Patient is working in normal job without restrictions.  Primary job tasks include:  Prolonged standing and lifting (patient care).    Barriers include:  None as reported by patient.    Red  flags:  None as reported by patient.                        Objective:    Gait:  Hip hiking for toe clearance do to weak DF    Assistive Devices:  Brace            Ankle/Foot Evaluation  ROM:    AROM:    Dorsiflexion:  Left:   Lacking 38 deg do to weakness  Right:   Lacking 40 deg to to weakness  Plantarflexion:  Left:  68    Right:  68  Inversion:  Left:  36     Right:  29  Eversion:  5     Right:  8      PROM:    Dorsiflexion:  Left:    2     Right:   Lacking 4 deg                 Strength:    Dorsiflexion:  Left: 3-/5     Pain:   Right: 2-/5   Pain:  Plantarflexion: Right: 3/5  Pain:  Inversion:Left: 3/5  Pain:     Right: 3/5  Pain:  Eversion:Left: 3/5  Pain:  Right: 3/5  Pain:  Flexion Great Toe:Left: 2/5  Pain:  Right: 2/5   Pain:  Extension Great Toe:Right: 1+/5  Pain:  Anterior Tibialis:Left: 1+/5  Pain:              LIGAMENT TESTING: normal                PALPATION: normal      MOBILITY TESTING:     Tib-Fib Distal Left: hypermobile    Tib-Fib Distal Right: hypermobile  Talocrural Left: hypermobile    Talocrural Right: hypermobile  Subtalar Left: hypermobile    Subtalar Right: hypermobile  Midtarsal Left: hypermobile    Midtarsal Right: hypermobile    FUNCTIONAL TESTS: Functional test ankle: SLS unable greater than 3 sec biltarerally.                                                              General     ROS    Assessment/Plan:    Patient is a 22 year old female with both sides ankle complaints.    Patient has the following significant findings with corresponding treatment plan.                Diagnosis 1:  Bilateral ankle / foot weakness / difficulty walking  Pain -  hot/cold therapy, electric stimulation, manual therapy, self management, education, directional preference exercise and home program  Decreased ROM/flexibility - manual therapy, therapeutic exercise, therapeutic activity and home program  Decreased strength - therapeutic exercise, therapeutic activities and home program  Impaired balance -  neuro re-education, gait training, therapeutic activities and home program  Impaired gait - gait training and home program  Decreased function - therapeutic activities and home program  Instability -  Therapeutic Activity  Therapeutic Exercise  Neuromuscular Re-education  home program    Therapy Evaluation Codes:   1) History comprised of:   Personal factors that impact the plan of care:      None.    Comorbidity factors that impact the plan of care are:      Weakness.     Medications impacting care: None.  2) Examination of Body Systems comprised of:   Body structures and functions that impact the plan of care:      Ankle.   Activity limitations that impact the plan of care are:      Walking and balance.  3) Clinical presentation characteristics are:   Stable/Uncomplicated.  4) Decision-Making    Low complexity using standardized patient assessment instrument and/or measureable assessment of functional outcome.  Cumulative Therapy Evaluation is: Low complexity.    Previous and current functional limitations:  (See Goal Flow Sheet for this information)    Short term and Long term goals: (See Goal Flow Sheet for this information)     Communication ability:  Patient appears to be able to clearly communicate and understand verbal and written communication and follow directions correctly.  Treatment Explanation - The following has been discussed with the patient:   RX ordered/plan of care  Anticipated outcomes  Possible risks and side effects  This patient would benefit from PT intervention to resume normal activities.   Rehab potential is good.    Frequency:  1 X week, once daily  Duration:  for 6 weeks  Discharge Plan:  Achieve all LTG.  Independent in home treatment program.  Reach maximal therapeutic benefit.    Please refer to the daily flowsheet for treatment today, total treatment time and time spent performing 1:1 timed codes.         Thank you for your referral.    INQUIRIES  Therapist:    [facility]  ATHLETIC MEDICINE - [place] PHYSICAL THERAPY  [address]  [facility] [address]  Phone: [phone]  Fax: [phone]

## 2018-05-18 NOTE — PROGRESS NOTES
Clinic Care Coordination Contact    Pt called back and left a message she is interested in going to the [facility] clinic.  The referral was sent via [employer] and writer called the [facility] clinic and they will watch for the referral and give her a call next week to set something up.  Writer did try and contact pt again today however, she did not answer but left her a message in regards to the above. Writer will follow up in 1 week to make sure she is scheduled.      [facility]/Voicemail    Referral Source: Specialist  Clinical Data: Care Coordinator Outreach  Outreach attempted x 1.  Left message on voicemail with call back information and requested return call.  Plan: Care Coordinator will try to reach patient again in 3-5 business days.          Jing HANNA, RN, PHN  Care Coordination    [facility]  [address] [place] [address]  Office: [phone]  Fax [phone]   [facility]  [address] [place] [address]  Office: [phone] Fax [phone]  [email or web address]   [email or web address]   Connect with [facility] on social media.

## 2018-05-23 ENCOUNTER — THERAPY VISIT (OUTPATIENT)
Dept: PHYSICAL THERAPY | Facility: CLINIC | Age: 23
End: 2018-05-23
Payer: COMMERCIAL

## 2018-05-23 DIAGNOSIS — G60.0 CHARCOT-MARIE DISEASE: ICD-10-CM

## 2018-05-23 DIAGNOSIS — R29.898 ANKLE WEAKNESS: ICD-10-CM

## 2018-05-23 DIAGNOSIS — R26.2 DIFFICULTY WALKING: ICD-10-CM

## 2018-05-23 PROCEDURE — 97110 THERAPEUTIC EXERCISES: CPT | Mod: GP | Performed by: PHYSICAL THERAPIST

## 2018-05-23 PROCEDURE — 97112 NEUROMUSCULAR REEDUCATION: CPT | Mod: GP | Performed by: PHYSICAL THERAPIST

## 2018-05-25 ENCOUNTER — PATIENT OUTREACH (OUTPATIENT)
Dept: CARE COORDINATION | Facility: CLINIC | Age: 23
End: 2018-05-25

## 2018-05-25 NOTE — PROGRESS NOTES
Clinic Care Coordination Contact  Care Team Conversations    Called the [facility] clinic to follow up on referral as it looks like pt has not been contacted yet for an appt.  Spoke with a jada to will get a message to the [facility] schedulers to call the patient.  For some reason nothing has been done with the referral so far.      Plan:  RN will follow up in 2-4 business days to ensure pt has an appt.     Jing HANNA, RN, PHN  Care Coordination    [facility]  [address] [place] [address]  Office: [phone]  Fax [phone]   [facility]  [address] [place] [address]  Office: [phone] Fax [phone]  [email or web address]   [email or web address]   Connect with [facility] on social media.

## 2018-05-29 ENCOUNTER — TELEPHONE (OUTPATIENT)
Dept: FAMILY MEDICINE | Facility: OTHER | Age: 23
End: 2018-05-29

## 2018-05-29 DIAGNOSIS — G60.0 CHARCOT-MARIE DISEASE: Primary | ICD-10-CM

## 2018-05-29 NOTE — TELEPHONE ENCOUNTER
Our goal is to have forms completed with 72 hours, however some forms may require a visit or additional information.    Who is the form from?: School (if other please explain)  Where the form came from: Patient or family brought in     What clinic location was the form placed at?: [place]  Where the form was placed: 's Box  What number is listed as a contact on the form?: [phone]    Phone call message- patient request for a letter, form or note:    Date needed: as soon as possible  Please call the patient when completed  Has the patient signed a consent form for release of information? NO    Additional comments:     Call taken on 5/29/2018 at 3:19 PM by Odessa Preez    Type of letter, form or note: medical

## 2018-05-31 NOTE — TELEPHONE ENCOUNTER
I am not sure what she is expecting on this form.  She has no permanent limitations and no specific limitations however she may have fatigue from time to time and she will require appointments to manage and understand how to better manage her CMT.  This was explained on the form.  If she request something else it would be best to either follow-up in clinic or have any permanent limitations managed with providers at the CMT clinic.  It is very important that she follows up with CMT clinic for appropriate lifelong support.

## 2018-06-05 NOTE — TELEPHONE ENCOUNTER
2nd message left for patient to see where she would life this form sent. I sent a copy to scanning and mailed her a copy as well.

## 2018-06-06 ENCOUNTER — THERAPY VISIT (OUTPATIENT)
Dept: PHYSICAL THERAPY | Facility: CLINIC | Age: 23
End: 2018-06-06
Payer: COMMERCIAL

## 2018-06-06 DIAGNOSIS — R26.2 DIFFICULTY WALKING: ICD-10-CM

## 2018-06-06 DIAGNOSIS — G60.0 CHARCOT-MARIE DISEASE: ICD-10-CM

## 2018-06-06 DIAGNOSIS — R29.898 ANKLE WEAKNESS: ICD-10-CM

## 2018-06-06 PROCEDURE — 97110 THERAPEUTIC EXERCISES: CPT | Mod: GP | Performed by: PHYSICAL THERAPIST

## 2018-06-06 PROCEDURE — 97112 NEUROMUSCULAR REEDUCATION: CPT | Mod: GP | Performed by: PHYSICAL THERAPIST

## 2018-06-13 ENCOUNTER — THERAPY VISIT (OUTPATIENT)
Dept: PHYSICAL THERAPY | Facility: CLINIC | Age: 23
End: 2018-06-13
Payer: COMMERCIAL

## 2018-06-13 DIAGNOSIS — R26.2 DIFFICULTY WALKING: ICD-10-CM

## 2018-06-13 DIAGNOSIS — R29.898 ANKLE WEAKNESS: ICD-10-CM

## 2018-06-13 DIAGNOSIS — G60.0 CHARCOT-MARIE DISEASE: ICD-10-CM

## 2018-06-13 PROCEDURE — 97110 THERAPEUTIC EXERCISES: CPT | Mod: GP | Performed by: PHYSICAL THERAPIST

## 2018-06-13 NOTE — PROGRESS NOTES
Subjective:  HPI                    Objective:  System    Physical Exam    General     ROS    Assessment/Plan:    DISCHARGE REPORT    Progress reporting period is from 5/16/18 to 6/13/18.       SUBJECTIVE  Patient reports she got sore from the exercises but nothing that was intolerable. Did work a double shift yesterday with brace and did get some anterior lateral ankle pain    Current Pain level: 0/10.     Initial Pain level: 6/10.   Changes in function:  Yes (See Goal flowsheet attached for changes in current functional level)  Adverse reaction to treatment or activity: None    OBJECTIVE  ankle DF 3-/5 on left, 2-/5 on right. left inversion 3-/5, eversion 3-/5, PF 4/5, right inversion 2+/5, eversion 2+/5, PF 4-/5.      ASSESSMENT/PLAN  Updated problem list and treatment plan: Diagnosis 1:  Bilateral ankle / foot weakness / difficulty walking  Decreased strength - home program  Impaired gait - home program  Impaired muscle performance - home program  STG/LTGs have been met or progress has been made towards goals:  Yes (See Goal flow sheet completed today.)  Assessment of Progress: The patient's condition is improving.  Self Management Plans:  Patient is independent in a home treatment program.  I have re-evaluated this patient and find that the nature, scope, duration and intensity of the therapy is appropriate for the medical condition of the patient.  Donald continues to require the following intervention to meet STG and LTG's:  PT HEP.     Recommendations:  With charcot Marysol tooth disease it is expected patient will need to continue with HEP independently for extended period of time. Patient educated if question or concerns with current program to please contact Dr. Caba or Physical Therapy for help. This patient is ready to be discharged from therapy and continue their home treatment program.    Please refer to the daily flowsheet for treatment today, total treatment time and time spent performing 1:1 timed  codes.

## 2018-06-13 NOTE — LETTER
[facility] ATHLETIC [facility] PHYSICAL [facility]  [address]  [facility] [id number] [phone]    2018    Re: Donald Duarte   :   1995  MRN:  [id number]   REFERRING PHYSICIAN:   David Caba    [facility] ATHLETIC [facility]    Date of Initial Evaluation:  ***  Visits:  Rxs Used: 4  Reason for Referral:     Ankle weakness  Difficulty walking  Charcot-Marysol disease    EVALUATION SUMMARY    Subjective:  HPI                    Objective:  System    Physical Exam    General     ROS    Assessment/Plan:    DISCHARGE REPORT    Progress reporting period is from 18 to 18.       SUBJECTIVE  Patient reports she got sore from the exercises but nothing that was intolerable. Did work a double shift yesterday with brace and did get some anterior lateral ankle pain    Current Pain level: 0/10.     Initial Pain level: 6/10.   Changes in function:  Yes (See Goal flowsheet attached for changes in current functional level)  Adverse reaction to treatment or activity: None    OBJECTIVE  ankle DF 3-/5 on left, 2-/5 on right. left inversion 3-/5, eversion 3-/5, PF 4/5, right inversion 2+/5, eversion 2+/5, PF 4-/5.      ASSESSMENT/PLAN  Updated problem list and treatment plan: Diagnosis 1:  Bilateral ankle / foot weakness / difficulty walking  Decreased strength - home program  Impaired gait - home program  Impaired muscle performance - home program  STG/LTGs have been met or progress has been made towards goals:  Yes (See Goal flow sheet completed today.)  Assessment of Progress: The patient's condition is improving.  Self Management Plans:  Patient is independent in a home treatment program.  I have re-evaluated this patient and find that the nature, scope, duration and intensity of the therapy is appropriate for the medical condition of the patient.  Donald continues to require the following intervention to meet STG and LTG's:  PT HEP.      Recommendations:  With charcot Marysol tooth disease it is expected patient will need to continue with HEP independently for extended period of time. Patient educated if question or concerns with current program to please contact Dr. Caba or Physical Therapy for help. This patient is ready to be discharged from therapy and continue their home treatment program.          Thank you for your referral.    INQUIRIES  Therapist:   [facility]  [address]  [facility] [address]  Phone: [phone]  Fax: [phone]

## 2018-06-13 NOTE — MR AVS SNAPSHOT
After Visit Summary   6/13/2018    Donald Duarte    MRN: [id number]           Patient Information     Date Of Birth          1995        Visit Information        Provider Department      6/13/2018 8:50 AM Fracisco Multani PT [facility]        Today's Diagnoses     Ankle weakness        Difficulty walking        Charcot-Marysol disease           Follow-ups after your visit        Who to contact     If you have questions or need follow up information about today's clinic visit or your schedule please contact [facility] ATHLETIC [facility] directly at [phone].  Normal or non-critical lab and imaging results will be communicated to you by MyChart, letter or phone within 4 business days after the clinic has received the results. If you do not hear from us within 7 days, please contact the clinic through MyChart or phone. If you have a critical or abnormal lab result, we will notify you by phone as soon as possible.  Submit refill requests through [employer] or call your pharmacy and they will forward the refill request to us. Please allow 3 business days for your refill to be completed.          Additional Information About Your Visit        Care EveryWhere ID     This is your Care EveryWhere ID. This could be used by other organizations to access your [place] medical records  [id number]         Blood Pressure from Last 3 Encounters:   05/08/18 116/68   04/27/18 118/70   01/31/18 108/68    Weight from Last 3 Encounters:   05/08/18 62.6 kg (138 lb)   04/27/18 61.5 kg (135 lb 9.6 oz)   01/31/18 63 kg (139 lb)              We Performed the Following     WIL PROGRESS NOTES REPORT     THERAPEUTIC EXERCISES        Primary Care Provider Office Phone # Fax #    Nellie JAMES Fernandez [phone]       [address] [facility] [address]  [facility] [address]        Equal Access to Services     BRANDAN BARNHART AH:  Hadii artis stanleyo Soselinali, waaxda luqadaha, qaybta kaalmada darren, deilo roin hayaakody gonzalezlaura deleon lahowardkody akilah. So [facility] [phone].    ATENCIÓN: Si paula salazar, tiene a rosario disposición servicios gratuitos de asistencia lingüística. Llame al [phone].    We comply with applicable federal civil rights laws and [place] laws. We do not discriminate on the basis of race, color, national origin, age, disability, sex, sexual orientation, or gender identity.            Thank you!     Thank you for choosing [facility] FOR ATHLETIC MEDICINE [facility] PHYSICAL [facility]  for your care. Our goal is always to provide you with excellent care. Hearing back from our patients is one way we can continue to improve our services. Please take a few minutes to complete the written survey that you may receive in the mail after your visit with us. Thank you!             Your Updated Medication List - Protect others around you: Learn how to safely use, store and throw away your medicines at www.disposemymeds.org.          This list is accurate as of 6/13/18  1:48 PM.  Always use your most recent med list.                   Brand Name Dispense Instructions for use Diagnosis    * amphetamine-dextroamphetamine 20 MG per 24 hr capsule    ADDERALL XR    30 capsule    Take 1 capsule (20 mg) by mouth daily    Attention deficit hyperactivity disorder (ADHD), combined type       * amphetamine-dextroamphetamine 20 MG per 24 hr capsule   Start taking on:  6/28/2018    ADDERALL XR    30 capsule    Take 1 capsule (20 mg) by mouth daily    Attention deficit hyperactivity disorder (ADHD), combined type       levonorgestrel 20 MCG/24HR IUD    MIRENA     1 each (20 mcg) by Intrauterine route continuous    Encounter for IUD insertion, Presence of intrauterine contraceptive device       loratadine 10 MG tablet    CLARITIN    30 tablet    Take 1 tablet (10 mg) by mouth daily    Chronic seasonal allergic rhinitis, unspecified trigger        Phenylephrine-Polyvinyl Alc 0.12-1.4 % Soln    REFRESH REDNESS RELIEF    1 Bottle    Apply 1 drop to eye 2 times daily as needed    Chronic seasonal allergic rhinitis, unspecified trigger       * Notice:  This list has 2 medication(s) that are the same as other medications prescribed for you. Read the directions carefully, and ask your doctor or other care provider to review them with you.

## 2018-07-16 ENCOUNTER — PATIENT OUTREACH (OUTPATIENT)
Dept: CARE COORDINATION | Facility: CLINIC | Age: 23
End: 2018-07-16

## 2018-07-16 NOTE — LETTER
July 27, 2018      Donald Duarte  [address] [facility] [address]        Dear Donald,    I have been trying to reach you to follow up on helping you get scheduled for the CMT clinic as recommened by Dr. Caba.  Currently there is no information in your chart that indicates you have scheduled an appt.  If you would like to continue moving forward with an appt please call and I can assist you further.           Sincerely,      Jing HANNA, RN, PHN  Care Coordination    [facility]  [address] [place] [address]  Office: [phone]  Fax [phone]   [facility]  [address] [place] [address]  Office: [phone] Fax [phone]  [email or web address]   [email or web address]   Connect with [facility] on social media.

## 2018-07-16 NOTE — PROGRESS NOTES
Clinic Care Coordination Contact  [facility]/Voicemail    Clinical Data: Care Coordinator Outreach  Outreach attempted x 1.  Left message on voicemail with call back information and requested return call.  Plan: Care Coordinator will try to reach patient again in 1-2 business days.      Jing HANNA, RN, PHN  Care Coordination    [facility]  [address] [place] [address]  Office: [phone]  Fax [phone]   [facility]  [address] [place] [address]  Office: [phone] Fax [phone]  [email or web address]   [email or web address]   Connect with [facility] on social media.

## 2018-07-25 NOTE — PROGRESS NOTES
Clinic Care Coordination Contact  [facility]/Voicemail    Referral Source: Specialist  Clinical Data: Care Coordinator Outreach  Outreach attempted x 2.  Left message on voicemail with call back information and requested return call.  Plan:  Care Coordinator will try to reach patient again in 3-5 business days.      Jing HANNA, RN, PHN  Care Coordination    [facility] [place] [facility]  [address] [place] [address]  Office: [phone]  Fax [phone]   [facility]  [address] [place] [address]  Office: [phone] Fax [phone]  [email or web address]   [email or web address]   Connect with [facility] on social media.

## 2018-07-27 NOTE — PROGRESS NOTES
Clinic Care Coordination Contact  [facility]/Voicemail    Referral Source: Specialist  Clinical Data: Care Coordinator Outreach  Plan: Care Coordinator mailed out [facility] letter. Care Coordinator will do no further outreaches at this time.      Jing HANNA, RN, PHN  Care Coordination    [facility]  [address] [place] [address]  Office: [phone]  Fax [phone]   [facility]  [address] [place] [address]  Office: [phone] Fax [phone]  [email or web address]   [email or web address]   Connect with [facility] Services on social media.

## 2018-08-29 ENCOUNTER — TELEPHONE (OUTPATIENT)
Dept: LAB | Facility: OTHER | Age: 23
End: 2018-08-29

## 2018-08-29 DIAGNOSIS — F90.2 ATTENTION DEFICIT HYPERACTIVITY DISORDER (ADHD), COMBINED TYPE: ICD-10-CM

## 2018-08-29 NOTE — TELEPHONE ENCOUNTER
[facility] IN [place] IS REQUESTING A REFILL FOR ADDERALL XR ORAL CAPSULE 20 MG

## 2018-08-29 NOTE — TELEPHONE ENCOUNTER
amphetamine-dextroamphetamine (ADDERALL XR) 20 MG per 24 hr capsule  Routing refill request to provider for review/approval because:  Drug not on the [facility] refill protocol   Génesis Joyner RN, BSN

## 2018-08-31 RX ORDER — DEXTROAMPHETAMINE SACCHARATE, AMPHETAMINE ASPARTATE MONOHYDRATE, DEXTROAMPHETAMINE SULFATE AND AMPHETAMINE SULFATE 5; 5; 5; 5 MG/1; MG/1; MG/1; MG/1
20 CAPSULE, EXTENDED RELEASE ORAL DAILY
Qty: 30 CAPSULE | Refills: 0 | Status: SHIPPED | OUTPATIENT
Start: 2018-08-31 | End: 2018-10-16

## 2018-08-31 NOTE — TELEPHONE ENCOUNTER
Patient called checking on the status of her medication, Adderall.   She is out, can this be done today?

## 2018-10-16 DIAGNOSIS — F90.2 ATTENTION DEFICIT HYPERACTIVITY DISORDER (ADHD), COMBINED TYPE: ICD-10-CM

## 2018-10-17 RX ORDER — DEXTROAMPHETAMINE SACCHARATE, AMPHETAMINE ASPARTATE MONOHYDRATE, DEXTROAMPHETAMINE SULFATE AND AMPHETAMINE SULFATE 5; 5; 5; 5 MG/1; MG/1; MG/1; MG/1
20 CAPSULE, EXTENDED RELEASE ORAL DAILY
Qty: 30 CAPSULE | Refills: 0 | Status: SHIPPED | OUTPATIENT
Start: 2018-10-17 | End: 2019-03-20

## 2018-10-17 NOTE — TELEPHONE ENCOUNTER
Attempted call. Voicemail is full.     Rx in [place] to do basket for [place]. Please see if pt wants to pick it up or wants to have it mailed to [facility] in [place]

## 2018-10-17 NOTE — TELEPHONE ENCOUNTER
Refill given for one month will need to schedule an appointment for follow up in office for ADHD please help schedule    Thank you  Nellie Wang CNP

## 2018-10-22 ENCOUNTER — ALLIED HEALTH/NURSE VISIT (OUTPATIENT)
Dept: FAMILY MEDICINE | Facility: OTHER | Age: 23
End: 2018-10-22
Payer: OTHER MISCELLANEOUS

## 2018-10-22 ENCOUNTER — OFFICE VISIT (OUTPATIENT)
Dept: FAMILY MEDICINE | Facility: OTHER | Age: 23
End: 2018-10-22
Payer: OTHER MISCELLANEOUS

## 2018-10-22 VITALS
TEMPERATURE: 98.8 F | RESPIRATION RATE: 16 BRPM | SYSTOLIC BLOOD PRESSURE: 116 MMHG | HEART RATE: 78 BPM | DIASTOLIC BLOOD PRESSURE: 72 MMHG

## 2018-10-22 DIAGNOSIS — Z23 NEED FOR PROPHYLACTIC VACCINATION AND INOCULATION AGAINST INFLUENZA: Primary | ICD-10-CM

## 2018-10-22 DIAGNOSIS — S92.355A CLOSED NONDISPLACED FRACTURE OF FIFTH METATARSAL BONE OF LEFT FOOT, INITIAL ENCOUNTER: Primary | ICD-10-CM

## 2018-10-22 PROCEDURE — 99214 OFFICE O/P EST MOD 30 MIN: CPT | Performed by: NURSE PRACTITIONER

## 2018-10-22 PROCEDURE — 90471 IMMUNIZATION ADMIN: CPT

## 2018-10-22 PROCEDURE — 90686 IIV4 VACC NO PRSV 0.5 ML IM: CPT

## 2018-10-22 PROCEDURE — 99207 ZZC NO CHARGE NURSE ONLY: CPT

## 2018-10-22 NOTE — MR AVS SNAPSHOT
After Visit Summary   10/22/2018    Donald Duarte    MRN: [id number]           Patient Information     Date Of Birth          1995        Visit Information        Provider Department      10/22/2018 3:50 PM Nellie Wang APRN CNP [facility]        Care Instructions    Recommend continue to wear cam boot during work and when ambulating. May do range of motion of ankle to help preserver muscle.     Return to clinic in 3 weeks for follow up.     Thank you  Nellie Wang CNP            Follow-ups after your visit        Who to contact     If you have questions or need follow up information about today's clinic visit or your schedule please contact [facility] directly at [phone].  Normal or non-critical lab and imaging results will be communicated to you by MyChart, letter or phone within 4 business days after the clinic has received the results. If you do not hear from us within 7 days, please contact the clinic through MyChart or phone. If you have a critical or abnormal lab result, we will notify you by phone as soon as possible.  Submit refill requests through [employer] or call your pharmacy and they will forward the refill request to us. Please allow 3 business days for your refill to be completed.          Additional Information About Your Visit        Care EveryWhere ID     This is your Care EveryWhere ID. This could be used by other organizations to access your [place] medical records  [id number]        Your Vitals Were     Pulse Temperature Respirations             78 98.8  F (37.1  C) (Temporal) 16          Blood Pressure from Last 3 Encounters:   10/22/18 116/72   05/08/18 116/68   04/27/18 118/70    Weight from Last 3 Encounters:   05/08/18 138 lb (62.6 kg)   04/27/18 135 lb 9.6 oz (61.5 kg)   01/31/18 139 lb (63 kg)              Today, you had the following     No orders found for display       Primary Care Provider Office Phone #  Fax #    Nellie Wang, JAMES MOSQUEDA [phone] [address] [place] [address]        Equal Access to Services     BRANDAN BARNHART : Hadii aad ku hadenochtania Soselinali, waaxda luqadaha, qaybta kaalmada adeyingda, delio morris carloslaura deleon michaelle isbell. So [facility] [phone].    ATENCIÓN: Si habla español, tiene a rosario disposición servicios gratuitos de asistencia lingüística. Llame al [phone].    We comply with applicable federal civil rights laws and [place] laws. We do not discriminate on the basis of race, color, national origin, age, disability, sex, sexual orientation, or gender identity.            Thank you!     Thank you for choosing [facility]  for your care. Our goal is always to provide you with excellent care. Hearing back from our patients is one way we can continue to improve our services. Please take a few minutes to complete the written survey that you may receive in the mail after your visit with us. Thank you!             Your Updated Medication List - Protect others around you: Learn how to safely use, store and throw away your medicines at www.disposemymeds.org.          This list is accurate as of 10/22/18  4:21 PM.  Always use your most recent med list.                   Brand Name Dispense Instructions for use Diagnosis    amphetamine-dextroamphetamine 20 MG per 24 hr capsule    ADDERALL XR    30 capsule    Take 1 capsule (20 mg) by mouth daily    Attention deficit hyperactivity disorder (ADHD), combined type       levonorgestrel 20 MCG/24HR IUD    MIRENA     1 each (20 mcg) by Intrauterine route continuous    Encounter for IUD insertion, Presence of intrauterine contraceptive device       loratadine 10 MG tablet    CLARITIN    30 tablet    Take 1 tablet (10 mg) by mouth daily    Chronic seasonal allergic rhinitis, unspecified trigger       Phenylephrine-Polyvinyl Alc 0.12-1.4 % Soln    REFRESH REDNESS RELIEF    1 Bottle    Apply 1 drop to eye 2  times daily as needed    Chronic seasonal allergic rhinitis, unspecified trigger

## 2018-10-22 NOTE — LETTER
[facility]  [address] [place] [address]  Phone: [phone]    October 22, 2018        Donald Duarte  [address] [facility] [address]          To whom it may concern:    RE: Donald Duarte    Patient may return to work with the following:May participate in some ambulation at work that does not include lifting.     Please contact me for questions or concerns.      Sincerely,        JAMES Rivas CNP

## 2018-10-22 NOTE — PROGRESS NOTES
SUBJECTIVE:   Donald Duarte is a 23 year old female who presents to clinic today for the following health issues:      HPI  WORK COMP    ED/UC Followup:  Facility:  [facility]   Date of visit: 10/19/18  Reason for visit: Rolled foot off a curb the day before.   Current Status: diagnosis broken foot, ankle is ok. Pt is in a boot. Foot is feeling better.      States returned to work today and was able to work an entire shift with seated work only. 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   She has history of Charcot devang disease and was recently released from PT (6/13/18) she is prone to weakness of ankles concerned for atrophy due to under using.   Problem list and histories reviewed & adjusted, as indicated.   There is a nondisplaced transverse intra-articular fracture through the base of the left fifth metatarsal that appears aligned. The MTP joints are preserved        Additional history: as documented    BP Readings from Last 3 Encounters:   10/22/18 116/72   05/08/18 116/68   04/27/18 118/70    Wt Readings from Last 3 Encounters:   05/08/18 138 lb (62.6 kg)   04/27/18 135 lb 9.6 oz (61.5 kg)   01/31/18 139 lb (63 kg)                  Labs reviewed in EPIC    ROS:  Constitutional, HEENT, cardiovascular, pulmonary, gi and gu systems are negative, except as otherwise noted.    OBJECTIVE:     /72  Pulse 78  Temp 98.8  F (37.1  C) (Temporal)  Resp 16  There is no height or weight on file to calculate BMI.  GENERAL: healthy, alert and no distress  MS: left foot lateral, toes and ankle ecchymosis ROM ankle intact, CMS intact. Mild inflammation. Denies pain       ASSESSMENT/PLAN:     1. Closed nondisplaced fracture of fifth metatarsal bone of left foot, initial encounter  She will continue to wear CAM boot with any ambulation. May remove at home and do active ROM of ankle to help  maintain strength.   Discussed risk of displacement is ambulates without boot. Letter given with restrictions for work.     Will return to clinic in 3 weeks and will re-xray at that time to check for healing.      Patient Instructions   Recommend continue to wear cam boot during work and when ambulating. May do range of motion of ankle to help preserver muscle.     Return to clinic in 3 weeks for follow up.     Thank you  Nellie Wang [facility]

## 2018-10-22 NOTE — PROGRESS NOTES

## 2018-10-22 NOTE — MR AVS SNAPSHOT
After Visit Summary   10/22/2018    Donald Duarte    MRN: [id number]           Patient Information     Date Of Birth          1995        Visit Information        Provider Department      10/22/2018 3:45 PM NL FLOAT NURSE [facility]        Today's Diagnoses     Need for prophylactic vaccination and inoculation against influenza    -  1       Follow-ups after your visit        Your next 10 appointments already scheduled     Nov 12, 2018  2:40 PM CST   Office Visit with JAMES Durbin CNP   [facility]    [address] [facility] [address] [phone]           Bring a current list of meds and any records pertaining to this visit. For Physicals, please bring immunization records and any forms needing to be filled out. Please arrive 10 minutes early to complete paperwork.              Who to contact     If you have questions or need follow up information about today's clinic visit or your schedule please contact [facility] directly at [phone].  Normal or non-critical lab and imaging results will be communicated to you by MyChart, letter or phone within 4 business days after the clinic has received the results. If you do not hear from us within 7 days, please contact the clinic through MyChart or phone. If you have a critical or abnormal lab result, we will notify you by phone as soon as possible.  Submit refill requests through [employer] or call your pharmacy and they will forward the refill request to us. Please allow 3 business days for your refill to be completed.          Additional Information About Your Visit        Care EveryWhere ID     This is your Care EveryWhere ID. This could be used by other organizations to access your [place] medical records  [id number]         Blood Pressure from Last 3 Encounters:   10/22/18 116/72   05/08/18 116/68   04/27/18 118/70    Weight from  Last 3 Encounters:   05/08/18 138 lb (62.6 kg)   04/27/18 135 lb 9.6 oz (61.5 kg)   01/31/18 139 lb (63 kg)              We Performed the Following     FLU VACCINE, SPLIT VIRUS, IM (QUADRIVALENT) [96038]- >3 YRS     Vaccine Administration, Initial [53789]        Primary Care Provider Office Phone # Fax #    JAMES Durbin [phone]       [address] [place] [address]        Equal Access to Services     [facility]: Hadii aad ku hadasho Soomaali, waaxda luqadaha, qaybta kaalmada adeegyada, waxcarlota braswell . So [facility] [phone].    ATENCIÓN: Si habla español, tiene a rosario disposición servicios gratuitos de asistencia lingüística. [facility] [phone].    We comply with applicable federal civil rights laws and [place] laws. We do not discriminate on the basis of race, color, national origin, age, disability, sex, sexual orientation, or gender identity.            Thank you!     Thank you for choosing [facility]  for your care. Our goal is always to provide you with excellent care. Hearing back from our patients is one way we can continue to improve our services. Please take a few minutes to complete the written survey that you may receive in the mail after your visit with us. Thank you!             Your Updated Medication List - Protect others around you: Learn how to safely use, store and throw away your medicines at www.disposemymeds.org.          This list is accurate as of 10/22/18  4:34 PM.  Always use your most recent med list.                   Brand Name Dispense Instructions for use Diagnosis    amphetamine-dextroamphetamine 20 MG per 24 hr capsule    ADDERALL XR    30 capsule    Take 1 capsule (20 mg) by mouth daily    Attention deficit hyperactivity disorder (ADHD), combined type       levonorgestrel 20 MCG/24HR IUD    MIRENA     1 each (20 mcg) by Intrauterine route continuous    Encounter for IUD insertion, Presence of  intrauterine contraceptive device       loratadine 10 MG tablet    CLARITIN    30 tablet    Take 1 tablet (10 mg) by mouth daily    Chronic seasonal allergic rhinitis, unspecified trigger       Phenylephrine-Polyvinyl Alc 0.12-1.4 % Soln    REFRESH REDNESS RELIEF    1 Bottle    Apply 1 drop to eye 2 times daily as needed    Chronic seasonal allergic rhinitis, unspecified trigger

## 2018-10-22 NOTE — PATIENT INSTRUCTIONS
Recommend continue to wear cam boot during work and when ambulating. May do range of motion of ankle to help preserver muscle.     Return to clinic in 3 weeks for follow up.     Thank you  Nellie Wang CNP

## 2018-11-07 NOTE — PROGRESS NOTES
SUBJECTIVE:   Donald Duarte is a 23 year old female who presents to clinic today for the following health issues:      HPI  Concern - L Foot injury at work RECHECK   Onset: DOI 10/18/18    Description:   Foot is feeling much better. Not using the boot or brace most of the time. Still hurts a little     Not sure that she is ready for restrictions to be lifted.     She has not been wearing cam boot and is having mild intermittent pain. No deformity of foot noted. CMS is intact. 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 Has been wearing appropriate foot attire.    Will recheck xray today unfortunately unable to view initial xray result note is available.       It has been about 4 weeks from initial injury.    Problem list and histories reviewed & adjusted, as indicated.  Additional history: as documented    Current Outpatient Prescriptions   Medication Sig Dispense Refill     amphetamine-dextroamphetamine (ADDERALL XR) 20 MG per 24 hr capsule Take 1 capsule (20 mg) by mouth daily 30 capsule 0     levonorgestrel (MIRENA) 20 MCG/24HR IUD 1 each (20 mcg) by Intrauterine route continuous       loratadine (CLARITIN) 10 MG tablet Take 1 tablet (10 mg) by mouth daily 30 tablet 1     Phenylephrine-Polyvinyl Alc (REFRESH REDNESS RELIEF) 0.12-1.4 % SOLN Apply 1 drop to eye 2 times daily as needed 1 Bottle 0     BP Readings from Last 3 Encounters:   11/12/18 118/70   10/22/18 116/72   05/08/18 116/68    Wt Readings from Last 3 Encounters:   11/12/18 132 lb 11.2 oz (60.2 kg)   05/08/18 138 lb (62.6 kg)   04/27/18 135 lb 9.6 oz (61.5 kg)                  Labs reviewed in EPIC    ROS:  Constitutional, HEENT, cardiovascular, pulmonary, gi and gu systems are negative, except as otherwise noted.    OBJECTIVE:     /70  Pulse 78  Temp 98.7  F (37.1  C) (Temporal)  Resp 16  Wt 132 lb 11.2 oz (60.2 kg)  BMI 24.87 kg/m2  Body mass index is 24.87 kg/(m^2).  GENERAL: healthy,  alert and no distress  MS: left foot lateral, toes and ankle ROM ankle intact, CMS intact.negative for inflammation. Denies pain     ASSESSMENT/PLAN:     1. Closed nondisplaced fracture of fifth metatarsal bone of left foot with routine healing, subsequent encounter  Xray completed will wait for radiologist read. Discussed that she should be wearing boot to protect bone and keep in proper alignment. She states she will do this. If area is not healing well will have her see podiatry specialty     - XR Foot Left G/E 3 Views; Future    Patient Instructions   I will call you with xray results and further treatment as indicated.     Thank you  Nellie Wang [facility]

## 2018-11-12 ENCOUNTER — RADIANT APPOINTMENT (OUTPATIENT)
Dept: GENERAL RADIOLOGY | Facility: OTHER | Age: 23
End: 2018-11-12
Attending: NURSE PRACTITIONER
Payer: COMMERCIAL

## 2018-11-12 ENCOUNTER — OFFICE VISIT (OUTPATIENT)
Dept: FAMILY MEDICINE | Facility: OTHER | Age: 23
End: 2018-11-12
Payer: OTHER MISCELLANEOUS

## 2018-11-12 VITALS
RESPIRATION RATE: 16 BRPM | WEIGHT: 132.7 LBS | DIASTOLIC BLOOD PRESSURE: 70 MMHG | HEART RATE: 78 BPM | BODY MASS INDEX: 24.87 KG/M2 | SYSTOLIC BLOOD PRESSURE: 118 MMHG | TEMPERATURE: 98.7 F

## 2018-11-12 DIAGNOSIS — S92.355D CLOSED NONDISPLACED FRACTURE OF FIFTH METATARSAL BONE OF LEFT FOOT WITH ROUTINE HEALING, SUBSEQUENT ENCOUNTER: Primary | ICD-10-CM

## 2018-11-12 DIAGNOSIS — S92.355D CLOSED NONDISPLACED FRACTURE OF FIFTH METATARSAL BONE OF LEFT FOOT WITH ROUTINE HEALING, SUBSEQUENT ENCOUNTER: ICD-10-CM

## 2018-11-12 PROCEDURE — 73630 X-RAY EXAM OF FOOT: CPT | Mod: LT

## 2018-11-12 PROCEDURE — 99213 OFFICE O/P EST LOW 20 MIN: CPT | Performed by: NURSE PRACTITIONER

## 2018-11-12 NOTE — MR AVS SNAPSHOT
After Visit Summary   11/12/2018    Donald Duarte    MRN: [id number]           Patient Information     Date Of Birth          1995        Visit Information        Provider Department      11/12/2018 2:40 PM Nellie Wang APRN CNP [facility]        Today's Diagnoses     Closed nondisplaced fracture of fifth metatarsal bone of left foot with routine healing, subsequent encounter    -  1      Care Instructions    I will call you with xray results and further treatment as indicated.     Thank you  Nellie Wang CNP            Follow-ups after your visit        Who to contact     If you have questions or need follow up information about today's clinic visit or your schedule please contact [facility] directly at [phone].  Normal or non-critical lab and imaging results will be communicated to you by MyChart, letter or phone within 4 business days after the clinic has received the results. If you do not hear from us within 7 days, please contact the clinic through MyChart or phone. If you have a critical or abnormal lab result, we will notify you by phone as soon as possible.  Submit refill requests through [employer] or call your pharmacy and they will forward the refill request to us. Please allow 3 business days for your refill to be completed.          Additional Information About Your Visit        Care EveryWhere ID     This is your Care EveryWhere ID. This could be used by other organizations to access your [facility] medical records  [id number]        Your Vitals Were     Pulse Temperature Respirations BMI (Body Mass Index)          78 98.7  F (37.1  C) (Temporal) 16 24.87 kg/m2         Blood Pressure from Last 3 Encounters:   11/12/18 118/70   10/22/18 116/72   05/08/18 116/68    Weight from Last 3 Encounters:   11/12/18 132 lb 11.2 oz (60.2 kg)   05/08/18 138 lb (62.6 kg)   04/27/18 135 lb 9.6 oz (61.5 kg)               Primary Care Provider  Office Phone # Fax #    JAMES Durbin [phone] [address] [place] [address]        Equal Access to Services     BRANDAN BARNHART : Hadii artis ku jadeno Soomaali, waaxda luqadaha, qaybta kaalmada adeegyada, delio willinghamn stephie deleon laJonnalea isbell. So [facility] [phone].    ATENCIÓN: Si habla español, tiene a rosario disposición servicios gratuitos de asistencia lingüística. Llame al [phone].    We comply with applicable federal civil rights laws and [place] laws. We do not discriminate on the basis of race, color, national origin, age, disability, sex, sexual orientation, or gender identity.            Thank you!     Thank you for choosing [facility]  for your care. Our goal is always to provide you with excellent care. Hearing back from our patients is one way we can continue to improve our services. Please take a few minutes to complete the written survey that you may receive in the mail after your visit with us. Thank you!             Your Updated Medication List - Protect others around you: Learn how to safely use, store and throw away your medicines at www.disposemymeds.org.          This list is accurate as of 11/12/18  3:15 PM.  Always use your most recent med list.                   Brand Name Dispense Instructions for use Diagnosis    amphetamine-dextroamphetamine 20 MG per 24 hr capsule    ADDERALL XR    30 capsule    Take 1 capsule (20 mg) by mouth daily    Attention deficit hyperactivity disorder (ADHD), combined type       levonorgestrel 20 MCG/24HR IUD    MIRENA     1 each (20 mcg) by Intrauterine route continuous    Encounter for IUD insertion, Presence of intrauterine contraceptive device       loratadine 10 MG tablet    CLARITIN    30 tablet    Take 1 tablet (10 mg) by mouth daily    Chronic seasonal allergic rhinitis, unspecified trigger       Phenylephrine-Polyvinyl Alc 0.12-1.4 % Soln    REFRESH REDNESS RELIEF    1 Bottle    Apply  1 drop to eye 2 times daily as needed    Chronic seasonal allergic rhinitis, unspecified trigger

## 2018-11-12 NOTE — PATIENT INSTRUCTIONS
I will call you with xray results and further treatment as indicated.     Thank you  Nellie Wang CNP

## 2018-11-13 ENCOUNTER — TELEPHONE (OUTPATIENT)
Dept: FAMILY MEDICINE | Facility: OTHER | Age: 23
End: 2018-11-13

## 2018-11-13 NOTE — TELEPHONE ENCOUNTER
Results given to patient, no further questions  Closing encounter  Piper Medrano RT (R)         Please advise Donald Duarte, KARYNA 1995, that her xray results were positive for fracture has not healed there is minimal displacement. Recommend wearing cam boot as discussed. Follow up 6-7 weeks for xray to monitor healing.   [phone] (home)   Thank you   Nellie Wang CNP

## 2018-11-13 NOTE — PROGRESS NOTES
Please advise Donald Duarte,  1995, that her xray results were positive for fracture has not healed there is minimal displacement. Recommend wearing cam boot as discussed. Follow up 6-7 weeks for xray to monitor healing.   [phone] (home)   Thank you  Nellie Wang CNP

## 2018-11-16 ENCOUNTER — TELEPHONE (OUTPATIENT)
Dept: FAMILY MEDICINE | Facility: OTHER | Age: 23
End: 2018-11-16

## 2018-11-16 NOTE — TELEPHONE ENCOUNTER
Spoke with pt and relayed info below. She was disappointed but understood. Declined second opinion as of now.

## 2018-11-16 NOTE — TELEPHONE ENCOUNTER
"Not willing to lift restrictions as her foot is not healed please see telephone 18.     \"please advise Donald Duarte,  1995, that her xray results were positive for fracture has not healed there is minimal displacement. Recommend wearing cam boot as discussed. Follow up 6-7 weeks for xray to monitor healing. \"    If she would like another opinion I can place referral for her to see podiatry.     Thank you   Nellie Wang CNP    "

## 2018-11-16 NOTE — TELEPHONE ENCOUNTER
Reason for Call:  Other call back    Detailed comments: pt was seen last week with WS and given restrictions for her work. She is calling today asking for those restrictions to be lifted. Please advise.     Phone Number Patient can be reached at: Home number on file [phone] (home)    Best Time: any     Can we leave a detailed message on this number? YES    Call taken on 11/16/2018 at 10:26 AM by Linda Cardenas

## 2018-11-21 ENCOUNTER — TELEPHONE (OUTPATIENT)
Dept: FAMILY MEDICINE | Facility: OTHER | Age: 23
End: 2018-11-21

## 2018-11-21 NOTE — LETTER
[address]   [facility] [address]  Phone: [phone]    November 21, 2018        Donald Duarte  [address] [facility] [address]          To whom it may concern:    RE: Donald Duarte    Patient may return to work with the following: Patient may return to work with restriction of minimal weight bearing (ambulate only with fracture boot) no lifting with ambulation.     Please contact me for questions or concerns.      Sincerely,        JAMES Rivas CNP

## 2018-12-04 ENCOUNTER — OFFICE VISIT (OUTPATIENT)
Dept: OBGYN | Facility: OTHER | Age: 23
End: 2018-12-04
Payer: COMMERCIAL

## 2018-12-04 ENCOUNTER — TELEPHONE (OUTPATIENT)
Dept: FAMILY MEDICINE | Facility: OTHER | Age: 23
End: 2018-12-04

## 2018-12-04 VITALS
HEART RATE: 72 BPM | BODY MASS INDEX: 24.78 KG/M2 | SYSTOLIC BLOOD PRESSURE: 110 MMHG | DIASTOLIC BLOOD PRESSURE: 66 MMHG | WEIGHT: 132.25 LBS

## 2018-12-04 DIAGNOSIS — F90.2 ATTENTION DEFICIT HYPERACTIVITY DISORDER (ADHD), COMBINED TYPE: ICD-10-CM

## 2018-12-04 DIAGNOSIS — N76.0 BV (BACTERIAL VAGINOSIS): ICD-10-CM

## 2018-12-04 DIAGNOSIS — Z11.3 ROUTINE SCREENING FOR STI (SEXUALLY TRANSMITTED INFECTION): ICD-10-CM

## 2018-12-04 DIAGNOSIS — N89.8 VAGINAL DISCHARGE: Primary | ICD-10-CM

## 2018-12-04 DIAGNOSIS — B96.89 BV (BACTERIAL VAGINOSIS): ICD-10-CM

## 2018-12-04 DIAGNOSIS — Z30.431 IUD CHECK UP: ICD-10-CM

## 2018-12-04 LAB
SPECIMEN SOURCE: ABNORMAL
WET PREP SPEC: ABNORMAL

## 2018-12-04 PROCEDURE — 99213 OFFICE O/P EST LOW 20 MIN: CPT | Performed by: ADVANCED PRACTICE MIDWIFE

## 2018-12-04 PROCEDURE — 87591 N.GONORRHOEAE DNA AMP PROB: CPT | Performed by: ADVANCED PRACTICE MIDWIFE

## 2018-12-04 PROCEDURE — 87491 CHLMYD TRACH DNA AMP PROBE: CPT | Performed by: ADVANCED PRACTICE MIDWIFE

## 2018-12-04 PROCEDURE — 87210 SMEAR WET MOUNT SALINE/INK: CPT | Performed by: ADVANCED PRACTICE MIDWIFE

## 2018-12-04 RX ORDER — METRONIDAZOLE 7.5 MG/G
1 GEL VAGINAL AT BEDTIME
Qty: 70 G | Refills: 0 | Status: SHIPPED | OUTPATIENT
Start: 2018-12-04 | End: 2019-03-20

## 2018-12-04 RX ORDER — DEXTROAMPHETAMINE SACCHARATE, AMPHETAMINE ASPARTATE MONOHYDRATE, DEXTROAMPHETAMINE SULFATE AND AMPHETAMINE SULFATE 5; 5; 5; 5 MG/1; MG/1; MG/1; MG/1
20 CAPSULE, EXTENDED RELEASE ORAL DAILY
Qty: 30 CAPSULE | Refills: 0 | Status: CANCELLED | OUTPATIENT
Start: 2018-12-04

## 2018-12-04 NOTE — NURSING NOTE
"Chief Complaint   Patient presents with     Vaginal Problem     check for yeast infection       Initial /66 (BP Location: Right arm, Patient Position: Sitting, Cuff Size: Adult Regular)  Pulse 72  Wt 132 lb 4 oz (60 kg)  BMI 24.78 kg/m2 Estimated body mass index is 24.78 kg/(m^2) as calculated from the following:    Height as of 5/8/18: 5' 1.25\" (1.556 m).    Weight as of this encounter: 132 lb 4 oz (60 kg).  Medication Reconciliation: complete    Antonina Ayala CMA    "

## 2018-12-04 NOTE — MR AVS SNAPSHOT
After Visit Summary   12/4/2018    Donald Duarte    MRN: [id number]           Patient Information     Date Of Birth          1995        Visit Information        Provider Department      12/4/2018 2:30 PM Ignacia Dow APRN CNM [facility]        Today's Diagnoses     Vaginal discharge    -  1    Routine screening for STI (sexually transmitted infection)        IUD check up        BV (bacterial vaginosis)           Follow-ups after your visit        Who to contact     If you have questions or need follow up information about today's clinic visit or your schedule please contact [facility] directly at [phone].  Normal or non-critical lab and imaging results will be communicated to you by MyChart, letter or phone within 4 business days after the clinic has received the results. If you do not hear from us within 7 days, please contact the clinic through MyChart or phone. If you have a critical or abnormal lab result, we will notify you by phone as soon as possible.  Submit refill requests through [employer] or call your pharmacy and they will forward the refill request to us. Please allow 3 business days for your refill to be completed.          Additional Information About Your Visit        Care EveryWhere ID     This is your Care EveryWhere ID. This could be used by other organizations to access your [place] medical records  [id number]        Your Vitals Were     Pulse BMI (Body Mass Index)                72 24.78 kg/m2           Blood Pressure from Last 3 Encounters:   12/04/18 110/66   11/12/18 118/70   10/22/18 116/72    Weight from Last 3 Encounters:   12/04/18 132 lb 4 oz (60 kg)   11/12/18 132 lb 11.2 oz (60.2 kg)   05/08/18 138 lb (62.6 kg)              We Performed the Following     Chlamydia trachomatis PCR     Neisseria gonorrhoeae PCR     Wet prep          Today's Medication Changes          These changes are accurate as of 12/4/18  3:18 PM.   If you have any questions, ask your nurse or doctor.               Start taking these medicines.        Dose/Directions    metroNIDAZOLE 0.75 % vaginal gel   Commonly known as:  METROGEL   Used for:  BV (bacterial vaginosis)   Started by:  Ignacia Dow APRN CNM        Dose:  1 applicator   Place 1 applicator (5 g) vaginally At Bedtime for 5 days   Quantity:  70 g   Refills:  0            Where to get your medicines      These medications were sent to [facility] [address] [address] [address] [facility]  [address] [facility] [address]     Phone:  [phone]     metroNIDAZOLE 0.75 % vaginal gel                Primary Care Provider Office Phone # Fax #    Nellie Sanchez JAMES Diallo [phone]       [address] [place] [address]        Equal Access to Services     [facility] : Hadii artis almaguer hadasho Soomaali, waaxda luqadaha, qaybta kaalmada adeegyada, delio braswell . So [facility] [place] [facility] [phone].    ATENCIÓN: Si habla español, tiene a rosario disposición servicios gratuitos de asistencia lingüística. Jignesh al [phone].    We comply with applicable federal civil rights laws and [place] laws. We do not discriminate on the basis of race, color, national origin, age, disability, sex, sexual orientation, or gender identity.            Thank you!     Thank you for choosing [facility]  for your care. Our goal is always to provide you with excellent care. Hearing back from our patients is one way we can continue to improve our services. Please take a few minutes to complete the written survey that you may receive in the mail after your visit with us. Thank you!             Your Updated Medication List - Protect others around you: Learn how to safely use, store and throw away your medicines at www.disposemymeds.org.          This list is accurate as of 12/4/18  3:18 PM.  Always use your most recent med list.                   Brand Name  Dispense Instructions for use Diagnosis    amphetamine-dextroamphetamine 20 MG 24 hr capsule    ADDERALL XR    30 capsule    Take 1 capsule (20 mg) by mouth daily    Attention deficit hyperactivity disorder (ADHD), combined type       levonorgestrel 20 MCG/24HR IUD    MIRENA     1 each (20 mcg) by Intrauterine route continuous    Encounter for IUD insertion, Presence of intrauterine contraceptive device       loratadine 10 MG tablet    CLARITIN    30 tablet    Take 1 tablet (10 mg) by mouth daily    Chronic seasonal allergic rhinitis, unspecified trigger       metroNIDAZOLE 0.75 % vaginal gel    METROGEL    70 g    Place 1 applicator (5 g) vaginally At Bedtime for 5 days    BV (bacterial vaginosis)       Phenylephrine-Polyvinyl Alc 0.12-1.4 % Soln    REFRESH REDNESS RELIEF    1 Bottle    Apply 1 drop to eye 2 times daily as needed    Chronic seasonal allergic rhinitis, unspecified trigger

## 2018-12-04 NOTE — PROGRESS NOTES
Donald Duarte is a 23 year old who presents to the clinic for evaluation of vaginal changes      Vaginal Symptoms      Onset 2 weeks number of episodes of vaginitis in the past year 1    Description  vaginal discharge and odor    Intensity:  mild    Accompanying signs and symptoms (fever/dysuria/abdominal or back pain): None    History  Sexually active: yes, single partner, contraception - IUD  Currently pregnant: no  Possibility of pregnancy: No  Recent antibiotic use: no  Diabetes: no  Precipitating or alleviating factors: None    Therapies tried and outcome: none   Outcome: NA         Histories reviewed and updated  Past Medical History:   Diagnosis Date     DENISE (generalized anxiety disorder) 11/30/2013     Past Surgical History:   Procedure Laterality Date     DENTAL SURGERY      wisdom teeth     Social History     Social History     Marital status: Single     Spouse name: N/A     Number of children: N/A     Years of education: N/A     Occupational History     Not on file.     Social History Main Topics     Smoking status: Never Smoker     Smokeless tobacco: Never Used     Alcohol use No     Drug use: No     Sexual activity: No     Other Topics Concern     Parent/Sibling W/ Cabg, Mi Or Angioplasty Before 65f 55m? No     Social History Narrative     Family History   Problem Relation Age of Onset     Hypertension Mother      Arthritis Paternal Grandmother      lupus     Breast Cancer Paternal Aunt             ROS: 10 point ROS neg other than the symptoms noted above in the HPI.    EXAM:  /66 (BP Location: Right arm, Patient Position: Sitting, Cuff Size: Adult Regular)  Pulse 72  Wt 132 lb 4 oz (60 kg)  BMI 24.78 kg/m2  Patient appears well, vital signs normal.   Abdomen normal, soft without tenderness, guarding, mass or organomegaly.  No inguinal adenopathy or CVA tenderness.    : PELVIC EXAM:  Vulva: No external lesions, normal hair distribution, no adenopathy, BUS WNL  Vagina: Moist, pink, no  abnormal discharge, well rugated, no lesions  Cervix: smooth, pink, no visible lesions, neg CMT  iud strings at os 3 cm  Uterus: Normal size, anteverted, non-tender, mobile  Ovaries: No mass, non-tender, mobile  Rectal exam: deferred    WET PREP: clue cells   GC and Chlamydia offered: Done    ASSESSMENT:   bacterial vaginosis.      PLAN:  STD prevention discussed.   Birth control method reviewed.  Abstain from intercourse for duration of treatment.  Return if symptoms do not resolve as anticipated.

## 2018-12-05 NOTE — TELEPHONE ENCOUNTER
Was given shobha refill in October will need ov for further refills.     Thank you  Nellie Wang CNP

## 2018-12-05 NOTE — TELEPHONE ENCOUNTER
Requested Prescriptions   Pending Prescriptions Disp Refills     amphetamine-dextroamphetamine (ADDERALL XR) 20 MG 24 hr capsule 30 capsule 0     Sig: Take 1 capsule (20 mg) by mouth daily    There is no refill protocol information for this order        amphetamine-dextroamphetamine (ADDERALL XR) 20 MG per 24 hr capsule      Last Written Prescription Date:  10/17/2018  Last Fill Quantity: 30,   # refills: 0  Last Office Visit: 11/12/2018  Future Office visit:       Routing refill request to provider for review/approval because:  Drug not on the G, P or [facility] refill protocol or controlled substance  Génesis Joyner RN, BSN

## 2018-12-06 ENCOUNTER — TELEPHONE (OUTPATIENT)
Dept: OBGYN | Facility: OTHER | Age: 23
End: 2018-12-06

## 2018-12-06 NOTE — TELEPHONE ENCOUNTER
Patient return call regarding results.  Patient was informed of negative results.    Latoya Liao, ELIU  December 6, 2018

## 2018-12-31 NOTE — PROGRESS NOTES
SUBJECTIVE:   Donald Duarte is a 23 year old female who presents to clinic today for the following health issues:      Answers for HPI/ROS submitted by the patient on 1/2/2019   Chronic problems general questions HPI Form  DENISE 7 TOTAL SCORE: 4  If you checked off any problems, how difficult have these problems made it for you to do your work, take care of things at home, or get along with other people?: Not difficult at all  PHQ9 TOTAL SCORE: 8    History of Present Illness     Diet:  Regular (no restrictions)  Frequency of exercise:  2-3 days/week  Duration of exercise:  30-45 minutes  Taking medications regularly:  Not Applicable  Medication side effects:  None  Additional concerns today:  No    SUBJECTIVE:  Patient is here today to recheck ADHD/ADD.    Routine for taking medicine, including time: 5 am  Time medicine wears off: 4 pm   Issues at school/Work: none   Issues at home: none   Control of symptoms: well controlled     Side effects:  Headaches: No  Stomach aches: No  Irritability/mood swings: No  Difficulties with sleep: No  Social withdrawal: No  Unusual movements/tics: No  Decreased appetite: No    She is not taking during school break and notices she is more irritable when not taking. She usually takes 3 time weekly or daily as she needs for lifestyle    PROBLEMS TO ADD ON...  Problem list and histories reviewed & adjusted, as indicated.  Additional history: as documented    L ankle pain       Duration: 2 weeks     Description (location/character/radiation): Rolled the same ankle pt fractured months ago. Missed the last step and fell on ankle     Intensity:  moderate    Accompanying signs and symptoms: swelling and bruising is getting better. Doesn't hurt much now.          Patient Active Problem List   Diagnosis     DENISE (generalized anxiety disorder)     Steppage gait     Pes valgus, unspecified laterality     Attention deficit hyperactivity disorder (ADHD), combined type     Presence of  intrauterine contraceptive device     Tachycardia     Social anxiety disorder     Past Surgical History:   Procedure Laterality Date     DENTAL SURGERY      wisdom teeth       Social History     Tobacco Use     Smoking status: Never Smoker     Smokeless tobacco: Never Used   Substance Use Topics     Alcohol use: No     Family History   Problem Relation Age of Onset     Hypertension Mother      Arthritis Paternal Grandmother         lupus     Breast Cancer Paternal Aunt          Current Outpatient Medications   Medication Sig Dispense Refill     [START ON 1/9/2019] amphetamine-dextroamphetamine (ADDERALL XR) 20 MG 24 hr capsule Take 1 capsule (20 mg) by mouth daily 30 capsule 0     [START ON 2/9/2019] amphetamine-dextroamphetamine (ADDERALL XR) 20 MG 24 hr capsule Take 1 capsule (20 mg) by mouth daily 30 capsule 0     [START ON 3/12/2019] amphetamine-dextroamphetamine (ADDERALL XR) 20 MG 24 hr capsule Take 1 capsule (20 mg) by mouth daily 30 capsule 0     amphetamine-dextroamphetamine (ADDERALL XR) 20 MG per 24 hr capsule Take 1 capsule (20 mg) by mouth daily 30 capsule 0     levonorgestrel (MIRENA) 20 MCG/24HR IUD 1 each (20 mcg) by Intrauterine route continuous       loratadine (CLARITIN) 10 MG tablet Take 1 tablet (10 mg) by mouth daily 30 tablet 1     Phenylephrine-Polyvinyl Alc (REFRESH REDNESS RELIEF) 0.12-1.4 % SOLN Apply 1 drop to eye 2 times daily as needed 1 Bottle 0     Allergies   Allergen Reactions     No Known Allergies      BP Readings from Last 3 Encounters:   01/02/19 112/68   12/04/18 110/66   11/12/18 118/70    Wt Readings from Last 3 Encounters:   01/02/19 59 kg (130 lb)   12/04/18 60 kg (132 lb 4 oz)   11/12/18 60.2 kg (132 lb 11.2 oz)                  Labs reviewed in EPIC    ROS:  Constitutional, HEENT, cardiovascular, pulmonary, gi and gu systems are negative, except as otherwise noted.    OBJECTIVE:     /68   Pulse 90   Temp 98.6  F (37  C) (Temporal)   Resp 16   Wt 59 kg (130 lb)    SpO2 99%   BMI 24.36 kg/m    Body mass index is 24.36 kg/m .  GENERAL: healthy, alert and no distress  NECK: no adenopathy, no asymmetry, masses, or scars and thyroid normal to palpation  RESP: lungs clear to auscultation - no rales, rhonchi or wheezes  CV: regular rate and rhythm, normal S1 S2, no S3 or S4, no murmur, click or rub, no peripheral edema and peripheral pulses strong  MS: left foot lateral bruising noted, negative for inflammation. ROM tender.   SKIN: no suspicious lesions or rashes  PSYCH: mentation appears normal, affect normal/bright        ASSESSMENT/PLAN:     1. Attention deficit hyperactivity disorder (ADHD), combined type  Refills given she will be due for refill in March may have additional 3 mos at that time.   - amphetamine-dextroamphetamine (ADDERALL XR) 20 MG 24 hr capsule; Take 1 capsule (20 mg) by mouth daily  Dispense: 30 capsule; Refill: 0  - amphetamine-dextroamphetamine (ADDERALL XR) 20 MG 24 hr capsule; Take 1 capsule (20 mg) by mouth daily  Dispense: 30 capsule; Refill: 0  - amphetamine-dextroamphetamine (ADDERALL XR) 20 MG 24 hr capsule; Take 1 capsule (20 mg) by mouth daily  Dispense: 30 capsule; Refill: 0    2. Foot injury, left, initial encounter  Second injury of same foot. She was due to have repeat xray from previous injury and fracture but did not follow up will xray today as she has now re injured this foot.     - XR Foot Left G/E 3 Views; Future    Patient Instructions   I will call you with your xray results. Recommend in mean time using boot as you have been rest and elevate    Refill given for 3 mos adderall XR 20 mg may have additional 3 mos in March   Will need to rt      JAMES Rivas [facility]      Answers for HPI/ROS submitted by the patient on 1/2/2019   Chronic problems general questions HPI Form  DENISE 7 TOTAL SCORE: 4  If you checked off any problems, how difficult have these problems made it for you to do your work, take care  of things at home, or get along with other people?: Not difficult at all  PHQ9 TOTAL SCORE: 8

## 2019-01-02 ENCOUNTER — ANCILLARY PROCEDURE (OUTPATIENT)
Dept: GENERAL RADIOLOGY | Facility: OTHER | Age: 24
End: 2019-01-02
Payer: COMMERCIAL

## 2019-01-02 ENCOUNTER — OFFICE VISIT (OUTPATIENT)
Dept: FAMILY MEDICINE | Facility: OTHER | Age: 24
End: 2019-01-02
Payer: COMMERCIAL

## 2019-01-02 VITALS
DIASTOLIC BLOOD PRESSURE: 68 MMHG | TEMPERATURE: 98.6 F | WEIGHT: 130 LBS | BODY MASS INDEX: 24.36 KG/M2 | OXYGEN SATURATION: 99 % | SYSTOLIC BLOOD PRESSURE: 112 MMHG | RESPIRATION RATE: 16 BRPM | HEART RATE: 90 BPM

## 2019-01-02 DIAGNOSIS — S99.922A FOOT INJURY, LEFT, INITIAL ENCOUNTER: ICD-10-CM

## 2019-01-02 DIAGNOSIS — F90.2 ATTENTION DEFICIT HYPERACTIVITY DISORDER (ADHD), COMBINED TYPE: Primary | ICD-10-CM

## 2019-01-02 PROCEDURE — 99214 OFFICE O/P EST MOD 30 MIN: CPT | Performed by: NURSE PRACTITIONER

## 2019-01-02 PROCEDURE — 73630 X-RAY EXAM OF FOOT: CPT | Mod: LT

## 2019-01-02 RX ORDER — DEXTROAMPHETAMINE SACCHARATE, AMPHETAMINE ASPARTATE MONOHYDRATE, DEXTROAMPHETAMINE SULFATE AND AMPHETAMINE SULFATE 5; 5; 5; 5 MG/1; MG/1; MG/1; MG/1
20 CAPSULE, EXTENDED RELEASE ORAL DAILY
Qty: 30 CAPSULE | Refills: 0 | Status: SHIPPED | OUTPATIENT
Start: 2019-03-12 | End: 2019-08-09

## 2019-01-02 RX ORDER — DEXTROAMPHETAMINE SACCHARATE, AMPHETAMINE ASPARTATE MONOHYDRATE, DEXTROAMPHETAMINE SULFATE AND AMPHETAMINE SULFATE 5; 5; 5; 5 MG/1; MG/1; MG/1; MG/1
20 CAPSULE, EXTENDED RELEASE ORAL DAILY
Qty: 30 CAPSULE | Refills: 0 | Status: SHIPPED | OUTPATIENT
Start: 2019-02-09 | End: 2019-03-20

## 2019-01-02 RX ORDER — DEXTROAMPHETAMINE SACCHARATE, AMPHETAMINE ASPARTATE MONOHYDRATE, DEXTROAMPHETAMINE SULFATE AND AMPHETAMINE SULFATE 5; 5; 5; 5 MG/1; MG/1; MG/1; MG/1
20 CAPSULE, EXTENDED RELEASE ORAL DAILY
Qty: 30 CAPSULE | Refills: 0 | Status: SHIPPED | OUTPATIENT
Start: 2019-01-09 | End: 2019-03-20

## 2019-01-02 ASSESSMENT — ANXIETY QUESTIONNAIRES
5. BEING SO RESTLESS THAT IT IS HARD TO SIT STILL: NOT AT ALL
7. FEELING AFRAID AS IF SOMETHING AWFUL MIGHT HAPPEN: NOT AT ALL
2. NOT BEING ABLE TO STOP OR CONTROL WORRYING: NOT AT ALL
GAD7 TOTAL SCORE: 4
GAD7 TOTAL SCORE: 4
7. FEELING AFRAID AS IF SOMETHING AWFUL MIGHT HAPPEN: NOT AT ALL
3. WORRYING TOO MUCH ABOUT DIFFERENT THINGS: NOT AT ALL
4. TROUBLE RELAXING: SEVERAL DAYS
6. BECOMING EASILY ANNOYED OR IRRITABLE: MORE THAN HALF THE DAYS
1. FEELING NERVOUS, ANXIOUS, OR ON EDGE: SEVERAL DAYS
GAD7 TOTAL SCORE: 4

## 2019-01-02 ASSESSMENT — PATIENT HEALTH QUESTIONNAIRE - PHQ9
SUM OF ALL RESPONSES TO PHQ QUESTIONS 1-9: 8
10. IF YOU CHECKED OFF ANY PROBLEMS, HOW DIFFICULT HAVE THESE PROBLEMS MADE IT FOR YOU TO DO YOUR WORK, TAKE CARE OF THINGS AT HOME, OR GET ALONG WITH OTHER PEOPLE: NOT DIFFICULT AT ALL
SUM OF ALL RESPONSES TO PHQ QUESTIONS 1-9: 8

## 2019-01-02 NOTE — PATIENT INSTRUCTIONS
I will call you with your xray results. Recommend in mean time using boot as you have been rest and elevate    Refill given for 3 mos adderall XR 20 mg may have additional 3 mos in March   Will need to return to clinic in May 2019

## 2019-01-03 ENCOUNTER — TELEPHONE (OUTPATIENT)
Dept: FAMILY MEDICINE | Facility: OTHER | Age: 24
End: 2019-01-03

## 2019-01-03 ASSESSMENT — PATIENT HEALTH QUESTIONNAIRE - PHQ9: SUM OF ALL RESPONSES TO PHQ QUESTIONS 1-9: 8

## 2019-01-03 ASSESSMENT — ANXIETY QUESTIONNAIRES: GAD7 TOTAL SCORE: 4

## 2019-01-03 NOTE — RESULT ENCOUNTER NOTE
Please advise Donald Duarte,  1995, that her xray results do not indicate new fracture. Please continue treatment as discussed in clinic   [phone] (home)   Thank you  Nellie Wang CNP

## 2019-01-03 NOTE — TELEPHONE ENCOUNTER
Piper RUIZ (R) contacted Donald on 19 and left a message. If patient calls back please inform patient of results below, thanks    Please advise Donald Duarte,  1995, that her xray results do not indicate new fracture. Please continue treatment as discussed in clinic   [phone] (home)   Thank you   Nellie Wang CNP

## 2019-03-19 NOTE — PROGRESS NOTES
SUBJECTIVE:   Donald Duarte is a 23 year old female who presents to clinic today for the following health issues:      HPI     Concern - Left foot injury f/u  Onset: October     Description:   Foot is feeling better. Some pain sometimes but in general it is improving. Pt wore boot for about a month after the last visit in January and noticed a good different. No longer using the boot. Limp has improved.     Is able to get to though work week without difficulty.       Problem list and histories reviewed & adjusted, as indicated.  Additional history: as documented    SUBJECTIVE:  Patient is here today to recheck ADHD/ADD.    Updates since last visit: none   Routine for taking medicine, including time: 5 am   Time medicine wears off: 4 pm   Issues at school/Work: none   Issues at home: none   Control of symptoms: well controlled     Side effects:  Headaches: No  Stomach aches: No  Irritability/mood swings: No  Difficulties with sleep: No  Social withdrawal: No  Unusual movements/tics: No  Decreased appetite: No    Other concerns:       Patient Active Problem List   Diagnosis     DENISE (generalized anxiety disorder)     Steppage gait     Pes valgus, unspecified laterality     Attention deficit hyperactivity disorder (ADHD), combined type     Presence of intrauterine contraceptive device     Tachycardia     Social anxiety disorder     Past Surgical History:   Procedure Laterality Date     DENTAL SURGERY      wisdom teeth       Social History     Tobacco Use     Smoking status: Never Smoker     Smokeless tobacco: Never Used   Substance Use Topics     Alcohol use: No     Family History   Problem Relation Age of Onset     Hypertension Mother      Arthritis Paternal Grandmother         lupus     Breast Cancer Paternal Aunt          Current Outpatient Medications   Medication Sig Dispense Refill     amphetamine-dextroamphetamine (ADDERALL XR) 20 MG 24 hr capsule Take 1 capsule (20 mg) by mouth daily 30 capsule 0      levonorgestrel (MIRENA) 20 MCG/24HR IUD 1 each (20 mcg) by Intrauterine route continuous       loratadine (CLARITIN) 10 MG tablet Take 1 tablet (10 mg) by mouth daily 30 tablet 1     Phenylephrine-Polyvinyl Alc (REFRESH REDNESS RELIEF) 0.12-1.4 % SOLN Apply 1 drop to eye 2 times daily as needed 1 Bottle 0     Allergies   Allergen Reactions     No Known Allergies      BP Readings from Last 3 Encounters:   03/20/19 118/70   01/02/19 112/68   12/04/18 110/66    Wt Readings from Last 3 Encounters:   03/20/19 55.8 kg (123 lb)   01/02/19 59 kg (130 lb)   12/04/18 60 kg (132 lb 4 oz)           Labs reviewed in EPIC    ROS:  Constitutional, HEENT, cardiovascular, pulmonary, GI, , musculoskeletal, neuro, skin, endocrine and psych systems are negative, except as otherwise noted.    OBJECTIVE:     /70   Pulse 80   Temp 98.9  F (37.2  C) (Temporal)   Resp 16   Wt 55.8 kg (123 lb)   SpO2 98%   BMI 23.05 kg/m    Body mass index is 23.05 kg/m .  GENERAL: healthy, alert and no distress  NECK: no adenopathy, no asymmetry, masses, or scars and thyroid normal to palpation  RESP: lungs clear to auscultation - no rales, rhonchi or wheezes  CV: regular rate and rhythm, normal S1 S2, no S3 or S4, no murmur, click or rub, no peripheral edema and peripheral pulses strong  ABDOMEN: soft, nontender, no hepatosplenomegaly, no masses and bowel sounds normal  MS: no gross musculoskeletal defects noted, no edema  PSYCH: mentation appears normal, affect normal/bright    ASSESSMENT/PLAN:     1. Closed avulsion fracture of metatarsal bone of left foot with routine healing, subsequent encounter  States her work is requiring she have an xray today in order to be able to return to work and a letter for restriction release.   She is doing well no further treatment indicated at this time  - XR Foot Left G/E 3 Views; Future    2. Attention deficit hyperactivity disorder (ADHD), combined type  Refills given she is stable no change. Will be  due for ov in Sept. 2019 may have 3 mos refill in July 2019  - amphetamine-dextroamphetamine (ADDERALL XR) 20 MG 24 hr capsule; Take 1 capsule (20 mg) by mouth daily  Dispense: 30 capsule; Refill: 0  - amphetamine-dextroamphetamine (ADDERALL XR) 20 MG 24 hr capsule; Take 1 capsule (20 mg) by mouth daily  Dispense: 30 capsule; Refill: 0  - amphetamine-dextroamphetamine (ADDERALL XR) 20 MG 24 hr capsule; Take 1 capsule (20 mg) by mouth daily  Dispense: 30 capsule; Refill: 0    Patient Instructions   Please return to clinic in 6 mos for ADHD follow up. May call for refill of Adderall XR 20 mg for additional 3 mos in June.     Please return to clinic if left foot pain returns.     Thank you  Nellie Wang [facility]

## 2019-03-20 ENCOUNTER — ANCILLARY PROCEDURE (OUTPATIENT)
Dept: GENERAL RADIOLOGY | Facility: OTHER | Age: 24
End: 2019-03-20
Attending: NURSE PRACTITIONER
Payer: COMMERCIAL

## 2019-03-20 ENCOUNTER — TELEPHONE (OUTPATIENT)
Dept: FAMILY MEDICINE | Facility: OTHER | Age: 24
End: 2019-03-20

## 2019-03-20 ENCOUNTER — OFFICE VISIT (OUTPATIENT)
Dept: FAMILY MEDICINE | Facility: OTHER | Age: 24
End: 2019-03-20
Payer: COMMERCIAL

## 2019-03-20 VITALS
OXYGEN SATURATION: 98 % | SYSTOLIC BLOOD PRESSURE: 118 MMHG | RESPIRATION RATE: 16 BRPM | DIASTOLIC BLOOD PRESSURE: 70 MMHG | WEIGHT: 123 LBS | BODY MASS INDEX: 23.05 KG/M2 | TEMPERATURE: 98.9 F | HEART RATE: 80 BPM

## 2019-03-20 DIAGNOSIS — S92.302K: Primary | ICD-10-CM

## 2019-03-20 DIAGNOSIS — S92.302D CLOSED AVULSION FRACTURE OF METATARSAL BONE OF LEFT FOOT WITH ROUTINE HEALING, SUBSEQUENT ENCOUNTER: ICD-10-CM

## 2019-03-20 DIAGNOSIS — F90.2 ATTENTION DEFICIT HYPERACTIVITY DISORDER (ADHD), COMBINED TYPE: ICD-10-CM

## 2019-03-20 DIAGNOSIS — S92.302D CLOSED AVULSION FRACTURE OF METATARSAL BONE OF LEFT FOOT WITH ROUTINE HEALING, SUBSEQUENT ENCOUNTER: Primary | ICD-10-CM

## 2019-03-20 PROCEDURE — 73630 X-RAY EXAM OF FOOT: CPT | Mod: LT

## 2019-03-20 PROCEDURE — 99214 OFFICE O/P EST MOD 30 MIN: CPT | Performed by: NURSE PRACTITIONER

## 2019-03-20 RX ORDER — DEXTROAMPHETAMINE SACCHARATE, AMPHETAMINE ASPARTATE MONOHYDRATE, DEXTROAMPHETAMINE SULFATE AND AMPHETAMINE SULFATE 5; 5; 5; 5 MG/1; MG/1; MG/1; MG/1
20 CAPSULE, EXTENDED RELEASE ORAL DAILY
Qty: 30 CAPSULE | Refills: 0 | Status: SHIPPED | OUTPATIENT
Start: 2019-06-13 | End: 2019-08-09

## 2019-03-20 RX ORDER — DEXTROAMPHETAMINE SACCHARATE, AMPHETAMINE ASPARTATE MONOHYDRATE, DEXTROAMPHETAMINE SULFATE AND AMPHETAMINE SULFATE 5; 5; 5; 5 MG/1; MG/1; MG/1; MG/1
20 CAPSULE, EXTENDED RELEASE ORAL DAILY
Qty: 30 CAPSULE | Refills: 0 | Status: SHIPPED | OUTPATIENT
Start: 2019-04-12 | End: 2019-08-09

## 2019-03-20 RX ORDER — DEXTROAMPHETAMINE SACCHARATE, AMPHETAMINE ASPARTATE MONOHYDRATE, DEXTROAMPHETAMINE SULFATE AND AMPHETAMINE SULFATE 5; 5; 5; 5 MG/1; MG/1; MG/1; MG/1
20 CAPSULE, EXTENDED RELEASE ORAL DAILY
Qty: 30 CAPSULE | Refills: 0 | Status: SHIPPED | OUTPATIENT
Start: 2019-05-13 | End: 2019-08-09

## 2019-03-20 NOTE — TELEPHONE ENCOUNTER
Piper Medrano RT (R) contacted Donald on 19 and left a message. If patient calls back please inform patient of results below, thanks    Please advise Donald Duarte,  1995, that her xray results were indicating no change in alignment and no callus formation may represent early nonunion which can lead to increased risk of future fracture. I recommend continue wear of boot to decrease risk of fracture and follow up with podiatry I have placed referral for her please help to schedule this appointment.     [phone] (home)   Thank you   Nellie Wang CNP

## 2019-03-20 NOTE — RESULT ENCOUNTER NOTE
Please advise Donald Duarte,  1995, that her xray results were indicating no change in alignment and no callus formation may represent early nonunion which can lead to increased risk of future fracture. I recommend continue wear of boot to decrease risk of fracture and follow up with podiatry I have placed referral for her please help to schedule this appointment.     [phone] (home)   Thank you  Nellie Wang CNP

## 2019-03-20 NOTE — PATIENT INSTRUCTIONS
Please return to clinic in 6 mos for ADHD follow up. May call for refill of Adderall XR 20 mg for additional 3 mos in June.     Please return to clinic if left foot pain returns.     Thank you  Nellie Wang CNP

## 2019-03-21 NOTE — TELEPHONE ENCOUNTER
Reason for Call:  Other returning call    Detailed comments: Patient called clinic back. I relayed message from below. No call back needed.     Phone Number Patient can be reached at: Home number on file [phone] (home)    Best Time: ---    Can we leave a detailed message on this number? Not Applicable    Call taken on 3/21/2019 at 12:25 PM by Zora Stanton

## 2019-04-10 NOTE — TELEPHONE ENCOUNTER
RECORDS RECEIVED FROM: Jeronimo Zuñiga- right foot. No previous surgeries, imaging in [facility]. ok to schedule with Hickman per triage.   DATE RECEIVED: 04/10/19    NOTES STATUS DETAILS   OFFICE NOTE from referring provider N/A    OFFICE NOTE from other specialist Internal  Received - in Epic Dr. Caba 5/8/18    Neurology 9/26/16   DISCHARGE SUMMARY from hospital N/A    DISCHARGE REPORT from the ER N/A    OPERATIVE REPORT N/A    MEDICATION LIST Internal    IMPLANT RECORD/STICKER N/A    LABS     CBC/DIFF N/A    CULTURES N/A    INJECTIONS DONE IN RADIOLOGY N/A    MRI N/A    CT SCAN N/A    XRAYS (IMAGES & REPORTS) Internal R Ankle 5/8/18  R Foot 7/27/16   TUMOR     PATHOLOGY  Slides & report N/A

## 2019-04-17 DIAGNOSIS — M14.679: Primary | ICD-10-CM

## 2019-04-23 ENCOUNTER — OFFICE VISIT (OUTPATIENT)
Dept: ORTHOPEDICS | Facility: CLINIC | Age: 24
End: 2019-04-23
Payer: COMMERCIAL

## 2019-04-23 ENCOUNTER — PRE VISIT (OUTPATIENT)
Dept: ORTHOPEDICS | Facility: CLINIC | Age: 24
End: 2019-04-23

## 2019-04-23 ENCOUNTER — ANCILLARY PROCEDURE (OUTPATIENT)
Dept: GENERAL RADIOLOGY | Facility: CLINIC | Age: 24
End: 2019-04-23
Attending: ORTHOPAEDIC SURGERY
Payer: COMMERCIAL

## 2019-04-23 VITALS — HEIGHT: 61 IN | WEIGHT: 125 LBS | BODY MASS INDEX: 23.6 KG/M2

## 2019-04-23 DIAGNOSIS — M25.571 PAIN IN JOINT, ANKLE AND FOOT, RIGHT: Primary | ICD-10-CM

## 2019-04-23 DIAGNOSIS — M14.679: ICD-10-CM

## 2019-04-23 DIAGNOSIS — M25.572 PAIN IN JOINT, ANKLE AND FOOT, LEFT: ICD-10-CM

## 2019-04-23 ASSESSMENT — ENCOUNTER SYMPTOMS
STIFFNESS: 0
NECK PAIN: 0
MYALGIAS: 0
MUSCLE CRAMPS: 1
BACK PAIN: 1
ARTHRALGIAS: 1
MUSCLE WEAKNESS: 1
JOINT SWELLING: 1

## 2019-04-23 ASSESSMENT — MIFFLIN-ST. JEOR: SCORE: 1264.38

## 2019-04-23 NOTE — LETTER
4/23/2019       RE: Donald Duarte  [address] [facility] [address]     Dear Colleague,    Thank you for referring your patient, Donald Duarte, to the [facility]. Please see a copy of my visit note below.    CHIEF COMPLAINT:  Bilateral foot drop, right worse than left, secondary to Charcot-Marysol-Tooth disease.      HISTORY OF PRESENT ILLNESS:  Ms. Duarte is a 23-year-old female who presents in the company of her mother for evaluation of her feet.  The patient reports to have a pronounced foot drop which is quite debilitating to her and interferes with her activities of daily living.  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  Reports to somewhat get by on a daily basis but to struggle with an increased level of activities like traveling or keeping up with her friends.      The patient has already tried a number of braces, although she has not had an AFO.  Reports to be somewhat frustrated by her inability to function.  The patient has a strong interest on approaching her feet from a surgical point of view.      PAST MEDICAL HISTORY:   1. Charcot-Marysol-Tooth disease, as mentioned above.   2. Social anxiety.   3. ADHD.      PAST SURGICAL HISTORY:  Reviewed today.      DRUG ALLERGIES:  None.      CURRENT MEDICATIONS:  Please refer to encounter form.      PHYSICAL EXAMINATION:  On today's visit, she presents as a pleasant female in no apparent distress in the company of her mother.  Presents with a height of 5 feet 1 inch and a weight of 125 pounds.  Denies to have any constitutional symptoms.      At today's visit, she presents, in fact, with a nice alignment with a slight valgus of her hindfoot.  Presents with equinus contracture as she has no dorsiflexion past neutral.      Presents with absence of any active contraction of the anterior tibialis, EHL and EDL tendons and  presents with 4/5 for posterior tibia and peroneals.  Achilles is 5/5.  The foot is somewhat supple.      RADIOGRAPHIC EVALUATION:  Plain x-rays were reviewed today which were grossly nondiagnostic.  There are no acute findings.      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      PLAN:  I discussed with the patient and her mother that at this point the options are to proceed with the use of an AFO, something that she has not tried up to this point versus a posterior tibialis tendon transfer to the lateral cuneiform.  I discussed with her that given the fact that she has some already intrinsic weakness most likely we will have just some dynamic stabilizer of the foot, and otherwise, I am not sure that she will have any active dorsiflexion of the foot after surgery.  I believe the best-guess scenario is she will have a foot in a neutral alignment that she will be able to plantarflex and it will bounce back into neutral alignment ___ (?) once she releases the contracture of her Achilles tendon.      I discussed with her the most likely postoperative course and complications from such intervention, which include but are not limited to infection, bleeding, nerve damage, residual pain, stiffness and weakness.      All questions were answered.  The patient would like to do some thinking and she will contact us if she wishes to proceed with surgery, which again will consist of a posterior tibialis tendon transfer with an Achilles tendon lengthening.      TT 30 minutes, CT 20 minutes.     Again, thank you for allowing me to participate in the care of your patient.      Sincerely,    Eyal Hickman MD

## 2019-04-23 NOTE — PROGRESS NOTES
CHIEF COMPLAINT:  Bilateral foot drop, right worse than left, secondary to Charcot-Marysol-Tooth disease.      HISTORY OF PRESENT ILLNESS:  Ms. Duarte is a 23-year-old female who presents in the company of her mother for evaluation of her feet.  The patient reports to have a pronounced foot drop which is quite debilitating to her and interferes with her activities of daily living.  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  Reports to somewhat get by on a daily basis but to struggle with an increased level of activities like traveling or keeping up with her friends.      The patient has already tried a number of braces, although she has not had an AFO.  Reports to be somewhat frustrated by her inability to function.  The patient has a strong interest on approaching her feet from a surgical point of view.      PAST MEDICAL HISTORY:   1. Charcot-Marysol-Tooth disease, as mentioned above.   2. Social anxiety.   3. ADHD.      PAST SURGICAL HISTORY:  Reviewed today.      DRUG ALLERGIES:  None.      CURRENT MEDICATIONS:  Please refer to encounter form.      PHYSICAL EXAMINATION:  On today's visit, she presents as a pleasant female in no apparent distress in the company of her mother.  Presents with a height of 5 feet 1 inch and a weight of 125 pounds.  Denies to have any constitutional symptoms.      At today's visit, she presents, in fact, with a nice alignment with a slight valgus of her hindfoot.  Presents with equinus contracture as she has no dorsiflexion past neutral.      Presents with absence of any active contraction of the anterior tibialis, EHL and EDL tendons and presents with 4/5 for posterior tibia and peroneals.  Achilles is 5/5.  The foot is somewhat supple.      RADIOGRAPHIC EVALUATION:  Plain x-rays were reviewed today which were grossly nondiagnostic.  There are no acute findings.      ASSESSMENT:  Right foot drop secondary to Charcot-Marysol-Tooth  disease.      PLAN:  I discussed with the patient and her mother that at this point the options are to proceed with the use of an AFO, something that she has not tried up to this point versus a posterior tibialis tendon transfer to the lateral cuneiform.  I discussed with her that given the fact that she has some already intrinsic weakness most likely we will have just some dynamic stabilizer of the foot, and otherwise, I am not sure that she will have any active dorsiflexion of the foot after surgery.  I believe the best-guess scenario is she will have a foot in a neutral alignment that she will be able to plantarflex and it will bounce back into neutral alignment ___ (?) once she releases the contracture of her Achilles tendon.      I discussed with her the most likely postoperative course and complications from such intervention, which include but are not limited to infection, bleeding, nerve damage, residual pain, stiffness and weakness.      All questions were answered.  The patient would like to do some thinking and she will contact us if she wishes to proceed with surgery, which again will consist of a posterior tibialis tendon transfer with an Achilles tendon lengthening.      TT 30 minutes, CT 20 minutes.

## 2019-04-23 NOTE — NURSING NOTE
"Reason For Visit:   Chief Complaint   Patient presents with     Consult     right foot pain        Ht 1.549 m (5' 1\")   Wt 56.7 kg (125 lb)   BMI 23.62 kg/m      Pain Assessment  Patient Currently in Pain: Yes  0-10 Pain Scale: 2  Primary Pain Location: Foot  Pain Descriptors: Numbness  Alleviating Factors: Rest  Aggravating Factors: Walking    Kristyn Grissom ATC    "

## 2019-04-24 NOTE — RESULT ENCOUNTER NOTE
Please advise Donald Duarte,  1995, that her xray results were normal healed. No follow up needed.   [phone] (home)   Thank you  Nellie Wang CNP

## 2019-04-25 ENCOUNTER — TELEPHONE (OUTPATIENT)
Dept: FAMILY MEDICINE | Facility: OTHER | Age: 24
End: 2019-04-25

## 2019-04-25 NOTE — TELEPHONE ENCOUNTER
Piper RUIZ (R) contacted Donald on 19 and left a message. If patient calls back please inform patient of results below, thanks    Please advise Donald Duarte,  1995, that her xray results were normal healed. No follow up needed.   [phone] (home)   Thank you   Nellie Wang CNP

## 2019-07-29 ENCOUNTER — TELEPHONE (OUTPATIENT)
Dept: FAMILY MEDICINE | Facility: OTHER | Age: 24
End: 2019-07-29

## 2019-07-29 DIAGNOSIS — G60.0 CHARCOT-MARIE DISEASE: Primary | ICD-10-CM

## 2019-07-29 NOTE — TELEPHONE ENCOUNTER
Reason for Call:  Other Please Fax over the referral for Dr. Eyal Hickman orthropedic surgeon. [phone]  Phone [phone]    Detailed comments: none    Phone Number Patient can be reached at: Home number on file [phone] (home)    Best Time: anytime    Can we leave a detailed message on this number? YES    Call taken on 7/29/2019 at 8:50 AM by Abdullahi James

## 2019-08-07 DIAGNOSIS — F90.2 ATTENTION DEFICIT HYPERACTIVITY DISORDER (ADHD), COMBINED TYPE: ICD-10-CM

## 2019-08-07 RX ORDER — DEXTROAMPHETAMINE SACCHARATE, AMPHETAMINE ASPARTATE MONOHYDRATE, DEXTROAMPHETAMINE SULFATE AND AMPHETAMINE SULFATE 5; 5; 5; 5 MG/1; MG/1; MG/1; MG/1
20 CAPSULE, EXTENDED RELEASE ORAL DAILY
Qty: 30 CAPSULE | Refills: 0 | OUTPATIENT
Start: 2019-08-07

## 2019-08-07 NOTE — TELEPHONE ENCOUNTER
Pending Prescriptions:                       Disp   Refills    amphetamine-dextroamphetamine (ADDERALL XR*30 cap*0        Sig: Take 1 capsule (20 mg) by mouth daily    Routing refill request to provider for review/approval because:  Drug not on the [facility] refill protocol

## 2019-08-07 NOTE — TELEPHONE ENCOUNTER
"Patient need ov for adderall refills per last ov 1/2/19 \"Refill given for 3 mos adderall XR 20 mg may have additional 3 mos in March then will need to return to clinic     Thank you  Nellie Wang CNP    "

## 2019-08-07 NOTE — PROGRESS NOTES
Subjective     Donald Duarte is a 24 year old female who presents to clinic today for the following health issues:    HPI     SUBJECTIVE:  Patient is here today to recheck ADHD/ADD.    Updates since last visit:   Routine for taking medicine, including time: 7 am   Time medicine wears off: 1 pm   Issues at school/Work: none   Issues at home: none   Control of symptoms: not working as well as usual. Doesn't feel as focused as when she first started taking it.     Side effects:  Headaches: No  Stomach aches: No  Irritability/mood swings: No  Difficulties with sleep: No  Social withdrawal: No  Unusual movements/tics: No  Decreased appetite: No    When pt doesn't take her medication she gets nightmares and is fatigued.    She is working a lot more and taking online classes. She feels the adderral is wearing off early.   Is also c/o bilateral wrist pain this is waking her up at night with hand numbness. She is on computer frequently for school.         Patient Active Problem List   Diagnosis     DENISE (generalized anxiety disorder)     Steppage gait     Pes valgus, unspecified laterality     Attention deficit hyperactivity disorder (ADHD), combined type     Presence of intrauterine contraceptive device     Tachycardia     Social anxiety disorder     Past Surgical History:   Procedure Laterality Date     DENTAL SURGERY      wisdom teeth       Social History     Tobacco Use     Smoking status: Never Smoker     Smokeless tobacco: Never Used   Substance Use Topics     Alcohol use: No     Family History   Problem Relation Age of Onset     Hypertension Mother      Arthritis Paternal Grandmother         lupus     Breast Cancer Paternal Aunt          Current Outpatient Medications   Medication Sig Dispense Refill     amphetamine-dextroamphetamine (ADDERALL XR) 20 MG 24 hr capsule Take 1 capsule (20 mg) by mouth daily 30 capsule 0     [START ON 9/9/2019] amphetamine-dextroamphetamine (ADDERALL XR) 20 MG 24 hr capsule Take 1  capsule (20 mg) by mouth daily 30 capsule 0     [START ON 10/10/2019] amphetamine-dextroamphetamine (ADDERALL XR) 20 MG 24 hr capsule Take 1 capsule (20 mg) by mouth daily 30 capsule 0     amphetamine-dextroamphetamine (ADDERALL) 10 MG tablet Take 1 tablet (10 mg) by mouth daily 30 tablet 0     [START ON 9/9/2019] amphetamine-dextroamphetamine (ADDERALL) 10 MG tablet Take 1 tablet (10 mg) by mouth daily 30 tablet 0     [START ON 10/10/2019] amphetamine-dextroamphetamine (ADDERALL) 10 MG tablet Take 1 tablet (10 mg) by mouth daily 30 tablet 0     levonorgestrel (MIRENA) 20 MCG/24HR IUD 1 each (20 mcg) by Intrauterine route continuous       loratadine (CLARITIN) 10 MG tablet Take 1 tablet (10 mg) by mouth daily 30 tablet 1     Phenylephrine-Polyvinyl Alc (REFRESH REDNESS RELIEF) 0.12-1.4 % SOLN Apply 1 drop to eye 2 times daily as needed 1 Bottle 0     Allergies   Allergen Reactions     No Known Allergies      Recent Labs   Lab Test 01/31/18  1532   CR 0.66   GFRESTIMATED >90   GFRESTBLACK >90   POTASSIUM 3.9   TSH 0.90      BP Readings from Last 3 Encounters:   08/09/19 116/70   03/20/19 118/70   01/02/19 112/68    Wt Readings from Last 3 Encounters:   08/09/19 58.2 kg (128 lb 6.4 oz)   04/23/19 56.7 kg (125 lb)   03/20/19 55.8 kg (123 lb)                    Reviewed and updated as needed this visit by Provider         Review of Systems   ROS COMP: Constitutional, HEENT, cardiovascular, pulmonary, GI, , musculoskeletal, neuro, skin, endocrine and psych systems are negative, except as otherwise noted.      Objective    There were no vitals taken for this visit.  There is no height or weight on file to calculate BMI.  Physical Exam   GENERAL: healthy, alert and no distress  NECK: no adenopathy, no asymmetry, masses, or scars and thyroid normal to palpation  RESP: lungs clear to auscultation - no rales, rhonchi or wheezes  CV: regular rate and rhythm, normal S1 S2, no S3 or S4, no murmur, click or rub, no peripheral  edema and peripheral pulses strong  MS: Wrist Exam: WRIST:  Inspection: no swelling  no effusion  Palpation: Tender: no tenderness.  Range of Motion: normal  Strength: no deficits  Special tests: positive Tinel's at carpal tunnel.      ELBOW: normal exam    Cervical Spine Exam: Inspection: normal cervical lordosis, no scapular winging  Tender:  none  Range of Motion:  Full ROM of cervical spine  Strength: Full strength of all neck muscles    SKIN: no suspicious lesions or rashes  PSYCH: mentation appears normal, affect normal/bright    Assessment & Plan     1. Attention deficit hyperactivity disorder (ADHD), combined type  Will add 10 mg ir to afternoon to see if this helps concentration. She will take xr at 7 and ir at 1-2 pm as needed discussed she can hold afternoon if she feels this is not needed.   - amphetamine-dextroamphetamine (ADDERALL XR) 20 MG 24 hr capsule; Take 1 capsule (20 mg) by mouth daily  Dispense: 30 capsule; Refill: 0  - amphetamine-dextroamphetamine (ADDERALL XR) 20 MG 24 hr capsule; Take 1 capsule (20 mg) by mouth daily  Dispense: 30 capsule; Refill: 0  - amphetamine-dextroamphetamine (ADDERALL XR) 20 MG 24 hr capsule; Take 1 capsule (20 mg) by mouth daily  Dispense: 30 capsule; Refill: 0  - amphetamine-dextroamphetamine (ADDERALL) 10 MG tablet; Take 1 tablet (10 mg) by mouth daily  Dispense: 30 tablet; Refill: 0  - amphetamine-dextroamphetamine (ADDERALL) 10 MG tablet; Take 1 tablet (10 mg) by mouth daily  Dispense: 30 tablet; Refill: 0  - amphetamine-dextroamphetamine (ADDERALL) 10 MG tablet; Take 1 tablet (10 mg) by mouth daily  Dispense: 30 tablet; Refill: 0    2. Bilateral carpal tunnel syndrome  Will have her use splinting. Discussed return to clinic in 6 weeks. Handout given and reviewed.          Patient Instructions     Patient Education     Carpal Tunnel Syndrome    Carpal tunnel syndrome is a painful condition of the wrist and arm. It is caused by pressure on the median nerve. The  median nerve is one of the nerves that give feeling and movement to the hand. It passes through a tunnel in the wrist called the carpal tunnel. This tunnel is made up of bones and ligaments. Narrowing of this tunnel or swelling of the tissues inside the tunnel puts pressure on the median nerve. This causes numbness, pins and needles, or electric shooting pains in your hand and forearm. Often the pain is worse at night and may wake you when you are asleep.  Carpal tunnel syndrome may occur during pregnancy and with use of birth control pills. It is more common in workers who must often bend their wrists. It is also common in people who work with power tools that cause strong vibrations.  Home care    Rest the painful wrist. Avoid repeated bending of the wrist back and forth. This puts pressure on the median nerve. Avoid using power tools with strong vibrations.    If you were given a splint, wear it at night while you sleep. You may also wear it during the day for comfort.    Move your fingers and wrists often to prevent stiffness.    Elevate your arms on pillows when you lie down.    Try using the unaffected hand more.    Try not to hold your wrists in a bent, downward position.    Sometimes changes in the work place may ease symptoms. If you type most of the day, it may help to change the position of your keyboard or add a wrist support. Your wrist should be in a neutral position and not bent back when typing.    You may use over-the-counter pain medicine to treat pain and inflammation, unless another medicine was prescribed. Anti-inflammatory pain medicines, such as ibuprofen or naproxen may be more effective than acetaminophen, which treats pain, but not inflammation. If you have chronic liver or kidney disease or ever had a stomach ulcer or gastrointestinal bleeding, talk with your healthcare provider before using these medicines.    Opioid pain medicine will only give temporary relief and does not treat the  problem. If pain continues, you may need a shot of a steroid drug into your wrist.    If the above methods fail, you may need surgery. This will open the carpal tunnel and release the pressure on the trapped nerve.  Follow-up care  Follow up with your healthcare provider, or as advised. If X-rays were taken, you will be notified of any new findings that may affect your care.  When to seek medical advice  Call your healthcare provider right away if any of these occur:    Pain not improving with the above treatment    Fingers or hand become cold, blue, numb, or tingly    Your whole arm becomes swollen or weak  Date Last Reviewed: 5/1/2018 2000-2018 [email or web address]. [address] [place] [address], [place] [address]. All rights reserved. This information is not intended as a substitute for professional medical care. Always follow your healthcare professional's instructions.                 JAMES Rivas [facility] (formerly [facility])

## 2019-08-09 ENCOUNTER — OFFICE VISIT (OUTPATIENT)
Dept: FAMILY MEDICINE | Facility: OTHER | Age: 24
End: 2019-08-09
Payer: COMMERCIAL

## 2019-08-09 VITALS
SYSTOLIC BLOOD PRESSURE: 116 MMHG | OXYGEN SATURATION: 98 % | TEMPERATURE: 98.4 F | HEIGHT: 61 IN | WEIGHT: 128.4 LBS | RESPIRATION RATE: 16 BRPM | DIASTOLIC BLOOD PRESSURE: 70 MMHG | HEART RATE: 90 BPM | BODY MASS INDEX: 24.24 KG/M2

## 2019-08-09 DIAGNOSIS — G56.03 BILATERAL CARPAL TUNNEL SYNDROME: ICD-10-CM

## 2019-08-09 DIAGNOSIS — F90.2 ATTENTION DEFICIT HYPERACTIVITY DISORDER (ADHD), COMBINED TYPE: Primary | ICD-10-CM

## 2019-08-09 PROCEDURE — 99213 OFFICE O/P EST LOW 20 MIN: CPT | Performed by: NURSE PRACTITIONER

## 2019-08-09 RX ORDER — DEXTROAMPHETAMINE SACCHARATE, AMPHETAMINE ASPARTATE MONOHYDRATE, DEXTROAMPHETAMINE SULFATE AND AMPHETAMINE SULFATE 5; 5; 5; 5 MG/1; MG/1; MG/1; MG/1
20 CAPSULE, EXTENDED RELEASE ORAL DAILY
Qty: 30 CAPSULE | Refills: 0 | Status: SHIPPED | OUTPATIENT
Start: 2019-09-09 | End: 2019-12-06

## 2019-08-09 RX ORDER — DEXTROAMPHETAMINE SACCHARATE, AMPHETAMINE ASPARTATE, DEXTROAMPHETAMINE SULFATE AND AMPHETAMINE SULFATE 2.5; 2.5; 2.5; 2.5 MG/1; MG/1; MG/1; MG/1
10 TABLET ORAL DAILY
Qty: 30 TABLET | Refills: 0 | Status: SHIPPED | OUTPATIENT
Start: 2019-08-09 | End: 2019-12-06

## 2019-08-09 RX ORDER — DEXTROAMPHETAMINE SACCHARATE, AMPHETAMINE ASPARTATE MONOHYDRATE, DEXTROAMPHETAMINE SULFATE AND AMPHETAMINE SULFATE 5; 5; 5; 5 MG/1; MG/1; MG/1; MG/1
20 CAPSULE, EXTENDED RELEASE ORAL DAILY
Qty: 30 CAPSULE | Refills: 0 | Status: SHIPPED | OUTPATIENT
Start: 2019-10-10 | End: 2019-12-06

## 2019-08-09 RX ORDER — DEXTROAMPHETAMINE SACCHARATE, AMPHETAMINE ASPARTATE MONOHYDRATE, DEXTROAMPHETAMINE SULFATE AND AMPHETAMINE SULFATE 5; 5; 5; 5 MG/1; MG/1; MG/1; MG/1
20 CAPSULE, EXTENDED RELEASE ORAL DAILY
Qty: 30 CAPSULE | Refills: 0 | Status: SHIPPED | OUTPATIENT
Start: 2019-08-09 | End: 2019-12-06

## 2019-08-09 RX ORDER — DEXTROAMPHETAMINE SACCHARATE, AMPHETAMINE ASPARTATE, DEXTROAMPHETAMINE SULFATE AND AMPHETAMINE SULFATE 2.5; 2.5; 2.5; 2.5 MG/1; MG/1; MG/1; MG/1
10 TABLET ORAL DAILY
Qty: 30 TABLET | Refills: 0 | Status: SHIPPED | OUTPATIENT
Start: 2019-09-09 | End: 2019-12-06

## 2019-08-09 RX ORDER — DEXTROAMPHETAMINE SACCHARATE, AMPHETAMINE ASPARTATE, DEXTROAMPHETAMINE SULFATE AND AMPHETAMINE SULFATE 2.5; 2.5; 2.5; 2.5 MG/1; MG/1; MG/1; MG/1
10 TABLET ORAL DAILY
Qty: 30 TABLET | Refills: 0 | Status: SHIPPED | OUTPATIENT
Start: 2019-10-10 | End: 2019-12-06

## 2019-08-09 ASSESSMENT — MIFFLIN-ST. JEOR: SCORE: 1274.8

## 2019-08-09 NOTE — PATIENT INSTRUCTIONS
Patient Education     Carpal Tunnel Syndrome    Carpal tunnel syndrome is a painful condition of the wrist and arm. It is caused by pressure on the median nerve. The median nerve is one of the nerves that give feeling and movement to the hand. It passes through a tunnel in the wrist called the carpal tunnel. This tunnel is made up of bones and ligaments. Narrowing of this tunnel or swelling of the tissues inside the tunnel puts pressure on the median nerve. This causes numbness, pins and needles, or electric shooting pains in your hand and forearm. Often the pain is worse at night and may wake you when you are asleep.  Carpal tunnel syndrome may occur during pregnancy and with use of birth control pills. It is more common in workers who must often bend their wrists. It is also common in people who work with power tools that cause strong vibrations.  Home care    Rest the painful wrist. Avoid repeated bending of the wrist back and forth. This puts pressure on the median nerve. Avoid using power tools with strong vibrations.    If you were given a splint, wear it at night while you sleep. You may also wear it during the day for comfort.    Move your fingers and wrists often to prevent stiffness.    Elevate your arms on pillows when you lie down.    Try using the unaffected hand more.    Try not to hold your wrists in a bent, downward position.    Sometimes changes in the work place may ease symptoms. If you type most of the day, it may help to change the position of your keyboard or add a wrist support. Your wrist should be in a neutral position and not bent back when typing.    You may use over-the-counter pain medicine to treat pain and inflammation, unless another medicine was prescribed. Anti-inflammatory pain medicines, such as ibuprofen or naproxen may be more effective than acetaminophen, which treats pain, but not inflammation. If you have chronic liver or kidney disease or ever had a stomach ulcer or  gastrointestinal bleeding, talk with your healthcare provider before using these medicines.    Opioid pain medicine will only give temporary relief and does not treat the problem. If pain continues, you may need a shot of a steroid drug into your wrist.    If the above methods fail, you may need surgery. This will open the carpal tunnel and release the pressure on the trapped nerve.  Follow-up care  Follow up with your healthcare provider, or as advised. If X-rays were taken, you will be notified of any new findings that may affect your care.  When to seek medical advice  Call your healthcare provider right away if any of these occur:    Pain not improving with the above treatment    Fingers or hand become cold, blue, numb, or tingly    Your whole arm becomes swollen or weak  Date Last Reviewed: 5/1/2018 2000-2018 [employer]. [address] [place] [address], [place] [address]. All rights reserved. This information is not intended as a substitute for professional medical care. Always follow your healthcare professional's instructions.

## 2019-10-01 NOTE — TELEPHONE ENCOUNTER
Please contact pharmacy and approve early fill of Adderall XR 20 mg and Adderall IR 10 mg for this Saturday.     Courtney Saab PA-C  Covering for Nellie Wang

## 2019-10-01 NOTE — TELEPHONE ENCOUNTER
Spoke to pharmacist and advised him of message below. Pharmacist verbalized understanding. No further questions.

## 2019-10-01 NOTE — TELEPHONE ENCOUNTER
Reason for Call:  Medication or medication refill:    Do you use a [facility]?  Name of the pharmacy and phone number for the current request:  [facility] - [phone]    Name of the medication requested: amphetamine-dextroamphetamine (ADDERALL XR) 20 MG AND amphetamine-dextroamphetamine (ADDERALL) 10 MG tablet     Other request: Patient is going out of town on Sunday and is requesting to get her prescription filled early , Saturday 10/5/19. Please Advise.     Can we leave a detailed message on this number? YES    Phone number patient can be reached at: Cell number on file:    Telephone Information:   Mobile [phone]       Best Time: any    Call taken on 10/1/2019 at 11:36 AM by Rafael Gasca

## 2019-11-22 DIAGNOSIS — F90.2 ATTENTION DEFICIT HYPERACTIVITY DISORDER (ADHD), COMBINED TYPE: ICD-10-CM

## 2019-11-22 RX ORDER — DEXTROAMPHETAMINE SULFATE, DEXTROAMPHETAMINE SACCHARATE, AMPHETAMINE SULFATE AND AMPHETAMINE ASPARTATE 5; 5; 5; 5 MG/1; MG/1; MG/1; MG/1
CAPSULE, EXTENDED RELEASE ORAL
Qty: 30 CAPSULE | Refills: 0 | OUTPATIENT
Start: 2019-11-22

## 2019-11-22 RX ORDER — DEXTROAMPHETAMINE SACCHARATE, AMPHETAMINE ASPARTATE, DEXTROAMPHETAMINE SULFATE AND AMPHETAMINE SULFATE 2.5; 2.5; 2.5; 2.5 MG/1; MG/1; MG/1; MG/1
TABLET ORAL
Qty: 30 TABLET | Refills: 0 | OUTPATIENT
Start: 2019-11-22

## 2019-11-22 NOTE — TELEPHONE ENCOUNTER
Ov or phone visit needed for refills as there was change last ov 8/9/19    Thank you  Nellie Wang CNP

## 2019-11-22 NOTE — TELEPHONE ENCOUNTER
Pending Prescriptions:                       Disp   Refills    amphetamine-dextroamphetamine (ADDERALL) *30 tab*0            Sig: TAKE ONE TABLET BY MOUTH ONE TIME DAILY     Last Written Prescription Date:  10/10/19  Last Fill Quantity: 30,   # refills: 0  Last Office Visit: 8/9/19  Future Office visit:       Routing refill request to provider for review/approval because:  Drug not on the [facility], [facility] or [facility] refill protocol or controlled substance          ADDERALL XR 20 MG 24 hr capsule [Pharmacy*30 cap*0            Sig: TAKE ONE CAPSULE BY MOUTH ONE TIME DAILY     Last Written Prescription Date:  10/10/19  Last Fill Quantity: 30,   # refills: 0  Last Office Visit: 8/9/19  Future Office visit:       Routing refill request to provider for review/approval because:  Drug not on the [facility], [facility] or [facility] refill protocol or controlled substance    More Caceres, RN, BSN

## 2019-11-22 NOTE — LETTER
[address] [address] [phone]          November 26, 2019    Donald Duarte                                                                                                                     [address] [facility] [address]            Dear Donald,    We have tried to contact you by phone but have been unable to connect with you. We wanted to let you know that we would need to see you for an office visit or a phone visit to refill your Adderall since we changed it over the summer.     If you have any questions please call us at [phone].    Sincerely,     [facility]

## 2019-11-27 NOTE — PROGRESS NOTES
Subjective     Donald Duarte is a 24 year old female who presents to clinic today for the following health issues:    HPI     SUBJECTIVE:    Updates since last visit: Depression symptoms PHQ 9 completed    Routine for taking medicine, including time: 20mg at 4:30 am, second pill around 11:30  Time medicine wears off: 4 pm   Issues at school/Work: none   Issues at home: none   Control of symptoms: well controlled, sleeping better     Side effects:  Headaches: No  Stomach aches: No  Irritability/mood swings: No  Difficulties with sleep: No  Social withdrawal: No  Unusual movements/tics: No  Decreased appetite: No    Other concerns: none      Patient Active Problem List   Diagnosis     DENISE (generalized anxiety disorder)     Steppage gait     Pes valgus, unspecified laterality     Attention deficit hyperactivity disorder (ADHD), combined type     Presence of intrauterine contraceptive device     Tachycardia     Social anxiety disorder     Past Surgical History:   Procedure Laterality Date     DENTAL SURGERY      wisdom teeth       Social History     Tobacco Use     Smoking status: Never Smoker     Smokeless tobacco: Never Used   Substance Use Topics     Alcohol use: No     Family History   Problem Relation Age of Onset     Hypertension Mother      Arthritis Paternal Grandmother         lupus     Breast Cancer Paternal Aunt          Current Outpatient Medications   Medication Sig Dispense Refill     amphetamine-dextroamphetamine (ADDERALL XR) 20 MG 24 hr capsule Take 1 capsule (20 mg) by mouth daily 30 capsule 0     [START ON 1/6/2020] amphetamine-dextroamphetamine (ADDERALL XR) 20 MG 24 hr capsule Take 1 capsule (20 mg) by mouth daily 30 capsule 0     [START ON 2/6/2020] amphetamine-dextroamphetamine (ADDERALL XR) 20 MG 24 hr capsule Take 1 capsule (20 mg) by mouth daily 30 capsule 0     amphetamine-dextroamphetamine (ADDERALL) 10 MG tablet Take 1 tablet (10 mg) by mouth daily 30 tablet 0     [START ON 1/6/2020]  amphetamine-dextroamphetamine (ADDERALL) 10 MG tablet Take 1 tablet (10 mg) by mouth daily 30 tablet 0     [START ON 2/6/2020] amphetamine-dextroamphetamine (ADDERALL) 10 MG tablet Take 1 tablet (10 mg) by mouth daily 30 tablet 0     levonorgestrel (MIRENA) 20 MCG/24HR IUD 1 each (20 mcg) by Intrauterine route continuous       loratadine (CLARITIN) 10 MG tablet Take 1 tablet (10 mg) by mouth daily 30 tablet 1     Phenylephrine-Polyvinyl Alc (REFRESH REDNESS RELIEF) 0.12-1.4 % SOLN Apply 1 drop to eye 2 times daily as needed 1 Bottle 0     Allergies   Allergen Reactions     No Known Allergies      Recent Labs   Lab Test 01/31/18  1532   CR 0.66   GFRESTIMATED >90   GFRESTBLACK >90   POTASSIUM 3.9   TSH 0.90      BP Readings from Last 3 Encounters:   12/06/19 118/70   08/09/19 116/70   03/20/19 118/70    Wt Readings from Last 3 Encounters:   12/06/19 58.1 kg (128 lb)   08/09/19 58.2 kg (128 lb 6.4 oz)   04/23/19 56.7 kg (125 lb)                    Reviewed and updated as needed this visit by Provider         Review of Systems   ROS COMP: Constitutional, HEENT, cardiovascular, pulmonary, GI, , musculoskeletal, neuro, skin, endocrine and psych systems are negative, except as otherwise noted.      Objective    /70   Pulse 88   Temp 98.3  F (36.8  C) (Temporal)   Resp 16   Wt 58.1 kg (128 lb)   SpO2 100%   BMI 24.19 kg/m    Body mass index is 24.19 kg/m .  Physical Exam   GENERAL: healthy, alert and no distress  NECK: no adenopathy, no asymmetry, masses, or scars and thyroid normal to palpation  RESP: lungs clear to auscultation - no rales, rhonchi or wheezes  CV: regular rate and rhythm, normal S1 S2, no S3 or S4, no murmur, click or rub, no peripheral edema and peripheral pulses strong  PSYCH: mentation appears normal, affect normal/bright      Assessment & Plan     1. Attention deficit hyperactivity disorder (ADHD), combined type  Stable she is doing well on current dosing 3 mos ago added 10 mg IR will  return to clinic in 6 mos may have phone visit in 3 mos.   - amphetamine-dextroamphetamine (ADDERALL XR) 20 MG 24 hr capsule; Take 1 capsule (20 mg) by mouth daily  Dispense: 30 capsule; Refill: 0  - amphetamine-dextroamphetamine (ADDERALL XR) 20 MG 24 hr capsule; Take 1 capsule (20 mg) by mouth daily  Dispense: 30 capsule; Refill: 0  - amphetamine-dextroamphetamine (ADDERALL XR) 20 MG 24 hr capsule; Take 1 capsule (20 mg) by mouth daily  Dispense: 30 capsule; Refill: 0  - amphetamine-dextroamphetamine (ADDERALL) 10 MG tablet; Take 1 tablet (10 mg) by mouth daily  Dispense: 30 tablet; Refill: 0  - amphetamine-dextroamphetamine (ADDERALL) 10 MG tablet; Take 1 tablet (10 mg) by mouth daily  Dispense: 30 tablet; Refill: 0  - amphetamine-dextroamphetamine (ADDERALL) 10 MG tablet; Take 1 tablet (10 mg) by mouth daily  Dispense: 30 tablet; Refill: 0       Patient Instructions   Please follow up in 3 mos this can be a phone visit. You will need an office visit in 6 mos.     Thank you      JAMES Rivas [facility]

## 2019-12-06 ENCOUNTER — OFFICE VISIT (OUTPATIENT)
Dept: FAMILY MEDICINE | Facility: OTHER | Age: 24
End: 2019-12-06
Payer: COMMERCIAL

## 2019-12-06 VITALS
DIASTOLIC BLOOD PRESSURE: 70 MMHG | TEMPERATURE: 98.3 F | RESPIRATION RATE: 16 BRPM | OXYGEN SATURATION: 100 % | HEART RATE: 88 BPM | WEIGHT: 128 LBS | BODY MASS INDEX: 24.19 KG/M2 | SYSTOLIC BLOOD PRESSURE: 118 MMHG

## 2019-12-06 DIAGNOSIS — Z23 NEED FOR PROPHYLACTIC VACCINATION AND INOCULATION AGAINST INFLUENZA: ICD-10-CM

## 2019-12-06 DIAGNOSIS — F90.2 ATTENTION DEFICIT HYPERACTIVITY DISORDER (ADHD), COMBINED TYPE: Primary | ICD-10-CM

## 2019-12-06 PROCEDURE — 90686 IIV4 VACC NO PRSV 0.5 ML IM: CPT | Performed by: NURSE PRACTITIONER

## 2019-12-06 PROCEDURE — 90471 IMMUNIZATION ADMIN: CPT | Performed by: NURSE PRACTITIONER

## 2019-12-06 PROCEDURE — 99213 OFFICE O/P EST LOW 20 MIN: CPT | Mod: 25 | Performed by: NURSE PRACTITIONER

## 2019-12-06 RX ORDER — DEXTROAMPHETAMINE SACCHARATE, AMPHETAMINE ASPARTATE, DEXTROAMPHETAMINE SULFATE AND AMPHETAMINE SULFATE 2.5; 2.5; 2.5; 2.5 MG/1; MG/1; MG/1; MG/1
10 TABLET ORAL DAILY
Qty: 30 TABLET | Refills: 0 | Status: SHIPPED | OUTPATIENT
Start: 2020-01-06 | End: 2020-03-24

## 2019-12-06 RX ORDER — DEXTROAMPHETAMINE SACCHARATE, AMPHETAMINE ASPARTATE, DEXTROAMPHETAMINE SULFATE AND AMPHETAMINE SULFATE 2.5; 2.5; 2.5; 2.5 MG/1; MG/1; MG/1; MG/1
10 TABLET ORAL DAILY
Qty: 30 TABLET | Refills: 0 | Status: SHIPPED | OUTPATIENT
Start: 2020-02-06 | End: 2020-03-24

## 2019-12-06 RX ORDER — DEXTROAMPHETAMINE SACCHARATE, AMPHETAMINE ASPARTATE MONOHYDRATE, DEXTROAMPHETAMINE SULFATE AND AMPHETAMINE SULFATE 5; 5; 5; 5 MG/1; MG/1; MG/1; MG/1
20 CAPSULE, EXTENDED RELEASE ORAL DAILY
Qty: 30 CAPSULE | Refills: 0 | Status: SHIPPED | OUTPATIENT
Start: 2020-02-06 | End: 2020-03-24

## 2019-12-06 RX ORDER — DEXTROAMPHETAMINE SACCHARATE, AMPHETAMINE ASPARTATE MONOHYDRATE, DEXTROAMPHETAMINE SULFATE AND AMPHETAMINE SULFATE 5; 5; 5; 5 MG/1; MG/1; MG/1; MG/1
20 CAPSULE, EXTENDED RELEASE ORAL DAILY
Qty: 30 CAPSULE | Refills: 0 | Status: SHIPPED | OUTPATIENT
Start: 2020-01-06 | End: 2020-03-24

## 2019-12-06 RX ORDER — DEXTROAMPHETAMINE SACCHARATE, AMPHETAMINE ASPARTATE, DEXTROAMPHETAMINE SULFATE AND AMPHETAMINE SULFATE 2.5; 2.5; 2.5; 2.5 MG/1; MG/1; MG/1; MG/1
10 TABLET ORAL DAILY
Qty: 30 TABLET | Refills: 0 | Status: SHIPPED | OUTPATIENT
Start: 2019-12-06 | End: 2020-03-24

## 2019-12-06 RX ORDER — DEXTROAMPHETAMINE SACCHARATE, AMPHETAMINE ASPARTATE MONOHYDRATE, DEXTROAMPHETAMINE SULFATE AND AMPHETAMINE SULFATE 5; 5; 5; 5 MG/1; MG/1; MG/1; MG/1
20 CAPSULE, EXTENDED RELEASE ORAL DAILY
Qty: 30 CAPSULE | Refills: 0 | Status: SHIPPED | OUTPATIENT
Start: 2019-12-06 | End: 2020-03-24

## 2019-12-06 NOTE — PATIENT INSTRUCTIONS
Please follow up in 3 mos this can be a phone visit. You will need an office visit in 6 mos.     Thank you

## 2020-03-23 DIAGNOSIS — F90.2 ATTENTION DEFICIT HYPERACTIVITY DISORDER (ADHD), COMBINED TYPE: ICD-10-CM

## 2020-03-24 RX ORDER — DEXTROAMPHETAMINE SACCHARATE, AMPHETAMINE ASPARTATE MONOHYDRATE, DEXTROAMPHETAMINE SULFATE AND AMPHETAMINE SULFATE 5; 5; 5; 5 MG/1; MG/1; MG/1; MG/1
20 CAPSULE, EXTENDED RELEASE ORAL DAILY
Qty: 30 CAPSULE | Refills: 0 | Status: SHIPPED | OUTPATIENT
Start: 2020-05-25 | End: 2020-07-01

## 2020-03-24 RX ORDER — DEXTROAMPHETAMINE SACCHARATE, AMPHETAMINE ASPARTATE, DEXTROAMPHETAMINE SULFATE AND AMPHETAMINE SULFATE 2.5; 2.5; 2.5; 2.5 MG/1; MG/1; MG/1; MG/1
10 TABLET ORAL DAILY
Qty: 30 TABLET | Refills: 0 | Status: SHIPPED | OUTPATIENT
Start: 2020-05-25 | End: 2020-07-01

## 2020-03-24 RX ORDER — DEXTROAMPHETAMINE SACCHARATE, AMPHETAMINE ASPARTATE, DEXTROAMPHETAMINE SULFATE AND AMPHETAMINE SULFATE 2.5; 2.5; 2.5; 2.5 MG/1; MG/1; MG/1; MG/1
TABLET ORAL
Qty: 30 TABLET | Refills: 0 | OUTPATIENT
Start: 2020-03-24

## 2020-03-24 RX ORDER — DEXTROAMPHETAMINE SACCHARATE, AMPHETAMINE ASPARTATE MONOHYDRATE, DEXTROAMPHETAMINE SULFATE AND AMPHETAMINE SULFATE 5; 5; 5; 5 MG/1; MG/1; MG/1; MG/1
20 CAPSULE, EXTENDED RELEASE ORAL DAILY
Qty: 30 CAPSULE | Refills: 0 | Status: SHIPPED | OUTPATIENT
Start: 2020-04-24 | End: 2020-07-01

## 2020-03-24 RX ORDER — DEXTROAMPHETAMINE SACCHARATE, AMPHETAMINE ASPARTATE, DEXTROAMPHETAMINE SULFATE AND AMPHETAMINE SULFATE 2.5; 2.5; 2.5; 2.5 MG/1; MG/1; MG/1; MG/1
10 TABLET ORAL DAILY
Qty: 30 TABLET | Refills: 0 | Status: SHIPPED | OUTPATIENT
Start: 2020-04-24 | End: 2020-07-01

## 2020-03-24 RX ORDER — DEXTROAMPHETAMINE SACCHARATE, AMPHETAMINE ASPARTATE MONOHYDRATE, DEXTROAMPHETAMINE SULFATE AND AMPHETAMINE SULFATE 5; 5; 5; 5 MG/1; MG/1; MG/1; MG/1
20 CAPSULE, EXTENDED RELEASE ORAL DAILY
Qty: 30 CAPSULE | Refills: 0 | Status: SHIPPED | OUTPATIENT
Start: 2020-03-24 | End: 2020-07-01

## 2020-03-24 RX ORDER — DEXTROAMPHETAMINE SACCHARATE, AMPHETAMINE ASPARTATE, DEXTROAMPHETAMINE SULFATE AND AMPHETAMINE SULFATE 2.5; 2.5; 2.5; 2.5 MG/1; MG/1; MG/1; MG/1
10 TABLET ORAL DAILY
Qty: 30 TABLET | Refills: 0 | Status: SHIPPED | OUTPATIENT
Start: 2020-03-24 | End: 2020-07-01

## 2020-03-24 RX ORDER — DEXTROAMPHETAMINE SULFATE, DEXTROAMPHETAMINE SACCHARATE, AMPHETAMINE SULFATE AND AMPHETAMINE ASPARTATE 5; 5; 5; 5 MG/1; MG/1; MG/1; MG/1
CAPSULE, EXTENDED RELEASE ORAL
Qty: 30 CAPSULE | Refills: 0 | OUTPATIENT
Start: 2020-03-24

## 2020-03-24 NOTE — TELEPHONE ENCOUNTER
Refills sent for Adderall XR 20 mg daily and Adderall 10 mg daily for 3 mos to [facility] in [place]. Will need ov after July 6th 2020 for follow up ADHD    Thank you  Nellie Wang CNP

## 2020-06-30 ENCOUNTER — NURSE TRIAGE (OUTPATIENT)
Dept: NURSING | Facility: CLINIC | Age: 25
End: 2020-06-30

## 2020-06-30 NOTE — PROGRESS NOTES
Subjective     Donald Duarte is a 24 year old female who presents to clinic today for the following health issues:    HPI       Concern - bumps under left side of face (cheek)  Onset: 7 months    Description:   Painful red round and itchy bumps on left     Intensity:     Progression of Symptoms:  worsening    Accompanying Signs & Symptoms:  none    Previous history of similar problem:   none    Precipitating factors:   Worsened by: none    Alleviating factors:  Improved by: none    Therapies Tried and outcome: using facial cleanser    Patient states she has notice small irritated bumps on left lower cheeks she states she has picked at them they are sometimes painful. States do not appear to be acne but resemble.       Would also like to discuss ADHD she is due for follow up. Last ov was 12/6/19     Routine for taking medicine, including time: Adderall XR 20mg at 4:30 am, second pill Adderall 10 mg IR around 11:30  Time medicine wears off: 4 pm   Issues at school/Work: none   Issues at home: none   Control of symptoms: well controlled, sleeping better      Side effects:  Headaches: No  Stomach aches: No  Irritability/mood swings: No  Difficulties with sleep: No  Social withdrawal: No  Unusual movements/tics: No  Decreased appetite: No     Other concerns: none    Patient Active Problem List   Diagnosis     DENISE (generalized anxiety disorder)     Steppage gait     Pes valgus, unspecified laterality     Attention deficit hyperactivity disorder (ADHD), combined type     Presence of intrauterine contraceptive device     Tachycardia     Social anxiety disorder     Past Surgical History:   Procedure Laterality Date     DENTAL SURGERY      wisdom teeth       Social History     Tobacco Use     Smoking status: Never Smoker     Smokeless tobacco: Never Used   Substance Use Topics     Alcohol use: No     Family History   Problem Relation Age of Onset     Hypertension Mother      Arthritis Paternal Grandmother         lupus      Breast Cancer Paternal Aunt          Current Outpatient Medications   Medication Sig Dispense Refill     [START ON 7/8/2020] amphetamine-dextroamphetamine (ADDERALL XR) 20 MG 24 hr capsule Take 1 capsule (20 mg) by mouth daily 30 capsule 0     [START ON 8/8/2020] amphetamine-dextroamphetamine (ADDERALL XR) 20 MG 24 hr capsule Take 1 capsule (20 mg) by mouth daily 30 capsule 0     [START ON 9/8/2020] amphetamine-dextroamphetamine (ADDERALL XR) 20 MG 24 hr capsule Take 1 capsule (20 mg) by mouth daily 30 capsule 0     [START ON 7/8/2020] amphetamine-dextroamphetamine (ADDERALL) 10 MG tablet Take 1 tablet (10 mg) by mouth daily 30 tablet 0     [START ON 8/8/2020] amphetamine-dextroamphetamine (ADDERALL) 10 MG tablet Take 1 tablet (10 mg) by mouth daily 30 tablet 0     [START ON 9/8/2020] amphetamine-dextroamphetamine (ADDERALL) 10 MG tablet Take 1 tablet (10 mg) by mouth daily 30 tablet 0     erythromycin with ethanol (EMGEL) 2 % gel Apply topically 2 times daily 30 g 0     levonorgestrel (MIRENA) 20 MCG/24HR IUD 1 each (20 mcg) by Intrauterine route continuous       loratadine (CLARITIN) 10 MG tablet Take 1 tablet (10 mg) by mouth daily 30 tablet 1     Phenylephrine-Polyvinyl Alc (REFRESH REDNESS RELIEF) 0.12-1.4 % SOLN Apply 1 drop to eye 2 times daily as needed 1 Bottle 0     Allergies   Allergen Reactions     No Known Allergies      Recent Labs   Lab Test 01/31/18  1532   CR 0.66   GFRESTIMATED >90   GFRESTBLACK >90   POTASSIUM 3.9   TSH 0.90      BP Readings from Last 3 Encounters:   07/01/20 114/66   12/06/19 118/70   08/09/19 116/70    Wt Readings from Last 3 Encounters:   07/01/20 59 kg (130 lb)   12/06/19 58.1 kg (128 lb)   08/09/19 58.2 kg (128 lb 6.4 oz)                    Reviewed and updated as needed this visit by Provider         Review of Systems   Constitutional, HEENT, cardiovascular, pulmonary, GI, , musculoskeletal, neuro, skin, endocrine and psych systems are negative, except as otherwise  "noted.      Objective    /66   Pulse 96   Temp 98.2  F (36.8  C) (Oral)   Resp 16   Ht 1.555 m (5' 1.22\")   Wt 59 kg (130 lb)   BMI 24.39 kg/m    Body mass index is 24.39 kg/m .  Physical Exam   GENERAL: healthy, alert and no distress  NECK: no adenopathy, no asymmetry, masses, or scars and thyroid normal to palpation  RESP: lungs clear to auscultation - no rales, rhonchi or wheezes  CV: regular rate and rhythm, normal S1 S2, no S3 or S4, no murmur, click or rub, no peripheral edema and peripheral pulses strong  ABDOMEN: soft, nontender, no hepatosplenomegaly, no masses and bowel sounds normal  MS: no gross musculoskeletal defects noted, no edema  SKIN: 0.5-1 inch 3-4 papules, erythema, flat, dry, irritated.          Assessment & Plan     1. Perioral dermatitis  Discussed etiology of dermatitis recommend topical abx for 4 weeks if not completely cleared may add additional 4 weeks with daily cleanser and continue use of sunscreen to protect skin. Symptoms do not improve or worsen recommend seeing dermatology for further evaluation.   - erythromycin with ethanol (EMGEL) 2 % gel; Apply topically 2 times daily  Dispense: 30 g; Refill: 0    2. Attention deficit hyperactivity disorder (ADHD), combined type  Stable she is doing well on current dosing will return to clinic in 6 mos for follow up may call for RX refill in 3 mos if doing well.   - amphetamine-dextroamphetamine (ADDERALL XR) 20 MG 24 hr capsule; Take 1 capsule (20 mg) by mouth daily  Dispense: 30 capsule; Refill: 0  - amphetamine-dextroamphetamine (ADDERALL XR) 20 MG 24 hr capsule; Take 1 capsule (20 mg) by mouth daily  Dispense: 30 capsule; Refill: 0  - amphetamine-dextroamphetamine (ADDERALL XR) 20 MG 24 hr capsule; Take 1 capsule (20 mg) by mouth daily  Dispense: 30 capsule; Refill: 0  - amphetamine-dextroamphetamine (ADDERALL) 10 MG tablet; Take 1 tablet (10 mg) by mouth daily  Dispense: 30 tablet; Refill: 0  - amphetamine-dextroamphetamine " (ADDERALL) 10 MG tablet; Take 1 tablet (10 mg) by mouth daily  Dispense: 30 tablet; Refill: 0  - amphetamine-dextroamphetamine (ADDERALL) 10 MG tablet; Take 1 tablet (10 mg) by mouth daily  Dispense: 30 tablet; Refill: 0     Home care instructions were reviewed with the patient. The risks, benefits and treatment options of prescribed medications or other treatments have been discussed with the patient. The patient verbalized their understanding and should call or follow up if no improvement or if they develop further problems.      JAMES Rivas [facility]

## 2020-06-30 NOTE — TELEPHONE ENCOUNTER
Patient thought she had pimples on the side of her cheek. States there are multiple spots on the cheek under the skin. States left cheek.   Not painful.  States she tried to pop them, every once in awhile she does get some white out. States they take forever to heal and as soon as 1 goes away more come.   Has been going on for the last 6 months.   Not the same spot, but same area.   Not red in color.   Just looks like bumps under the skin. Itchy.    Patient will schedule appointment either in clinic or virtual. Patient to call back if sx worsen or if any signs of infection.     Alicia Mendez, RN   [facility] RN Triage       Additional Information    Negative: Sounds like a life-threatening emergency to the triager    Negative: Small growth, spot, bump, or pigmented area of skin (e.g., moles, skin tags, wart, melanoma, skin cancer)    Negative: Inguinal hernia previously diagnosed by a physician    Negative: Followed a skin injury    Negative: Follows an insect bite    Negative: Swelling of lymph node suspected    Negative: Swelling of vaccination site    Negative: Swelling of tongue    Negative: Swelling of lip    Negative: Swelling of eye    Negative: Swelling of entire face    Negative: Swelling of scrotum    Negative: Swelling of labia    Negative: Swelling of surgical incision    Negative: Swelling of ankle joint    Negative: Swelling of elbow joint    Negative: Swelling of knee joint    Negative: Swelling with a skin rash    Negative: [1] Sudden onset of rash (within last 2 hours) AND [2] difficulty with breathing or swallowing    Negative: Sounds like a life-threatening emergency to the triager    [1] Pimples (localized) AND [2 ] no improvement after using CARE ADVICE    Negative: Poison ivy, oak, or sumac contact suspected    Negative: Insect bite(s) suspected    Negative: Ringworm suspected (i.e., round pink patch, sometimes looks like ring, usually 1/2 to 1 inch [12-25 mm],  in size, slowly  increasing in size)    Negative: Athlete's Foot suspected (i.e., itchy rash between the toes)    Negative: Jock Itch suspected (i.e., itchy rash on inner thighs near genital area)    Negative: Wound infection suspected (i.e., pain, spreading redness, or pus; in a cut, puncture, scrape or sutured wound)    Negative: Impetigo suspected  (i.e., painless infected superficial small sores, less than 1 inch or 2.5 cm, often covered by a soft, yellow-brown scab or crust; sometimes occurring near nasal openings)    Negative: Shingles suspected (i.e., painful rash, multiple small blisters grouped together in one area of body; dermatomal distribution)    Negative: Rash of external female genital area (vulva)    Negative: Rash of penis or scrotum    Negative: Small spot, skin growth, or mole    Negative: Sores or skin ulcer, not a rash    Negative: Localized lump (or swelling) without redness or rash    Negative: Patient sounds very sick or weak to the triager    Negative: [1] Red area or streak AND [2] fever    Negative: [1] Rash is painful to touch AND [2] fever    Negative: [1] Looks infected (spreading redness, pus) AND [2] large red area (> 2 in. or 5 cm)    Negative: [1] Looks infected (spreading redness, pus) AND [2] diabetes mellitus or weak immune system (e.g., HIV positive, cancer chemo, splenectomy, organ transplant, chronic steroids)    Negative: [1] Localized purple or blood-colored spots or dots AND [2] not from injury or friction AND [3] fever    Negative: [1] Localized purple or blood-colored spots or dots AND [2] not from injury or friction AND [3] no fever    Negative: Looks like a boil, infected sore, deep ulcer or other infected rash    Negative: [1] Looks infected (spreading redness, pus) AND [2] no fever    Negative: [1] Localized rash is very painful AND [2] no fever    Negative: Genital area rash    Negative: Lyme disease suspected (e.g., bull's eye rash or tick bite / exposure)    Negative: [1] Applying  cream or ointment AND [2] causes severe itch, burning or pain    Protocols used: SKIN LUMP OR LOCALIZED SWELLING-A-AH, RASH OR REDNESS - [id number]

## 2020-07-01 ENCOUNTER — OFFICE VISIT (OUTPATIENT)
Dept: FAMILY MEDICINE | Facility: OTHER | Age: 25
End: 2020-07-01
Payer: COMMERCIAL

## 2020-07-01 VITALS
HEIGHT: 61 IN | RESPIRATION RATE: 16 BRPM | DIASTOLIC BLOOD PRESSURE: 66 MMHG | HEART RATE: 96 BPM | BODY MASS INDEX: 24.55 KG/M2 | WEIGHT: 130 LBS | SYSTOLIC BLOOD PRESSURE: 114 MMHG | TEMPERATURE: 98.2 F

## 2020-07-01 DIAGNOSIS — L71.0 PERIORAL DERMATITIS: Primary | ICD-10-CM

## 2020-07-01 DIAGNOSIS — F90.2 ATTENTION DEFICIT HYPERACTIVITY DISORDER (ADHD), COMBINED TYPE: ICD-10-CM

## 2020-07-01 PROCEDURE — 99213 OFFICE O/P EST LOW 20 MIN: CPT | Performed by: NURSE PRACTITIONER

## 2020-07-01 RX ORDER — DEXTROAMPHETAMINE SACCHARATE, AMPHETAMINE ASPARTATE MONOHYDRATE, DEXTROAMPHETAMINE SULFATE AND AMPHETAMINE SULFATE 5; 5; 5; 5 MG/1; MG/1; MG/1; MG/1
20 CAPSULE, EXTENDED RELEASE ORAL DAILY
Qty: 30 CAPSULE | Refills: 0 | Status: SHIPPED | OUTPATIENT
Start: 2020-07-08 | End: 2020-08-07

## 2020-07-01 RX ORDER — ERYTHROMYCIN 20 MG/G
GEL TOPICAL 2 TIMES DAILY
Qty: 30 G | Refills: 0 | Status: SHIPPED | OUTPATIENT
Start: 2020-07-01 | End: 2020-07-31

## 2020-07-01 RX ORDER — DEXTROAMPHETAMINE SACCHARATE, AMPHETAMINE ASPARTATE, DEXTROAMPHETAMINE SULFATE AND AMPHETAMINE SULFATE 2.5; 2.5; 2.5; 2.5 MG/1; MG/1; MG/1; MG/1
10 TABLET ORAL DAILY
Qty: 30 TABLET | Refills: 0 | Status: SHIPPED | OUTPATIENT
Start: 2020-08-08 | End: 2020-09-07

## 2020-07-01 RX ORDER — DEXTROAMPHETAMINE SACCHARATE, AMPHETAMINE ASPARTATE MONOHYDRATE, DEXTROAMPHETAMINE SULFATE AND AMPHETAMINE SULFATE 5; 5; 5; 5 MG/1; MG/1; MG/1; MG/1
20 CAPSULE, EXTENDED RELEASE ORAL DAILY
Qty: 30 CAPSULE | Refills: 0 | Status: SHIPPED | OUTPATIENT
Start: 2020-08-08 | End: 2020-09-07

## 2020-07-01 RX ORDER — DEXTROAMPHETAMINE SACCHARATE, AMPHETAMINE ASPARTATE MONOHYDRATE, DEXTROAMPHETAMINE SULFATE AND AMPHETAMINE SULFATE 5; 5; 5; 5 MG/1; MG/1; MG/1; MG/1
20 CAPSULE, EXTENDED RELEASE ORAL DAILY
Qty: 30 CAPSULE | Refills: 0 | Status: SHIPPED | OUTPATIENT
Start: 2020-09-08 | End: 2020-10-12

## 2020-07-01 RX ORDER — DEXTROAMPHETAMINE SACCHARATE, AMPHETAMINE ASPARTATE, DEXTROAMPHETAMINE SULFATE AND AMPHETAMINE SULFATE 2.5; 2.5; 2.5; 2.5 MG/1; MG/1; MG/1; MG/1
10 TABLET ORAL DAILY
Qty: 30 TABLET | Refills: 0 | Status: SHIPPED | OUTPATIENT
Start: 2020-07-08 | End: 2020-08-07

## 2020-07-01 RX ORDER — DEXTROAMPHETAMINE SACCHARATE, AMPHETAMINE ASPARTATE, DEXTROAMPHETAMINE SULFATE AND AMPHETAMINE SULFATE 2.5; 2.5; 2.5; 2.5 MG/1; MG/1; MG/1; MG/1
10 TABLET ORAL DAILY
Qty: 30 TABLET | Refills: 0 | Status: SHIPPED | OUTPATIENT
Start: 2020-09-08 | End: 2020-10-12

## 2020-07-01 ASSESSMENT — MIFFLIN-ST. JEOR: SCORE: 1285.55

## 2020-09-25 ENCOUNTER — NURSE TRIAGE (OUTPATIENT)
Dept: NURSING | Facility: CLINIC | Age: 25
End: 2020-09-25

## 2020-09-25 DIAGNOSIS — R05.9 COUGH: Primary | ICD-10-CM

## 2020-09-25 RX ORDER — LORATADINE 10 MG/1
10 TABLET ORAL DAILY
Qty: 30 TABLET | Refills: 1 | Status: SHIPPED | OUTPATIENT
Start: 2020-09-25 | End: 2021-08-27

## 2020-09-25 NOTE — TELEPHONE ENCOUNTER
Patient requesting refill of loratadine be sent to [facility] in [place].    Fadia García RN  [facility] Triage Nurse Advisor    Please close encounter when completed.

## 2020-09-25 NOTE — TELEPHONE ENCOUNTER
Pending Prescriptions:                       Disp   Refills    loratadine (CLARITIN) 10 MG tablet        30 tab*1            Sig: Take 1 tablet (10 mg) by mouth daily    Routing refill request to covering provider for review/approval because:  Rx from 2017      Last Written Prescription Date:  10/17/2017  Last Fill Quantity: 30,  # refills: 1   Last office visit: Visit date not found with prescribing provider:     Future Office Visit:        Grisel Noble RN BSN

## 2020-09-25 NOTE — TELEPHONE ENCOUNTER
Will send script to pharmacy as patient routinely follows with Nellie Wang. Please notify patient.    Courtney Saab PA-C

## 2020-10-10 DIAGNOSIS — F90.2 ATTENTION DEFICIT HYPERACTIVITY DISORDER (ADHD), COMBINED TYPE: ICD-10-CM

## 2020-10-10 NOTE — TELEPHONE ENCOUNTER
Pending Prescriptions:                       Disp   Refills    ADDERALL XR 20 MG 24 hr capsule [Pharmacy *30 cap*0        Sig: TAKE ONE CAPSULE BY MOUTH ONE TIME DAILY     amphetamine-dextroamphetamine (ADDERALL) 1*30 tab*0        Sig: Take 1 tablet (10 mg) by mouth daily    Routing refill request to provider for review/approval because:  Drug not on the [facility] refill protocol

## 2020-10-10 NOTE — LETTER
[facility]  [address]  [place] [address]  Phone: [phone]        October 13, 2020      Donald Duarte                                                                                                                                [address] [place] [address]            Dear Ms. Duarte,    We are concerned about your health care.  We recently provided you with a medication refill.  Many medications require routine follow-up with your Doctor.      At this time we ask that: You schedule an appointment for your annual physical.    Your prescription: Has been refilled for 1 month so you may have time for the above noted follow-up.      Thank you,      Your [facility]

## 2020-10-12 RX ORDER — DEXTROAMPHETAMINE SACCHARATE, AMPHETAMINE ASPARTATE, DEXTROAMPHETAMINE SULFATE AND AMPHETAMINE SULFATE 2.5; 2.5; 2.5; 2.5 MG/1; MG/1; MG/1; MG/1
10 TABLET ORAL DAILY
Qty: 30 TABLET | Refills: 0 | Status: SHIPPED | OUTPATIENT
Start: 2020-10-12 | End: 2020-11-03

## 2020-10-12 RX ORDER — DEXTROAMPHETAMINE SULFATE, DEXTROAMPHETAMINE SACCHARATE, AMPHETAMINE SULFATE AND AMPHETAMINE ASPARTATE 5; 5; 5; 5 MG/1; MG/1; MG/1; MG/1
CAPSULE, EXTENDED RELEASE ORAL
Qty: 30 CAPSULE | Refills: 0 | Status: SHIPPED | OUTPATIENT
Start: 2020-10-12 | End: 2020-11-03

## 2020-10-12 NOTE — TELEPHONE ENCOUNTER
LMTCB. Please relay message below and help schedule with PCP.     Sang Damian MA on 10/12/2020 at 2:47 PM

## 2020-10-12 NOTE — TELEPHONE ENCOUNTER
meds approved, will need recheck with PCP monica this month, last appt was in July  Nathalie Hernandez PA-C

## 2020-10-20 ENCOUNTER — OFFICE VISIT (OUTPATIENT)
Dept: OBGYN | Facility: CLINIC | Age: 25
End: 2020-10-20
Payer: COMMERCIAL

## 2020-10-20 VITALS
TEMPERATURE: 97.9 F | SYSTOLIC BLOOD PRESSURE: 113 MMHG | WEIGHT: 133.06 LBS | BODY MASS INDEX: 24.96 KG/M2 | DIASTOLIC BLOOD PRESSURE: 77 MMHG | HEART RATE: 91 BPM

## 2020-10-20 DIAGNOSIS — R30.0 DYSURIA: Primary | ICD-10-CM

## 2020-10-20 DIAGNOSIS — N89.8 VAGINAL DISCHARGE: ICD-10-CM

## 2020-10-20 LAB
ALBUMIN UR-MCNC: NEGATIVE MG/DL
APPEARANCE UR: CLEAR
BILIRUB UR QL STRIP: NEGATIVE
COLOR UR AUTO: ABNORMAL
GLUCOSE UR STRIP-MCNC: NEGATIVE MG/DL
HGB UR QL STRIP: NEGATIVE
KETONES UR STRIP-MCNC: NEGATIVE MG/DL
LEUKOCYTE ESTERASE UR QL STRIP: NEGATIVE
NITRATE UR QL: NEGATIVE
NON-SQ EPI CELLS #/AREA URNS LPF: ABNORMAL /LPF
PH UR STRIP: 6 PH (ref 5–7)
RBC #/AREA URNS AUTO: ABNORMAL /HPF
SOURCE: ABNORMAL
SP GR UR STRIP: 1.01 (ref 1–1.03)
SPECIMEN SOURCE: NORMAL
UROBILINOGEN UR STRIP-MCNC: NORMAL MG/DL (ref 0–2)
WBC #/AREA URNS AUTO: ABNORMAL /HPF
WET PREP SPEC: NORMAL

## 2020-10-20 PROCEDURE — 81001 URINALYSIS AUTO W/SCOPE: CPT | Performed by: ADVANCED PRACTICE MIDWIFE

## 2020-10-20 PROCEDURE — 99213 OFFICE O/P EST LOW 20 MIN: CPT | Performed by: ADVANCED PRACTICE MIDWIFE

## 2020-10-20 PROCEDURE — 87210 SMEAR WET MOUNT SALINE/INK: CPT | Performed by: ADVANCED PRACTICE MIDWIFE

## 2020-10-20 RX ORDER — FLUCONAZOLE 150 MG/1
150 TABLET ORAL DAILY
Qty: 3 TABLET | Refills: 0 | Status: SHIPPED | OUTPATIENT
Start: 2020-10-20 | End: 2020-10-23

## 2020-10-20 NOTE — PROGRESS NOTES
SUBJECTIVE:    Donald Duarte is a 25 year old female who presents today with 2 week(s) of dysuria.     UTI HX: rare.  ADDITIONAL SX: notes an odor that she thinks is from her urine but could be vaginal.  Smells like popcorn    Patient Active Problem List   Diagnosis     DENISE (generalized anxiety disorder)     Steppage gait     Pes valgus, unspecified laterality     Attention deficit hyperactivity disorder (ADHD), combined type     Presence of intrauterine contraceptive device     Tachycardia     Social anxiety disorder     Past Medical History:   Diagnosis Date     DENISE (generalized anxiety disorder) 11/30/2013     Past Surgical History:   Procedure Laterality Date     DENTAL SURGERY      wisdom teeth     Current Outpatient Medications   Medication Sig Dispense Refill     ADDERALL XR 20 MG 24 hr capsule TAKE ONE CAPSULE BY MOUTH ONE TIME DAILY  30 capsule 0     amphetamine-dextroamphetamine (ADDERALL) 10 MG tablet Take 1 tablet (10 mg) by mouth daily 30 tablet 0     levonorgestrel (MIRENA) 20 MCG/24HR IUD 1 each (20 mcg) by Intrauterine route continuous       loratadine (CLARITIN) 10 MG tablet Take 1 tablet (10 mg) by mouth daily 30 tablet 1     Phenylephrine-Polyvinyl Alc (REFRESH REDNESS RELIEF) 0.12-1.4 % SOLN Apply 1 drop to eye 2 times daily as needed 1 Bottle 0     Allergies   Allergen Reactions     No Known Allergies          ROS:   ROS: 10 point ROS neg other than the symptoms noted above in the HPI.    EXAM  /77 (BP Location: Right arm, Patient Position: Sitting, Cuff Size: Adult Regular)   Pulse 91   Temp 97.9  F (36.6  C) (Oral)   Wt 60.4 kg (133 lb 1 oz)   BMI 24.96 kg/m    Patient appears well, afebrile.   Eyes:  sclera clear  Lungs:  breathing unlabored.   CV:  Negative for edema  ABD: Soft without supra pubic pain or tenderness  BACK: Neg CVAT.   M/S:  no gross defects.   Neuro: normal strength and sensation.   Psych:  appropriate, speech normal  Urine dip shows   Color Urine (no units)    Date Value   10/20/2020 Light Yellow     Appearance Urine (no units)   Date Value   10/20/2020 Clear     Glucose Urine (mg/dL)   Date Value   10/20/2020 Negative     Bilirubin Urine (no units)   Date Value   10/20/2020 Negative     Ketones Urine (mg/dL)   Date Value   10/20/2020 Negative     Specific Gravity Urine (no units)   Date Value   10/20/2020 1.008     pH Urine (pH)   Date Value   10/20/2020 6.0     Protein Albumin Urine (mg/dL)   Date Value   10/20/2020 Negative     Urobilinogen Urine (EU/dL)   Date Value   02/12/2001 0.2     Nitrite Urine (no units)   Date Value   10/20/2020 Negative     Leukocyte Esterase Urine (no units)   Date Value   10/20/2020 Negative     microscopic exam: 0-2 WBC's per HPF, 0-2 RBC's per HPF and moderate Epi cells/HPF.  Wet prep:  Negative.     ASSESSMENT: no evidence of a UTI    PLAN:   (R30.0) Dysuria  (primary encounter diagnosis)  Comment:   Plan: UA with Microscopic reflex to Culture, UA with         Microscopic reflex to Culture, Wet prep            (N89.8) Vaginal discharge  Comment:   Plan:     Will treat for yeast based on symptoms and history    Urine was not sent for culture.   Medications per orders in [facility].

## 2020-11-03 ENCOUNTER — OFFICE VISIT (OUTPATIENT)
Dept: FAMILY MEDICINE | Facility: CLINIC | Age: 25
End: 2020-11-03
Payer: COMMERCIAL

## 2020-11-03 VITALS
RESPIRATION RATE: 16 BRPM | HEIGHT: 62 IN | WEIGHT: 134 LBS | BODY MASS INDEX: 24.66 KG/M2 | HEART RATE: 80 BPM | SYSTOLIC BLOOD PRESSURE: 104 MMHG | DIASTOLIC BLOOD PRESSURE: 70 MMHG | TEMPERATURE: 98.5 F

## 2020-11-03 DIAGNOSIS — Z23 NEED FOR PROPHYLACTIC VACCINATION AND INOCULATION AGAINST INFLUENZA: ICD-10-CM

## 2020-11-03 DIAGNOSIS — F41.9 ANXIETY: Primary | ICD-10-CM

## 2020-11-03 PROCEDURE — 99213 OFFICE O/P EST LOW 20 MIN: CPT | Mod: 25 | Performed by: FAMILY MEDICINE

## 2020-11-03 PROCEDURE — 90472 IMMUNIZATION ADMIN EACH ADD: CPT | Performed by: FAMILY MEDICINE

## 2020-11-03 PROCEDURE — 90686 IIV4 VACC NO PRSV 0.5 ML IM: CPT | Performed by: FAMILY MEDICINE

## 2020-11-03 PROCEDURE — 90715 TDAP VACCINE 7 YRS/> IM: CPT | Performed by: FAMILY MEDICINE

## 2020-11-03 PROCEDURE — 90471 IMMUNIZATION ADMIN: CPT | Performed by: FAMILY MEDICINE

## 2020-11-03 ASSESSMENT — ANXIETY QUESTIONNAIRES
GAD7 TOTAL SCORE: 15
6. BECOMING EASILY ANNOYED OR IRRITABLE: NOT AT ALL
1. FEELING NERVOUS, ANXIOUS, OR ON EDGE: NEARLY EVERY DAY
IF YOU CHECKED OFF ANY PROBLEMS ON THIS QUESTIONNAIRE, HOW DIFFICULT HAVE THESE PROBLEMS MADE IT FOR YOU TO DO YOUR WORK, TAKE CARE OF THINGS AT HOME, OR GET ALONG WITH OTHER PEOPLE: SOMEWHAT DIFFICULT
5. BEING SO RESTLESS THAT IT IS HARD TO SIT STILL: SEVERAL DAYS
7. FEELING AFRAID AS IF SOMETHING AWFUL MIGHT HAPPEN: NEARLY EVERY DAY
2. NOT BEING ABLE TO STOP OR CONTROL WORRYING: NEARLY EVERY DAY
3. WORRYING TOO MUCH ABOUT DIFFERENT THINGS: NEARLY EVERY DAY

## 2020-11-03 ASSESSMENT — PATIENT HEALTH QUESTIONNAIRE - PHQ9
SUM OF ALL RESPONSES TO PHQ QUESTIONS 1-9: 11
5. POOR APPETITE OR OVEREATING: MORE THAN HALF THE DAYS

## 2020-11-03 ASSESSMENT — MIFFLIN-ST. JEOR: SCORE: 1303.13

## 2020-11-03 NOTE — PATIENT INSTRUCTIONS
Patient Education     Anxiety Reaction  Anxiety is the feeling we all get when we think something bad might happen. It is a normal response to stress and usually causes only a mild reaction. When anxiety becomes more severe, it can interfere with daily life. In some cases, you may not even be aware of what it is you re anxious about. There may also be a genetic link or it may be a learned behavior in the home.  Both psychological and physical triggers cause stress reaction. It's often a response to fear or emotional stress, real or imagined. This stress may come from home, family, work, or social relationships.  During an anxiety reaction, you may feel:    Helpless    Nervous    Depressed    Irritable  Your body may show signs of anxiety in many ways. You may experience:    Dry mouth    Shakiness    Dizziness    Weakness    Trouble breathing    Breathing fast (hyperventilating)    Chest pressure    Sweating    Headache    Nausea    Diarrhea    Tiredness    Inability to sleep    Sexual problems  Home care    Try to locate the sources of stress in your life. They may not be obvious. These may include:  ? Daily hassles of life (such as traffic jams, missed appointments, or car troubles)  ? Major life changes, both good (new baby or job promotion) and bad (loss of job or loss of loved one)  ? Overload: feeling that you have too many responsibilities and can't take care of all of them at once  ? Feeling helpless or feeling that your problems are beyond what you re able to solve    Notice how your body reacts to stress. Learn to listen to your body signals. This will help you take action before the stress becomes severe.    When you can, do something about the source of your stress. (Avoid hassles, limit the amount of change that happens in your life at one time and take a break when you feel overloaded).    Unfortunately, many stressful situations can't be avoided. It is necessary to learn how to better manage stress.  There are many proven methods that will reduce your anxiety. These include simple things like exercise, good nutrition, and adequate rest. Also, there are certain techniques that are helpful:  ? Relaxation  ? Breathing exercises  ? Visualization  ? Biofeedback  ? Meditation  For more information about this, consult your healthcare provider or go to a local bookstore and review the many books and tapes available on this subject.  Follow-up care  If you feel that your anxiety is not responding to self-help measures, contact your healthcare provider or make an appointment with a counselor. You may need short-term psychological counseling and temporary medicine to help you manage stress.  Call 911  Call 911 if any of these happen:    Trouble breathing    Confusion    Drowsiness or trouble wakening    Fainting or loss of consciousness    Rapid heart rate    Seizure    New chest pain that becomes more severe, lasts longer, or spreads into your shoulder, arm, neck, jaw, or back  When to seek medical advice  Call your healthcare provider right away if any of these happen:    Your symptoms get worse    Severe headache not relieved by rest and mild pain reliever  gdgt last reviewed this educational content on 10/1/2017    [phone] The [employer]. [address] [place] [address]. All rights reserved. This information is not intended as a substitute for professional medical care. Always follow your healthcare professional's instructions.           Patient Education     Treating Anxiety Disorders with Medicine  An anxiety disorder can make you feel nervous or apprehensive, even without a clear reason. In people age 65 and older, generalized anxiety disorder is one of the most commonly diagnosed anxiety disorders. Many times it occurs with depression. Certain anxiety disorders can cause intense feelings of fear or panic. You may even have physical symptoms such as a racing heartbeat, sweating, or  dizziness. If you have these feelings, you don t have to suffer anymore. Treatment to help you overcome your fears will likely include therapy (also called counseling). Medicine may also be prescribed to help control your symptoms.     Medicines  Certain medicines may be prescribed to help control your symptoms. So you may feel less anxious. You may also feel able to move forward with therapy. At first, medicines and dosages may need to be adjusted to find what works best for you. Try to be patient. Tell your healthcare provider how a medicine makes you feel. This way, you can work together to find the treatment that s best for you. Keep in mind that medicines can have side effects. Talk with your provider about any side effects that are bothering you. Changing the dose or type of medicine may help. Don t stop taking medicine on your own. That can cause symptoms to come back or cause dangerous withdrawal symptoms.     Anti-anxiety medicine. This medicine eases symptoms and helps you relax. Your healthcare provider will explain when and how to use it. It may be prescribed for use before situations that make you anxious. You may also be told to take medicine on a regular schedule. Anti-anxiety medicine may make you feel a little sleepy or  out of it.  Don t drive a car or operate machinery while on this medicine, until you know how it affects you.  Never use alcohol or other drugs with anti-anxiety medicines. This could result in loss of muscular control, sedation, coma, or death. Also, use only the amount of medicine prescribed for you. If you think you may have taken too much, get emergency care right away. Never share your medicines with others. Store these medicines in a safe place that can't be reached by children or visitors.   Keep taking medicines as prescribed  Never change your dosage, share or use another person's medicine, or stop taking your medicines without talking to your healthcare provider first.  Keep the following in mind:     Some medicines must be taken on a schedule. Make this part of your daily routine. For instance, always take your pill before brushing your teeth. A pillbox can help you remember if you ve taken your medicine each day.    Medicines are often taken for 6 to 12 months. Your healthcare provider will then evaluate whether you need to stay on them. Many people who have also had therapy may no longer need medicine to manage anxiety.    You may need to stop taking medicine slowly to give your body time to adjust. When it s time to stop, your healthcare provider will tell you more. Remember: Never stop taking your medicine without talking to your provider first.    If symptoms return, you may need to start taking medicines again.  This isn t your fault. It s just the nature of your anxiety disorder.  What to think about    Side effects. Medicines may cause side effects. Ask your healthcare provider or pharmacist what you can expect. They may have ideas for avoiding some side effects.    Sexual problems. Some antidepressants can affect your desire for sex or your ability to have an orgasm. A change in dosage or medicine often solves the problem. If you have a sexual side effect that concerns you, tell your healthcare provider.    Addiction. If you ve never had a problem with drugs or alcohol, you may not have a problem with medicines used to treat anxiety disorders. But always discuss the medicines with your healthcare provider before taking them. If you have a history of addiction, you may not be able to use certain medicines used to treat anxiety disorders.    Medicine interactions. Always check with your pharmacist before using any over-the-counter medicines (OTCs), including herbal supplements. Some OTCs may interact with your anti-anxiety medicines and increase or decrease their effectiveness.    Alma Rosa last reviewed this educational content on 12/1/2019 2000-2020 The [employer]  [employer]. [address] [place] [address], [place] [address]. All rights reserved. This information is not intended as a substitute for professional medical care. Always follow your healthcare professional's instructions.           Patient Education     Treating Anxiety Disorders with Therapy    If you have an anxiety disorder, you don t have to suffer anymore. Treatment is available. Therapy (also called counseling) is often a helpful treatment for anxiety disorders. With therapy, a specially trained professional (therapist) helps you face and learn to manage your anxiety. Therapy can be short-term or long-term depending on your needs. In some cases, medicine may also be prescribed with therapy. It may take time before you notice how much therapy is helping, but stick with it. With therapy, you can feel better.  Cognitive behavioral therapy (CBT)  Cognitive behavioral therapy (CBT) teaches you to manage anxiety. It does this by helping you understand how you think and act when you re anxious. Research has shown CBT to be a very effective treatment for anxiety disorders. How CBT is run is almost like a class. It involves homework and activities to build skills that teach you to cope with anxiety step by step. It can be done in a group or one-on-one, and often takes place for a set number of sessions. CBT has two main parts:    Cognitive therapy helps you identify the negative, irrational thoughts that occur with your anxiety. You ll learn to replace these with more positive, realistic thoughts.    Behavioral therapy helps you change how you react to anxiety. You ll learn coping skills and methods for relaxing to help you better deal with anxiety.  Other forms of therapy  Other therapy methods may work better for you than CBT. Or, you may move from CBT to another form of therapy as your treatment needs change. This may mean meeting with a therapist by yourself or in a group. Therapy can also help you work through problems  in your life, such as drug or alcohol dependence, that may be making your anxiety worse.  Getting better takes time  Therapy will help you feel better and teach you skills to help manage anxiety long term. But change doesn t happen right away. It takes a commitment from you. And treatment only works if you learn to face the causes of your anxiety. So, you might feel worse before you feel better. This can sometimes make it hard to stick with it. But remember: Therapy is a very effective treatment. The results will be well worth it.  Helping yourself  If anxiety is wearing you down, here are some things you can do to cope:    Check with your doctor and rule out any physical problems that may be causing the anxiety symptoms.    If an anxiety disorder is diagnosed, seek mental healthcare. This is an illness and it can respond to treatment. Most types of anxiety disorders will respond to talk therapy and medicine.    Educate yourself about anxiety disorders. Keep track of helpful online resources and books you can use during stressful periods.    Try stress management techniques such as meditation.    Consider online or in-person support groups.    Don t fight your feelings. Anxiety feeds itself. The more you worry about it, the worse it gets. Instead, try to identify what might have triggered your anxiety. Then try to put this threat in perspective.    Keep in mind that you can t control everything about a situation. Change what you can and let the rest take its course.    Exercise -- it s a great way to relieve tension and help your body feel relaxed.    Examine your life for stress, and try to find ways to reduce it.    Avoid caffeine and nicotine, which can make anxiety symptoms worse.    Fight the temptation to turn to alcohol or unprescribed drugs for relief. They only make things worse in the long run.   kingsky last reviewed this educational content on 1/1/2017 2000-2020 [employer] [address]  [place] [address]. All rights reserved. This information is not intended as a substitute for professional medical care. Always follow your healthcare professional's instructions.

## 2020-11-03 NOTE — PROGRESS NOTES
"Subjective     Donald Duarte is a 25 year old female who presents to clinic today for the following health issues:    HPI         Medication Followup of adderall    Taking Medication as prescribed: yes    Side Effects:  anxiety    Medication Helping Symptoms:  yes   SUBJECTIVE:  Patient is here today to recheck ADHD/ADD.    Updates since last visit: lack of motivation  Routine for taking medicine, including time: 6-7 am  Time medicine wears off: 6pm  Issues at school/Work: none  Issues at home: none  Control of symptoms: no    Side effects:  Headaches: No  Stomach aches: No  Irritability/mood swings: No  Difficulties with sleep: Yes   Social withdrawal: No  Unusual movements/tics: No  Decreased appetite: No    Other concerns: would like to change medication. Lack of motivation- more anxiety          Review of Systems   Constitutional, HEENT, cardiovascular, pulmonary, gi and gu systems are negative, except as otherwise noted.      Objective    /70   Pulse 80   Temp 98.5  F (36.9  C) (Temporal)   Resp 16   Ht 1.562 m (5' 1.5\")   Wt 60.8 kg (134 lb)   BMI 24.91 kg/m    Body mass index is 24.91 kg/m .     Physical Exam   GENERAL: healthy, alert and no distress  NECK: no adenopathy, no asymmetry, masses, or scars and thyroid normal to palpation  RESP: lungs clear to auscultation - no rales, rhonchi or wheezes  CV: regular rate and rhythm, normal S1 S2, no S3 or S4, no murmur, click or rub, no peripheral edema and peripheral pulses strong  MS: no gross musculoskeletal defects noted, no edema  NEURO: Normal strength and tone, mentation intact and speech normal  PSYCH: mentation appears normal, affect normal/bright            Assessment & Plan     Anxiety  25-year-old female with history of anxiety.  She was tested in 2016, test showed possible ADHD and anxiety.  She has noticed a worsening of her anxiety since being on Adderall.  She would like to stop that, states she is able to focus.  We discussed " treating her anxiety and the fact that that might improve concentration as well.  We reviewed the different SSRIs and SNRIs in her side effects.  Will start sertraline and she will follow-up with me in 2 weeks.  - sertraline (ZOLOFT) 50 MG tablet; Take 1 tablet (50 mg) by mouth daily    Need for prophylactic vaccination and inoculation against influenza  Flu vaccine conducted today along with Tdap.  -      ADMIN VACCINE, FIRST  - INFLUENZA VACCINE IM > 6 MONTHS VALENT IIV4 [73542]            Return in about 2 weeks (around 11/17/2020) for Follow Up from Today's Encounter.    Timothy Luna MD  [facility]

## 2020-11-04 ASSESSMENT — ANXIETY QUESTIONNAIRES: GAD7 TOTAL SCORE: 15

## 2020-12-01 DIAGNOSIS — F41.9 ANXIETY: ICD-10-CM

## 2020-12-01 NOTE — TELEPHONE ENCOUNTER
Reason for Call:  Medication or medication refill:    Do you use a [facility]?  Name of the pharmacy and phone number for the current request:  [facility] [address] [place] [address], [place] - [address] [facility]    Name of the medication requested: sertraline (ZOLOFT) 50 MG tablet    Other request: Patient called and we made an apt for Tuesday the 8th but she will be out by the time her apt is and is requesting a refill to get her to her apt. Please advise thank you.    Can we leave a detailed message on this number? YES    Phone number patient can be reached at: Home number on file [phone] (home)    Best Time: anyitme    Call taken on 12/1/2020 at 8:06 AM by Flakita Soto

## 2020-12-02 NOTE — TELEPHONE ENCOUNTER
Prescription approved per [facility] Refill Protocol.  Next 5 appointments (look out 90 days)    Dec 08, 2020  8:20 AM  Office Visit with Timothy Luna MD  [facility] ([facility]) [address], [address]  [facility] [address]  [phone]        Linda Ray RN

## 2020-12-09 NOTE — PROGRESS NOTES
Subjective     Donald Duarte is a 25 year old female who presents to clinic today for the following health issues:    History of Present Illness       Mental Health Follow-up:  Patient presents to follow-up on Anxiety.    Patient's anxiety since last visit has been:  Better  The patient is not having other symptoms associated with anxiety.  Any significant life events: No  Patient is not feeling anxious or having panic attacks.  Patient has no concerns about alcohol or drug use.     Social History  Tobacco Use    Smoking status: Never Smoker    Smokeless tobacco: Never Used  Alcohol use: No  Drug use: No      Today's PHQ-9         PHQ-9 Total Score:     (P) 2   PHQ-9 Q9 Thoughts of better off dead/self-harm past 2 weeks :   (P) Not at all   Thoughts of suicide or self harm:      Self-harm Plan:        Self-harm Action:          Safety concerns for self or others:           She eats 4 or more servings of fruits and vegetables daily.She consumes 1 sweetened beverage(s) daily.She exercises with enough effort to increase her heart rate 9 or less minutes per day.  She exercises with enough effort to increase her heart rate 3 or less days per week.   She is taking medications regularly.             Review of Systems   Constitutional, HEENT, cardiovascular, pulmonary, gi and gu systems are negative, except as otherwise noted.      Objective    /68   Pulse 83   Resp 16   Wt 62.4 kg (137 lb 8 oz)   SpO2 98%   BMI 25.56 kg/m    Body mass index is 25.56 kg/m .  Physical Exam   GENERAL: healthy, alert and no distress  NECK: no adenopathy, no asymmetry, masses, or scars and thyroid normal to palpation  RESP: lungs clear to auscultation - no rales, rhonchi or wheezes  CV: regular rate and rhythm, normal S1 S2, no S3 or S4, no murmur, click or rub, no peripheral edema and peripheral pulses strong  MS: no gross musculoskeletal defects noted, no edema  PSYCH: mentation appears normal, affect normal/bright    Office  Visit on 10/20/2020   Component Date Value Ref Range Status     Color Urine 10/20/2020 Light Yellow   Final     Appearance Urine 10/20/2020 Clear   Final     Glucose Urine 10/20/2020 Negative  NEG^Negative mg/dL Final     Bilirubin Urine 10/20/2020 Negative  NEG^Negative Final     Ketones Urine 10/20/2020 Negative  NEG^Negative mg/dL Final     Specific Gravity Urine 10/20/2020 1.008  1.003 - 1.035 Final     Blood Urine 10/20/2020 Negative  NEG^Negative Final     pH Urine 10/20/2020 6.0  5.0 - 7.0 pH Final     Protein Albumin Urine 10/20/2020 Negative  NEG^Negative mg/dL Final     Urobilinogen mg/dL 10/20/2020 Normal  0.0 - 2.0 mg/dL Final     Nitrite Urine 10/20/2020 Negative  NEG^Negative Final     Leukocyte Esterase Urine 10/20/2020 Negative  NEG^Negative Final     Source 10/20/2020 Midstream Urine   Final     WBC Urine 10/20/2020 0 - 5  OTO5^0 - 5 /HPF Final     RBC Urine 10/20/2020 O - 2  OTO2^O - 2 /HPF Final     Squamous Epithelial /LPF Urine 10/20/2020 Moderate* FEW^Few /LPF Final     Specimen Description 10/20/2020 Vagina   Final     Wet Prep 10/20/2020    Final                    Value:Moderate  WBC'S seen       Wet Prep 10/20/2020 No Trichomonas seen   Final     Wet Prep 10/20/2020 No clue cells seen   Final     Wet Prep 10/20/2020 No yeast seen   Final           Assessment & Plan     DENISE (generalized anxiety disorder)  25-year-old female with history of generalized anxiety disorder, last visit was started on sertraline 50 mg.  She is responded very well to that medication, no significant side effects.  Currently feels safe without suicidal ideation.  Will continue at 50 mg dose and she will follow-up with me in 3 months, sooner if any worsening of mood.  - sertraline (ZOLOFT) 50 MG tablet; Take 1 tablet (50 mg) by mouth daily          Return in about 4 weeks (around 1/7/2021) for Annual Well Check.    Timothy Luna MD  [facility]    Answers for HPI/ROS submitted by the patient on  12/10/2020   Chronic problems general questions HPI Form  If you checked off any problems, how difficult have these problems made it for you to do your work, take care of things at home, or get along with other people?: Not difficult at all  PHQ9 TOTAL SCORE: 2  DENISE 7 TOTAL SCORE: 1

## 2020-12-10 ENCOUNTER — OFFICE VISIT (OUTPATIENT)
Dept: FAMILY MEDICINE | Facility: CLINIC | Age: 25
End: 2020-12-10
Payer: COMMERCIAL

## 2020-12-10 VITALS
WEIGHT: 137.5 LBS | SYSTOLIC BLOOD PRESSURE: 130 MMHG | OXYGEN SATURATION: 98 % | DIASTOLIC BLOOD PRESSURE: 68 MMHG | HEART RATE: 83 BPM | BODY MASS INDEX: 25.56 KG/M2 | RESPIRATION RATE: 16 BRPM

## 2020-12-10 DIAGNOSIS — F41.1 GAD (GENERALIZED ANXIETY DISORDER): Primary | ICD-10-CM

## 2020-12-10 PROCEDURE — 99213 OFFICE O/P EST LOW 20 MIN: CPT | Performed by: FAMILY MEDICINE

## 2020-12-10 ASSESSMENT — ANXIETY QUESTIONNAIRES
GAD7 TOTAL SCORE: 1
GAD7 TOTAL SCORE: 1
3. WORRYING TOO MUCH ABOUT DIFFERENT THINGS: NOT AT ALL
5. BEING SO RESTLESS THAT IT IS HARD TO SIT STILL: NOT AT ALL
GAD7 TOTAL SCORE: 1
4. TROUBLE RELAXING: NOT AT ALL
1. FEELING NERVOUS, ANXIOUS, OR ON EDGE: SEVERAL DAYS
7. FEELING AFRAID AS IF SOMETHING AWFUL MIGHT HAPPEN: NOT AT ALL
2. NOT BEING ABLE TO STOP OR CONTROL WORRYING: NOT AT ALL
7. FEELING AFRAID AS IF SOMETHING AWFUL MIGHT HAPPEN: NOT AT ALL
6. BECOMING EASILY ANNOYED OR IRRITABLE: NOT AT ALL

## 2020-12-10 ASSESSMENT — PATIENT HEALTH QUESTIONNAIRE - PHQ9
SUM OF ALL RESPONSES TO PHQ QUESTIONS 1-9: 2
10. IF YOU CHECKED OFF ANY PROBLEMS, HOW DIFFICULT HAVE THESE PROBLEMS MADE IT FOR YOU TO DO YOUR WORK, TAKE CARE OF THINGS AT HOME, OR GET ALONG WITH OTHER PEOPLE: NOT DIFFICULT AT ALL
SUM OF ALL RESPONSES TO PHQ QUESTIONS 1-9: 2

## 2020-12-10 NOTE — PATIENT INSTRUCTIONS
Patient Education     Treating Anxiety Disorders with Therapy    If you have an anxiety disorder, you don t have to suffer anymore. Treatment is available. Therapy (also called counseling) is often a helpful treatment for anxiety disorders. With therapy, a specially trained professional (therapist) helps you face and learn to manage your anxiety. Therapy can be short-term or long-term depending on your needs. In some cases, medicine may also be prescribed with therapy. It may take time before you notice how much therapy is helping, but stick with it. With therapy, you can feel better.  Cognitive behavioral therapy (CBT)  Cognitive behavioral therapy (CBT) teaches you to manage anxiety. It does this by helping you understand how you think and act when you re anxious. Research has shown CBT to be a very effective treatment for anxiety disorders. How CBT is run is almost like a class. It involves homework and activities to build skills that teach you to cope with anxiety step by step. It can be done in a group or one-on-one, and often takes place for a set number of sessions. CBT has two main parts:    Cognitive therapy helps you identify the negative, irrational thoughts that occur with your anxiety. You ll learn to replace these with more positive, realistic thoughts.    Behavioral therapy helps you change how you react to anxiety. You ll learn coping skills and methods for relaxing to help you better deal with anxiety.  Other forms of therapy  Other therapy methods may work better for you than CBT. Or, you may move from CBT to another form of therapy as your treatment needs change. This may mean meeting with a therapist by yourself or in a group. Therapy can also help you work through problems in your life, such as drug or alcohol dependence, that may be making your anxiety worse.  Getting better takes time  Therapy will help you feel better and teach you skills to help manage anxiety long term. But change doesn t  happen right away. It takes a commitment from you. And treatment only works if you learn to face the causes of your anxiety. So, you might feel worse before you feel better. This can sometimes make it hard to stick with it. But remember: Therapy is a very effective treatment. The results will be well worth it.  Helping yourself  If anxiety is wearing you down, here are some things you can do to cope:    Check with your doctor and rule out any physical problems that may be causing the anxiety symptoms.    If an anxiety disorder is diagnosed, seek mental healthcare. This is an illness and it can respond to treatment. Most types of anxiety disorders will respond to talk therapy and medicine.    Educate yourself about anxiety disorders. Keep track of helpful online resources and books you can use during stressful periods.    Try stress management techniques such as meditation.    Consider online or in-person support groups.    Don t fight your feelings. Anxiety feeds itself. The more you worry about it, the worse it gets. Instead, try to identify what might have triggered your anxiety. Then try to put this threat in perspective.    Keep in mind that you can t control everything about a situation. Change what you can and let the rest take its course.    Exercise -- it s a great way to relieve tension and help your body feel relaxed.    Examine your life for stress, and try to find ways to reduce it.    Avoid caffeine and nicotine, which can make anxiety symptoms worse.    Fight the temptation to turn to alcohol or unprescribed drugs for relief. They only make things worse in the long run.   [employer] last reviewed this educational content on 1/1/2017 2000-2020 [employer]. [address] [facility], [place] [address]. All rights reserved. This information is not intended as a substitute for professional medical care. Always follow your healthcare professional's instructions.           Patient Education      Treating Anxiety Disorders with Medicine  An anxiety disorder can make you feel nervous or apprehensive, even without a clear reason. In people age 65 and older, generalized anxiety disorder is one of the most commonly diagnosed anxiety disorders. Many times it occurs with depression. Certain anxiety disorders can cause intense feelings of fear or panic. You may even have physical symptoms such as a racing heartbeat, sweating, or dizziness. If you have these feelings, you don t have to suffer anymore. Treatment to help you overcome your fears will likely include therapy (also called counseling). Medicine may also be prescribed to help control your symptoms.     Medicines  Certain medicines may be prescribed to help control your symptoms. So you may feel less anxious. You may also feel able to move forward with therapy. At first, medicines and dosages may need to be adjusted to find what works best for you. Try to be patient. Tell your healthcare provider how a medicine makes you feel. This way, you can work together to find the treatment that s best for you. Keep in mind that medicines can have side effects. Talk with your provider about any side effects that are bothering you. Changing the dose or type of medicine may help. Don t stop taking medicine on your own. That can cause symptoms to come back or cause dangerous withdrawal symptoms.     Anti-anxiety medicine. This medicine eases symptoms and helps you relax. Your healthcare provider will explain when and how to use it. It may be prescribed for use before situations that make you anxious. You may also be told to take medicine on a regular schedule. Anti-anxiety medicine may make you feel a little sleepy or  out of it.  Don t drive a car or operate machinery while on this medicine, until you know how it affects you.  Never use alcohol or other drugs with anti-anxiety medicines. This could result in loss of muscular control, sedation, coma, or death. Also,  use only the amount of medicine prescribed for you. If you think you may have taken too much, get emergency care right away. Never share your medicines with others. Store these medicines in a safe place that can't be reached by children or visitors.   Keep taking medicines as prescribed  Never change your dosage, share or use another person's medicine, or stop taking your medicines without talking to your healthcare provider first. Keep the following in mind:     Some medicines must be taken on a schedule. Make this part of your daily routine. For instance, always take your pill before brushing your teeth. A pillbox can help you remember if you ve taken your medicine each day.    Medicines are often taken for 6 to 12 months. Your healthcare provider will then evaluate whether you need to stay on them. Many people who have also had therapy may no longer need medicine to manage anxiety.    You may need to stop taking medicine slowly to give your body time to adjust. When it s time to stop, your healthcare provider will tell you more. Remember: Never stop taking your medicine without talking to your provider first.    If symptoms return, you may need to start taking medicines again.  This isn t your fault. It s just the nature of your anxiety disorder.  What to think about    Side effects. Medicines may cause side effects. Ask your healthcare provider or pharmacist what you can expect. They may have ideas for avoiding some side effects.    Sexual problems. Some antidepressants can affect your desire for sex or your ability to have an orgasm. A change in dosage or medicine often solves the problem. If you have a sexual side effect that concerns you, tell your healthcare provider.    Addiction. If you ve never had a problem with drugs or alcohol, you may not have a problem with medicines used to treat anxiety disorders. But always discuss the medicines with your healthcare provider before taking them. If you have a  history of addiction, you may not be able to use certain medicines used to treat anxiety disorders.    Medicine interactions. Always check with your pharmacist before using any over-the-counter medicines (OTCs), including herbal supplements. Some OTCs may interact with your anti-anxiety medicines and increase or decrease their effectiveness.    [employer] last reviewed this educational content on 12/1/2019 2000-2020 [employer]. [address] [place] [address], [place] [address]. All rights reserved. This information is not intended as a substitute for professional medical care. Always follow your healthcare professional's instructions.

## 2020-12-11 ASSESSMENT — PATIENT HEALTH QUESTIONNAIRE - PHQ9: SUM OF ALL RESPONSES TO PHQ QUESTIONS 1-9: 2

## 2020-12-11 ASSESSMENT — ANXIETY QUESTIONNAIRES: GAD7 TOTAL SCORE: 1

## 2021-01-07 NOTE — PROGRESS NOTES
{PROVIDER CHARTING PREFERENCE:739859}    Flynn Bennett is a 25 year old who presents to clinic today for the following health issues {ACCOMPANIED BY STATEMENT (Optional):418881}    HPI       Patient here to have mole under her right eye looked at. Patient stated that its been there for as long as she can remember.       {additonal problems for provider to add (Optional):689369}    Review of Systems   {ROS COMP (Optional):897443}      Objective    There were no vitals taken for this visit.  There is no height or weight on file to calculate BMI.  Physical Exam   {Exam List (Optional):039807}    {Diagnostic Test Results (Optional):966118}    {AMBULATORY ATTESTATION (Optional):438852}

## 2021-01-11 ENCOUNTER — OFFICE VISIT (OUTPATIENT)
Dept: FAMILY MEDICINE | Facility: CLINIC | Age: 26
End: 2021-01-11
Payer: COMMERCIAL

## 2021-01-11 VITALS
OXYGEN SATURATION: 100 % | WEIGHT: 136 LBS | RESPIRATION RATE: 16 BRPM | DIASTOLIC BLOOD PRESSURE: 80 MMHG | HEART RATE: 74 BPM | BODY MASS INDEX: 25.68 KG/M2 | TEMPERATURE: 99.3 F | HEIGHT: 61 IN | SYSTOLIC BLOOD PRESSURE: 100 MMHG

## 2021-01-11 DIAGNOSIS — Z12.4 SCREENING FOR MALIGNANT NEOPLASM OF CERVIX: ICD-10-CM

## 2021-01-11 DIAGNOSIS — I78.1 NEVUS, NON-NEOPLASTIC: ICD-10-CM

## 2021-01-11 DIAGNOSIS — Z00.00 ROUTINE GENERAL MEDICAL EXAMINATION AT A HEALTH CARE FACILITY: Primary | ICD-10-CM

## 2021-01-11 DIAGNOSIS — Z11.3 SCREENING FOR STDS (SEXUALLY TRANSMITTED DISEASES): ICD-10-CM

## 2021-01-11 DIAGNOSIS — Z11.59 NEED FOR HEPATITIS C SCREENING TEST: ICD-10-CM

## 2021-01-11 PROBLEM — R00.0 TACHYCARDIA: Status: RESOLVED | Noted: 2018-01-31 | Resolved: 2021-01-11

## 2021-01-11 PROBLEM — G60.0 CHARCOT-MARIE-TOOTH DISEASE: Status: ACTIVE | Noted: 2018-04-29

## 2021-01-11 PROCEDURE — 99213 OFFICE O/P EST LOW 20 MIN: CPT | Mod: 25 | Performed by: NURSE PRACTITIONER

## 2021-01-11 PROCEDURE — 99395 PREV VISIT EST AGE 18-39: CPT | Performed by: NURSE PRACTITIONER

## 2021-01-11 PROCEDURE — G0145 SCR C/V CYTO,THINLAYER,RESCR: HCPCS | Performed by: NURSE PRACTITIONER

## 2021-01-11 ASSESSMENT — MIFFLIN-ST. JEOR: SCORE: 1310.88

## 2021-01-11 NOTE — PROGRESS NOTES
SUBJECTIVE:   CC: Donald Duarte is an 25 year old woman who presents for preventive health visit.       Patient has been advised of split billing requirements and indicates understanding: Yes  HPI    Patient has mole above right eye lid that she states has been there for years no recent changes.  Denies any bleeding or scabbing.          Today's PHQ-2 Score:   PHQ-2 ( 1999 [employer]) 12/10/2020   Q1: Little interest or pleasure in doing things 0   Q2: Feeling down, depressed or hopeless 0   PHQ-2 Score 0   Q1: Little interest or pleasure in doing things Not at all   Q2: Feeling down, depressed or hopeless Not at all   PHQ-2 Score 0       Abuse: Current or Past (Physical, Sexual or Emotional) - No  Do you feel safe in your environment? Yes        Social History     Tobacco Use     Smoking status: Never Smoker     Smokeless tobacco: Never Used   Substance Use Topics     Alcohol use: No     If you drink alcohol do you typically have >3 drinks per day or >7 drinks per week? No    Alcohol Use 11/21/2017   Prescreen: >3 drinks/day or >7 drinks/week? The patient does not drink >3 drinks per day nor >7 drinks per week.   No flowsheet data found.    Reviewed orders with patient.  Reviewed health maintenance and updated orders accordingly - Yes  Labs reviewed in EPIC  BP Readings from Last 3 Encounters:   01/11/21 100/80   12/10/20 130/68   11/03/20 104/70    Wt Readings from Last 3 Encounters:   01/11/21 61.7 kg (136 lb)   12/10/20 62.4 kg (137 lb 8 oz)   11/03/20 60.8 kg (134 lb)                  Patient Active Problem List   Diagnosis     DENISE (generalized anxiety disorder)     Steppage gait     Pes valgus, unspecified laterality     Attention deficit hyperactivity disorder (ADHD), combined type     Presence of intrauterine contraceptive device     Tachycardia     Social anxiety disorder     Past Surgical History:   Procedure Laterality Date     DENTAL SURGERY      wisdom teeth       Social History     Tobacco Use      Smoking status: Never Smoker     Smokeless tobacco: Never Used   Substance Use Topics     Alcohol use: No     Family History   Problem Relation Age of Onset     Hypertension Mother      Arthritis Paternal Grandmother         lupus     Breast Cancer Paternal Aunt          Current Outpatient Medications   Medication Sig Dispense Refill     levonorgestrel (MIRENA) 20 MCG/24HR IUD 1 each (20 mcg) by Intrauterine route continuous       loratadine (CLARITIN) 10 MG tablet Take 1 tablet (10 mg) by mouth daily 30 tablet 1     Phenylephrine-Polyvinyl Alc (REFRESH REDNESS RELIEF) 0.12-1.4 % SOLN Apply 1 drop to eye 2 times daily as needed 1 Bottle 0     sertraline (ZOLOFT) 50 MG tablet Take 1 tablet (50 mg) by mouth daily 90 tablet 3     Allergies   Allergen Reactions     No Known Allergies      Recent Labs   Lab Test 01/31/18  1532   CR 0.66   GFRESTIMATED >90   GFRESTBLACK >90   POTASSIUM 3.9   TSH 0.90        Mammogram not appropriate for this patient based on age.    Pertinent mammograms are reviewed under the imaging tab.  History of abnormal Pap smear:   NO - age 21-29 PAP every 3 years recommended  Last 3 Pap Results:   PAP (no units)   Date Value   11/21/2017 NIL     PAP / HPV 11/21/2017   PAP NIL     Reviewed and updated as needed this visit by clinical staff  Tobacco  Allergies  Meds   Med Hx  Surg Hx  Fam Hx  Soc Hx        Reviewed and updated as needed this visit by Provider                    Review of Systems  CONSTITUTIONAL: NEGATIVE for fever, chills, change in weight  INTEGUMENTARU/SKIN: NEGATIVE for worrisome rashes, moles or lesions  EYES: NEGATIVE for vision changes or irritation  ENT: NEGATIVE for ear, mouth and throat problems  RESP: NEGATIVE for significant cough or SOB  BREAST: NEGATIVE for masses, tenderness or discharge  CV: NEGATIVE for chest pain, palpitations or peripheral edema  GI: NEGATIVE for nausea, abdominal pain, heartburn, or change in bowel habits  : NEGATIVE for unusual urinary or  "vaginal symptoms. Periods are regular.  MUSCULOSKELETAL: NEGATIVE for significant arthralgias or myalgia  NEURO: NEGATIVE for weakness, dizziness or paresthesias  ENDOCRINE: NEGATIVE for temperature intolerance, skin/hair changes  HEME/ALLERGY/IMMUNE: NEGATIVE for bleeding problems  PSYCHIATRIC: NEGATIVE for changes in mood or affect     OBJECTIVE:   /80   Pulse 74   Temp 99.3  F (37.4  C) (Temporal)   Resp 16   Ht 1.56 m (5' 1.42\")   Wt 61.7 kg (136 lb)   SpO2 100%   BMI 25.35 kg/m    Physical Exam  GENERAL: healthy, alert and no distress  EYES: Eyes grossly normal to inspection, PERRL and conjunctivae and sclerae normal  HENT: ear canals and TM's normal, nose and mouth without ulcers or lesions  NECK: no adenopathy, no asymmetry, masses, or scars and thyroid normal to palpation  RESP: lungs clear to auscultation - no rales, rhonchi or wheezes  BREAST: normal without masses, tenderness or nipple discharge and no palpable axillary masses or adenopathy  CV: regular rate and rhythm, normal S1 S2, no S3 or S4, no murmur, click or rub, no peripheral edema and peripheral pulses strong  ABDOMEN: soft, nontender, no hepatosplenomegaly, no masses and bowel sounds normal   (female): normal female external genitalia, normal urethral meatus, vaginal mucosa pink, moist, well rugated, and normal cervix/adnexa/uterus without masses or discharge  MS: no gross musculoskeletal defects noted, no edema  SKIN: Small approximately 4 mm nevi under lower right eyebrow.  NEURO: Normal strength and tone, mentation intact and speech normal  PSYCH: mentation appears normal, affect normal/bright  LYMPH: no cervical, supraclavicular, axillary, or inguinal adenopathy        ASSESSMENT/PLAN:   1. Routine general medical examination at a health care facility  Reviewed recommended screenings and ordered appropriate testing for pt's risks and per pt's request(s).     - Pap imaged thin layer screen only - recommended age 21 - 24 " "years    2. Need for hepatitis C screening test  declined    3. Screening for STDs (sexually transmitted diseases)  Declined states multiple year monogamous relationship.    4. Screening for malignant neoplasm of cervix  complete  - Pap imaged thin layer screen only - recommended age 21 - 24 years    5. Nevus, non-neoplastic  Recommend follow-up with specialty patient verbalized understanding referral placed and patient will contact for appointment.  - DERMATOLOGY ADULT REFERRAL; Future    Patient has been advised of split billing requirements and indicates understanding: Yes  COUNSELING:  Reviewed preventive health counseling, as reflected in patient instructions       Regular exercise       Healthy diet/nutrition       Vision screening       Alcohol Use       Contraception       Family planning       Safe sex practices/STD prevention    Estimated body mass index is 25.35 kg/m  as calculated from the following:    Height as of this encounter: 1.56 m (5' 1.42\").    Weight as of this encounter: 61.7 kg (136 lb).        She reports that she has never smoked. She has never used smokeless tobacco.      Counseling Resources:  ATP IV Guidelines  Pooled Cohorts Equation Calculator  Breast Cancer Risk Calculator  BRCA-Related Cancer Risk Assessment: FHS-7 Tool  FRAX Risk Assessment  ICSI Preventive Guidelines  Dietary Guidelines for Americans, 2010  USDA's MyPlate  ASA Prophylaxis  Lung CA Screening    JAMES Rivas CNP  [facility] [place]  "

## 2021-01-13 LAB
COPATH REPORT: NORMAL
PAP: NORMAL

## 2021-03-18 ENCOUNTER — OFFICE VISIT (OUTPATIENT)
Dept: DERMATOLOGY | Facility: CLINIC | Age: 26
End: 2021-03-18
Attending: NURSE PRACTITIONER
Payer: COMMERCIAL

## 2021-03-18 DIAGNOSIS — D22.9 COMPOUND NEVUS: Primary | ICD-10-CM

## 2021-03-18 PROCEDURE — 99203 OFFICE O/P NEW LOW 30 MIN: CPT | Performed by: DERMATOLOGY

## 2021-03-18 NOTE — LETTER
3/18/2021         RE: Donald Duarte  [address] [facility] [address]        Dear Colleague,    Thank you for referring your patient, Donald Duarte, to the [facility]. Please see a copy of my visit note below.    [facility] Dermatology Note  Encounter Date: Mar 18, 2021  Office Visit     Dermatology Problem List:  1. None    Social history:   Family history: No family history of skin cancer.    ____________________________________________    Assessment & Plan:     1. Compound nevus inferior to the right brow. Patient is bothered cosmetically. No indications for medical removal.  - Patient counseled on removal techniques, including a shave or punch/excisional removal. Patient counseled on cost of removal for a cosmetic procedure (tier 2 plus pathology fee). Patient understands she will be left with a scar. She will think about this and call to schedule if she wishes to pursue.      Procedures Performed:   NA    Follow-up: per patient for removal    Staff and Scribe:     Scribe Disclosure  I, Lay Nix, am serving as a scribe to document services personally performed by Dr. Celia Toth MD, based on data collection and the provider's statements to me.     Provider Disclosure:   The documentation recorded by the scribe accurately reflects the services I personally performed and the decisions made by me.    Celia Toth MD    Department of Dermatology  [facility]: Phone: [phone], Fax:[phone]  [facility]: Phone: [phone], Fax: [phone]    ____________________________________________    CC: Derm Problem (Spot check below right eyebrow. No personal or family history of skin cancer.)    HPI:  Ms. Donald Duarte is a(n) 25 year old female who presents today as a new patient for spot  check.    She is new to our department. She has not seen a dermatologist prior. She has no history of skin cancer, atypical moles, or precancerous lesions to her knowledge.     Referred by PCP for nevus, non-neoplastic on 1/11/21. Note reviewed. Mole of concerns was located underneath the right eyebrow.    Today, patient notes concerns about a spot below the right eyebrow that has been there for as long as she can remember. No bleeding or pain at the sight. Bothers her cosmetically.     Patient is otherwise feeling well, without additional skin concerns.     Labs Reviewed:  N/A    Physical Exam:  Vitals: There were no vitals taken for this visit.  SKIN: Focused examination of the face was performed.  - Man Type II  - Regular brown pigmented papule inferior to the right brow   - No other lesions of concern on areas examined.       Medications:  Current Outpatient Medications   Medication     levonorgestrel (MIRENA) 20 MCG/24HR IUD     loratadine (CLARITIN) 10 MG tablet     Phenylephrine-Polyvinyl Alc (REFRESH REDNESS RELIEF) 0.12-1.4 % SOLN     VITAMIN E PO     sertraline (ZOLOFT) 50 MG tablet     No current facility-administered medications for this visit.       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        Again, thank you for allowing me to participate in the care of your patient.        Sincerely,        Celia Toth MD

## 2021-03-18 NOTE — NURSING NOTE
Donald Duarte's goals for this visit include:   Chief Complaint   Patient presents with     Derm Problem     Spot check below right eyebrow. No personal or family history of skin cancer.       She requests these members of her care team be copied on today's visit information:     PCP: Nellie Wang    Referring Provider:  JAMES Durbin CNP  [address] [place] [address]    There were no vitals taken for this visit.    Do you need any medication refills at today's visit? No  Renetta Tanner, [facility]

## 2021-03-18 NOTE — PROGRESS NOTES
[facility] Dermatology Note  Encounter Date: Mar 18, 2021  Office Visit     Dermatology Problem List:  1. None    Social history:   Family history: No family history of skin cancer.    ____________________________________________    Assessment & Plan:     1. Compound nevus inferior to the right brow. Patient is bothered cosmetically. No indications for medical removal.  - Patient counseled on removal techniques, including a shave or punch/excisional removal. Patient counseled on cost of removal for a cosmetic procedure (tier 2 plus pathology fee). Patient understands she will be left with a scar. She will think about this and call to schedule if she wishes to pursue.      Procedures Performed:   NA    Follow-up: per patient for removal    Staff and Scribe:     Scribe Disclosure  I, Lay Nix, am serving as a scribe to document services personally performed by Dr. Celia Toth MD, based on data collection and the provider's statements to me.     Provider Disclosure:   The documentation recorded by the scribe accurately reflects the services I personally performed and the decisions made by me.    Celia Toth MD    Department of Dermatology  [facility]: Phone: [phone], Fax:[phone]  [facility]: Phone: [phone], Fax: [phone]    ____________________________________________    CC: Derm Problem (Spot check below right eyebrow. No personal or family history of skin cancer.)    HPI:  Ms. Donald Duarte is a(n) 25 year old female who presents today as a new patient for spot check.    She is new to our department. She has not seen a dermatologist prior. She has no history of skin cancer, atypical moles, or precancerous lesions to her knowledge.     Referred by PCP for nevus, non-neoplastic on 1/11/21. Note reviewed. Mole of concerns was  located underneath the right eyebrow.    Today, patient notes concerns about a spot below the right eyebrow that has been there for as long as she can remember. No bleeding or pain at the sight. Bothers her cosmetically.     Patient is otherwise feeling well, without additional skin concerns.     Labs Reviewed:  N/A    Physical Exam:  Vitals: There were no vitals taken for this visit.  SKIN: Focused examination of the face was performed.  - Man Type II  - Regular brown pigmented papule inferior to the right brow   - No other lesions of concern on areas examined.       Medications:  Current Outpatient Medications   Medication     levonorgestrel (MIRENA) 20 MCG/24HR IUD     loratadine (CLARITIN) 10 MG tablet     Phenylephrine-Polyvinyl Alc (REFRESH REDNESS RELIEF) 0.12-1.4 % SOLN     VITAMIN E PO     sertraline (ZOLOFT) 50 MG tablet     No current facility-administered medications for this visit.       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

## 2021-08-25 DIAGNOSIS — R05.9 COUGH: ICD-10-CM

## 2021-08-25 NOTE — LETTER
[address] [address]  Phone: [phone]  Fax: [phone]        August 30, 2021      Donald Duarte                                                                                                                                [address] [facility] [address]            Dear Ms. Duarte,    We are concerned about your health care.  We recently provided you with a medication refill.  Many medications require routine follow-up with your Doctor.      At this time we ask that: You schedule a routine office visit with your physician to follow your Loratadine/Claritin    Your prescription: Has been refilled for 1 month so you may have time for the above noted follow-up.      Thank you,      Your [facility] Team

## 2021-08-26 NOTE — TELEPHONE ENCOUNTER
Pending Prescriptions:                       Disp   Refills    loratadine (CLARITIN) 10 MG tablet [Pharma*30 tab*0        Sig: TAKE ONE TABLET BY MOUTH ONE TIME DAILY       Routing refill request to provider for review/approval because:  A break in medication- 2 months given 9/2020    Piper Root RN on 8/26/2021 at 4:24 PM

## 2021-08-27 RX ORDER — LORATADINE 10 MG/1
TABLET ORAL
Qty: 90 TABLET | Refills: 0 | Status: SHIPPED | OUTPATIENT
Start: 2021-08-27 | End: 2022-09-26

## 2021-09-21 NOTE — PROGRESS NOTES
Assessment & Plan   1. Ingrown toenail of right foot: Ingrown toe.  Given discharge possible right pinky.  Offered Keflex as below.  Referral to podiatry as it was too small to pack with cotton ball in the office here today.  Encourage continued soaking.  Patient agreeable plan.  - Orthopedic  Referral; Future    2. Paronychia of toe, right  - cephALEXin (KEFLEX) 500 MG capsule; Take 1 capsule (500 mg) by mouth 3 times daily for 5 days  Dispense: 15 capsule; Refill: 0    3. Need for prophylactic vaccination and inoculation against influenza  - INFLUENZA VACCINE IM > 6 MONTHS VALENT IIV4 (AFLURIA/FLUZONE)      Return in about 2 weeks (around 10/7/2021), or if symptoms worsen or fail to improve.    Benjamin Hernandez MD  [facility]    This chart is completed utilizing dictation software; typos and/or incorrect word substitutions may unintentionally occur.      Flynn Bennett is a 26 year old who presents for the following health issues  accompanied by her self:    History of Present Illness       She eats 0-1 servings of fruits and vegetables daily.She consumes 0 sweetened beverage(s) daily.She exercises with enough effort to increase her heart rate 30 to 60 minutes per day.  She exercises with enough effort to increase her heart rate 5 days per week.   She is taking medications regularly.       Musculoskeletal problem/pain  Onset/Duration: 1 month  Description  Location: Toe - right  Joint Swelling: YES  Redness: YES  Pain: YES  Warmth: YES  Intensity:  7/10  Progression of Symptoms:  worsening  Accompanying signs and symptoms:   Fevers: no  Numbness/tingling/weakness: YES  History  Trauma to the area: no  Recent illness:  no  Previous similar problem: no  Previous evaluation:  no  Precipitating or alleviating factors:  Aggravating factors include: walking   Therapies tried and outcome: soaking    Patient states she has been having pain in her right toe for proxy 1  "month.  She did clip it but did not think it was too ingrown.  Has noticed some bloody discharge and increasing pain and redness to the area.  No pus or fevers noted.  She has been soaking it but no other interventions.    Review of Systems   Constitutional, MSK, Derm, neuro, cardiovascular, pulmonary, gi systems are negative, except as otherwise noted.      Objective    /76 (BP Location: Left arm, Patient Position: Chair, Cuff Size: Adult Regular)   Pulse 95   Temp 98.2  F (36.8  C) (Temporal)   Resp 16   Ht 1.562 m (5' 1.5\")   Wt 62.1 kg (137 lb)   LMP  (Exact Date)   SpO2 98%   Breastfeeding No   BMI 25.47 kg/m    Body mass index is 25.47 kg/m .  Physical Exam   General: Appears well and in no acute distress.  Cardiovascular: Regular rate and rhythm, normal S1 and S2 without murmur. No extra heartsounds or friction rub. Radial pulses present and equal bilaterally.  Respiratory: Lungs clear to auscultation bilaterally. No wheezing or crackles. No prolonged expiration. Symmetrical chest rise.  Musculoskeletal: Ingrown right middle toenail on the medial aspect.  Surrounding erythema of the skin. No gross extremity deformities. No peripheral edema. Normal muscle bulk.     Labs: None          If you checked off any problems, how difficult have these problems made it for you to do your work, take care of things at home, or get along with other people?: Not difficult at all  PHQ9 TOTAL SCORE: 2  DENISE 7 TOTAL SCORE: 4      "

## 2021-09-23 ENCOUNTER — OFFICE VISIT (OUTPATIENT)
Dept: FAMILY MEDICINE | Facility: CLINIC | Age: 26
End: 2021-09-23
Payer: COMMERCIAL

## 2021-09-23 VITALS
TEMPERATURE: 98.2 F | OXYGEN SATURATION: 98 % | RESPIRATION RATE: 16 BRPM | HEART RATE: 95 BPM | SYSTOLIC BLOOD PRESSURE: 120 MMHG | BODY MASS INDEX: 25.21 KG/M2 | HEIGHT: 62 IN | WEIGHT: 137 LBS | DIASTOLIC BLOOD PRESSURE: 76 MMHG

## 2021-09-23 DIAGNOSIS — Z23 NEED FOR PROPHYLACTIC VACCINATION AND INOCULATION AGAINST INFLUENZA: ICD-10-CM

## 2021-09-23 DIAGNOSIS — L03.031 PARONYCHIA OF TOE, RIGHT: ICD-10-CM

## 2021-09-23 DIAGNOSIS — L60.0 INGROWN TOENAIL OF RIGHT FOOT: Primary | ICD-10-CM

## 2021-09-23 PROCEDURE — 90686 IIV4 VACC NO PRSV 0.5 ML IM: CPT | Performed by: FAMILY MEDICINE

## 2021-09-23 PROCEDURE — 99213 OFFICE O/P EST LOW 20 MIN: CPT | Mod: 25 | Performed by: FAMILY MEDICINE

## 2021-09-23 PROCEDURE — 90471 IMMUNIZATION ADMIN: CPT | Performed by: FAMILY MEDICINE

## 2021-09-23 RX ORDER — CEPHALEXIN 500 MG/1
500 CAPSULE ORAL 3 TIMES DAILY
Qty: 15 CAPSULE | Refills: 0 | Status: SHIPPED | OUTPATIENT
Start: 2021-09-23 | End: 2021-09-28

## 2021-09-23 ASSESSMENT — ANXIETY QUESTIONNAIRES
4. TROUBLE RELAXING: NOT AT ALL
6. BECOMING EASILY ANNOYED OR IRRITABLE: SEVERAL DAYS
5. BEING SO RESTLESS THAT IT IS HARD TO SIT STILL: NOT AT ALL
7. FEELING AFRAID AS IF SOMETHING AWFUL MIGHT HAPPEN: NOT AT ALL
7. FEELING AFRAID AS IF SOMETHING AWFUL MIGHT HAPPEN: NOT AT ALL
3. WORRYING TOO MUCH ABOUT DIFFERENT THINGS: SEVERAL DAYS
1. FEELING NERVOUS, ANXIOUS, OR ON EDGE: SEVERAL DAYS
8. IF YOU CHECKED OFF ANY PROBLEMS, HOW DIFFICULT HAVE THESE MADE IT FOR YOU TO DO YOUR WORK, TAKE CARE OF THINGS AT HOME, OR GET ALONG WITH OTHER PEOPLE?: NOT DIFFICULT AT ALL
GAD7 TOTAL SCORE: 4
GAD7 TOTAL SCORE: 4
2. NOT BEING ABLE TO STOP OR CONTROL WORRYING: SEVERAL DAYS
GAD7 TOTAL SCORE: 4

## 2021-09-23 ASSESSMENT — PAIN SCALES - GENERAL: PAINLEVEL: SEVERE PAIN (7)

## 2021-09-23 ASSESSMENT — MIFFLIN-ST. JEOR: SCORE: 1311.74

## 2021-09-23 ASSESSMENT — PATIENT HEALTH QUESTIONNAIRE - PHQ9
SUM OF ALL RESPONSES TO PHQ QUESTIONS 1-9: 2
SUM OF ALL RESPONSES TO PHQ QUESTIONS 1-9: 2
10. IF YOU CHECKED OFF ANY PROBLEMS, HOW DIFFICULT HAVE THESE PROBLEMS MADE IT FOR YOU TO DO YOUR WORK, TAKE CARE OF THINGS AT HOME, OR GET ALONG WITH OTHER PEOPLE: NOT DIFFICULT AT ALL

## 2021-09-23 NOTE — PATIENT INSTRUCTIONS
Patient Education     Paronychia of the Finger or Toe  Paronychia is an infection near a fingernail or toenail. It usually occurs when an opening in the cuticle or an ingrown toenail lets bacteria under the skin.   The infection will need to be drained if pus is present. If the infection has been caught early, you may need only antibiotic treatment. Healing will take about 1 to 2 weeks.   Home care  Follow these guidelines when caring for yourself at home:     Clean and soak the toe or finger. Do this 2 times a day for the first 3 days. To do so:  ? Soak your foot or hand in a tub of warm water for 5 minutes. Or hold your toe or finger under a faucet of warm running water for 5 minutes.  ? Clean any crust away with soap and water using a cotton swab.  ? Put antibiotic ointment on the infected area.    Change the dressing daily or any time it gets dirty.    If you were given antibiotics, take them as directed until they are all gone.    If your infection is on a toe, wear comfortable shoes with a lot of toe room. You can also wear open-toed sandals while your toe heals.    You may use over-the-counter medicine (acetaminophen or ibuprofen) to help with pain, unless another medicine was prescribed. If you have chronic liver or kidney disease, talk with your healthcare provider before using these medicines. Also talk with your provider if you've had a stomach ulcer or gastrointestinal bleeding.  Prevention  The following can prevent paronychia:     Don't cut or play with your cuticles at home. A healthy cuticle maintains a seal between your skin and nail and keeps out infection.    Don't bite your nails.    Don't suck on your thumbs or fingers.  Follow-up care  Follow up with your healthcare provider, or as advised.   When to seek medical advice  Call your healthcare provider right away if any of these occur:     Redness, pain, or swelling of the finger or toe gets worse    You have trouble moving or bending the  finger or toe    Red streaks in the skin leading away from the wound    Pus or fluid draining from the nail area    Fever of 100.4 F (38 C) or higher, or as directed by your provider  Alma Rosa last reviewed this educational content on 7/1/2019 2000-2021 [employer]. All rights reserved. This information is not intended as a substitute for professional medical care. Always follow your healthcare professional's instructions.

## 2021-09-24 ASSESSMENT — PATIENT HEALTH QUESTIONNAIRE - PHQ9: SUM OF ALL RESPONSES TO PHQ QUESTIONS 1-9: 2

## 2021-09-24 ASSESSMENT — ANXIETY QUESTIONNAIRES: GAD7 TOTAL SCORE: 4

## 2021-10-18 NOTE — PROGRESS NOTES
Assessment & Plan     Attention deficit hyperactivity disorder (ADHD), combined type  26-year-old female with history of ADHD and anxiety.  She recently restarted school as a , has noticed increasing difficulty concentrating, along with subsequent anxiety.  We discussed possibility of ADHD exacerbated anxiety, or vice versa.  She did have good effect with taking Adderall in the past.  She was taking 20 mg extended release in the morning, 10 mg immediate release in the afternoon.  However she was having difficulty falling asleep at night.  We will restart the 20 mg a day and decrease the afternoon dose to 5 mg.  She will message me in 1 week to advise me of progress.    DENISE (generalized anxiety disorder)  She will monitor her anxiety closely.  She was previously started on sertraline, however patient did not like the way her made her feel when she had occasional drink.  So she stopped that.  Really feels safe without suicidal or homicidal ideation.  - amphetamine-dextroamphetamine (ADDERALL XR) 20 MG 24 hr capsule; Take 1 capsule (20 mg) by mouth every morning With food  - amphetamine-dextroamphetamine (ADDERALL) 5 MG tablet; Take 1 tablet (5 mg) by mouth daily At 1pm           Return in about 4 weeks (around 11/16/2021) for Follow Up from Today's Encounter.    Timothy Luna MD  [facility] [place] Bennett is a 26 year old who presents for the following health issues     History of Present Illness       Mental Health Follow-up:  Patient presents to follow-up on Anxiety.    Patient's anxiety since last visit has been:  No change  The patient is not having other symptoms associated with anxiety.  Any significant life events: No  Patient is not feeling anxious or having panic attacks.  Patient has no concerns about alcohol or drug use. (Drinks on occasion, stopped taking zoloft)     Social History  Tobacco Use    Smoking status: Never Smoker    Smokeless tobacco: Never  "Used  Vaping Use    Vaping Use: Never used  Alcohol use: No  Drug use: No      Today's PHQ-9         PHQ-9 Total Score:     (P) 5   PHQ-9 Q9 Thoughts of better off dead/self-harm past 2 weeks :   (P) Not at all   Thoughts of suicide or self harm:      Self-harm Plan:        Self-harm Action:          Safety concerns for self or others:           She eats 0-1 servings of fruits and vegetables daily.She consumes 0 sweetened beverage(s) daily.She exercises with enough effort to increase her heart rate 20 to 29 minutes per day.  She exercises with enough effort to increase her heart rate 5 days per week.   She is taking medications regularly.     Answers for HPI/ROS submitted by the patient on 10/19/2021  If you checked off any problems, how difficult have these problems made it for you to do your work, take care of things at home, or get along with other people?: Somewhat difficult  PHQ9 TOTAL SCORE: 5  DENISE 7 TOTAL SCORE: 5        Review of Systems   Constitutional, HEENT, cardiovascular, pulmonary, gi and gu systems are negative, except as otherwise noted.      Objective    /72   Pulse 84   Temp 98  F (36.7  C) (Temporal)   Resp 14   Ht 1.562 m (5' 1.5\")   Wt 58.3 kg (128 lb 8 oz)   BMI 23.89 kg/m    Body mass index is 23.89 kg/m .  Physical Exam   GENERAL: healthy, alert and no distress  EYES: Eyes grossly normal to inspection, PERRL and conjunctivae and sclerae normal  HENT: ear canals and TM's normal, nose and mouth without ulcers or lesions  NECK: no adenopathy, no asymmetry, masses, or scars and thyroid normal to palpation  RESP: lungs clear to auscultation - no rales, rhonchi or wheezes  CV: regular rate and rhythm, normal S1 S2, no S3 or S4, no murmur, click or rub, no peripheral edema and peripheral pulses strong  MS: no gross musculoskeletal defects noted, no edema  SKIN: no suspicious lesions or rashes  NEURO: Normal strength and tone, mentation intact and speech normal  PSYCH: mentation appears " normal, affect normal/bright

## 2021-10-19 ENCOUNTER — OFFICE VISIT (OUTPATIENT)
Dept: FAMILY MEDICINE | Facility: CLINIC | Age: 26
End: 2021-10-19
Payer: COMMERCIAL

## 2021-10-19 VITALS
RESPIRATION RATE: 14 BRPM | BODY MASS INDEX: 24.26 KG/M2 | DIASTOLIC BLOOD PRESSURE: 72 MMHG | HEIGHT: 61 IN | HEART RATE: 84 BPM | SYSTOLIC BLOOD PRESSURE: 110 MMHG | TEMPERATURE: 98 F | WEIGHT: 128.5 LBS

## 2021-10-19 DIAGNOSIS — F90.2 ATTENTION DEFICIT HYPERACTIVITY DISORDER (ADHD), COMBINED TYPE: Primary | ICD-10-CM

## 2021-10-19 DIAGNOSIS — F41.1 GAD (GENERALIZED ANXIETY DISORDER): ICD-10-CM

## 2021-10-19 PROCEDURE — 99214 OFFICE O/P EST MOD 30 MIN: CPT | Performed by: FAMILY MEDICINE

## 2021-10-19 RX ORDER — DEXTROAMPHETAMINE SACCHARATE, AMPHETAMINE ASPARTATE MONOHYDRATE, DEXTROAMPHETAMINE SULFATE AND AMPHETAMINE SULFATE 5; 5; 5; 5 MG/1; MG/1; MG/1; MG/1
20 CAPSULE, EXTENDED RELEASE ORAL EVERY MORNING
Qty: 30 CAPSULE | Refills: 0 | Status: SHIPPED | OUTPATIENT
Start: 2021-10-19 | End: 2021-11-26

## 2021-10-19 RX ORDER — DEXTROAMPHETAMINE SACCHARATE, AMPHETAMINE ASPARTATE, DEXTROAMPHETAMINE SULFATE AND AMPHETAMINE SULFATE 1.25; 1.25; 1.25; 1.25 MG/1; MG/1; MG/1; MG/1
5 TABLET ORAL DAILY
Qty: 30 TABLET | Refills: 0 | Status: SHIPPED | OUTPATIENT
Start: 2021-10-19 | End: 2021-11-26

## 2021-10-19 ASSESSMENT — ANXIETY QUESTIONNAIRES
6. BECOMING EASILY ANNOYED OR IRRITABLE: NOT AT ALL
2. NOT BEING ABLE TO STOP OR CONTROL WORRYING: SEVERAL DAYS
8. IF YOU CHECKED OFF ANY PROBLEMS, HOW DIFFICULT HAVE THESE MADE IT FOR YOU TO DO YOUR WORK, TAKE CARE OF THINGS AT HOME, OR GET ALONG WITH OTHER PEOPLE?: SOMEWHAT DIFFICULT
5. BEING SO RESTLESS THAT IT IS HARD TO SIT STILL: SEVERAL DAYS
GAD7 TOTAL SCORE: 5
3. WORRYING TOO MUCH ABOUT DIFFERENT THINGS: SEVERAL DAYS
7. FEELING AFRAID AS IF SOMETHING AWFUL MIGHT HAPPEN: NOT AT ALL
7. FEELING AFRAID AS IF SOMETHING AWFUL MIGHT HAPPEN: NOT AT ALL
GAD7 TOTAL SCORE: 5
1. FEELING NERVOUS, ANXIOUS, OR ON EDGE: SEVERAL DAYS
GAD7 TOTAL SCORE: 5
4. TROUBLE RELAXING: SEVERAL DAYS

## 2021-10-19 ASSESSMENT — PATIENT HEALTH QUESTIONNAIRE - PHQ9
SUM OF ALL RESPONSES TO PHQ QUESTIONS 1-9: 5
SUM OF ALL RESPONSES TO PHQ QUESTIONS 1-9: 5
10. IF YOU CHECKED OFF ANY PROBLEMS, HOW DIFFICULT HAVE THESE PROBLEMS MADE IT FOR YOU TO DO YOUR WORK, TAKE CARE OF THINGS AT HOME, OR GET ALONG WITH OTHER PEOPLE: SOMEWHAT DIFFICULT

## 2021-10-19 ASSESSMENT — MIFFLIN-ST. JEOR: SCORE: 1273.12

## 2021-10-20 ASSESSMENT — PATIENT HEALTH QUESTIONNAIRE - PHQ9: SUM OF ALL RESPONSES TO PHQ QUESTIONS 1-9: 5

## 2021-10-20 ASSESSMENT — ANXIETY QUESTIONNAIRES: GAD7 TOTAL SCORE: 5

## 2021-10-28 ENCOUNTER — MYC MEDICAL ADVICE (OUTPATIENT)
Dept: FAMILY MEDICINE | Facility: CLINIC | Age: 26
End: 2021-10-28

## 2021-12-28 DIAGNOSIS — F90.2 ATTENTION DEFICIT HYPERACTIVITY DISORDER (ADHD), COMBINED TYPE: ICD-10-CM

## 2021-12-28 NOTE — LETTER
[facility]  [address], [address]  [place] [address]  Phone: [phone]  Fax: [phone]        December 30, 2021      Donald Duarte                                                                                                                                [address] [facility] [address]            Dear Ms. Duarte,    We are concerned about your health care.  We recently provided you with a medication refill.  Many medications require routine follow-up with your Doctor.      At this time we ask that: You schedule a routine office visit with your physician to follow your medications. Please call the clinic at [phone] option 1 to schedule.     Your prescription: ( Adderall) Has been refilled for 1 time so you may have time for the above noted follow-up.      Thank you,      Nellie Wang CNP  Care Team

## 2021-12-28 NOTE — TELEPHONE ENCOUNTER
Requested Prescriptions   Pending Prescriptions Disp Refills     amphetamine-dextroamphetamine (ADDERALL) 5 MG tablet [Pharmacy Med Name: Amphetamine-Dextroamphetamine Oral Tablet 5 MG] 30 tablet 0     Sig: TAKE ONE TABLET BY MOUTH ONE TIME DAILY AT 1 PM       There is no refill protocol information for this order        ADDERALL XR 20 MG 24 hr capsule [Pharmacy Med Name: Adderall XR Oral Capsule Extended Release 24 Hour 20 MG] 30 capsule 0     Sig: TAKE ONE CAPSULE BY MOUTH IN THE MORNING  with food       There is no refill protocol information for this order        Routing refill request to provider for review/approval because:  Drug not on the [facility] refill protocol     Thanks,  GRACE Neville  [facility]

## 2021-12-29 RX ORDER — DEXTROAMPHETAMINE SACCHARATE, AMPHETAMINE ASPARTATE, DEXTROAMPHETAMINE SULFATE AND AMPHETAMINE SULFATE 1.25; 1.25; 1.25; 1.25 MG/1; MG/1; MG/1; MG/1
TABLET ORAL
Qty: 30 TABLET | Refills: 0 | Status: SHIPPED | OUTPATIENT
Start: 2021-12-29 | End: 2022-02-04

## 2021-12-29 RX ORDER — DEXTROAMPHETAMINE SULFATE, DEXTROAMPHETAMINE SACCHARATE, AMPHETAMINE SULFATE AND AMPHETAMINE ASPARTATE 5; 5; 5; 5 MG/1; MG/1; MG/1; MG/1
CAPSULE, EXTENDED RELEASE ORAL
Qty: 30 CAPSULE | Refills: 0 | Status: SHIPPED | OUTPATIENT
Start: 2021-12-29 | End: 2022-02-04

## 2021-12-29 NOTE — TELEPHONE ENCOUNTER
"Arelis refill given will need ov for further refills per last ov     \"Return in about 4 weeks (around 11/16/2021) for Follow Up from Today's Encounter\"     reviewed.     Nellie Wang CNP       "

## 2022-01-18 ENCOUNTER — OFFICE VISIT (OUTPATIENT)
Dept: FAMILY MEDICINE | Facility: CLINIC | Age: 27
End: 2022-01-18
Payer: COMMERCIAL

## 2022-01-18 VITALS
TEMPERATURE: 98.8 F | HEART RATE: 104 BPM | HEIGHT: 61 IN | DIASTOLIC BLOOD PRESSURE: 84 MMHG | WEIGHT: 132.5 LBS | SYSTOLIC BLOOD PRESSURE: 125 MMHG | RESPIRATION RATE: 16 BRPM | BODY MASS INDEX: 25.02 KG/M2 | OXYGEN SATURATION: 96 %

## 2022-01-18 DIAGNOSIS — R30.0 DYSURIA: ICD-10-CM

## 2022-01-18 DIAGNOSIS — N30.01 ACUTE CYSTITIS WITH HEMATURIA: Primary | ICD-10-CM

## 2022-01-18 LAB
ALBUMIN UR-MCNC: 100 MG/DL
APPEARANCE UR: ABNORMAL
BACTERIA #/AREA URNS HPF: ABNORMAL /HPF
BILIRUB UR QL STRIP: ABNORMAL
COLOR UR AUTO: YELLOW
GLUCOSE UR STRIP-MCNC: NEGATIVE MG/DL
HGB UR QL STRIP: ABNORMAL
KETONES UR STRIP-MCNC: ABNORMAL MG/DL
LEUKOCYTE ESTERASE UR QL STRIP: ABNORMAL
NITRATE UR QL: POSITIVE
PH UR STRIP: 5.5 [PH] (ref 5–7)
RBC #/AREA URNS AUTO: ABNORMAL /HPF
SP GR UR STRIP: >=1.03 (ref 1–1.03)
SQUAMOUS #/AREA URNS AUTO: ABNORMAL /LPF
UROBILINOGEN UR STRIP-ACNC: 0.2 E.U./DL
WBC #/AREA URNS AUTO: >100 /HPF

## 2022-01-18 PROCEDURE — 87086 URINE CULTURE/COLONY COUNT: CPT | Performed by: NURSE PRACTITIONER

## 2022-01-18 PROCEDURE — 99213 OFFICE O/P EST LOW 20 MIN: CPT | Performed by: NURSE PRACTITIONER

## 2022-01-18 PROCEDURE — 81001 URINALYSIS AUTO W/SCOPE: CPT | Performed by: NURSE PRACTITIONER

## 2022-01-18 PROCEDURE — 87186 SC STD MICRODIL/AGAR DIL: CPT | Performed by: NURSE PRACTITIONER

## 2022-01-18 RX ORDER — NITROFURANTOIN 25; 75 MG/1; MG/1
100 CAPSULE ORAL 2 TIMES DAILY
Qty: 14 CAPSULE | Refills: 0 | Status: SHIPPED | OUTPATIENT
Start: 2022-01-18 | End: 2022-01-25

## 2022-01-18 ASSESSMENT — MIFFLIN-ST. JEOR: SCORE: 1278.78

## 2022-01-18 ASSESSMENT — PAIN SCALES - GENERAL: PAINLEVEL: SEVERE PAIN (6)

## 2022-01-18 NOTE — PROGRESS NOTES
"  Assessment & Plan     Acute cystitis with hematuria  Counseled on self-care measures including: OTC AZO for 3 days, stay well hydrated with water; and warning signs of when to seek urgent medical care including: fever, symptoms that worsen or do not resolve.  - nitroFURantoin macrocrystal-monohydrate (MACROBID) 100 MG capsule; Take 1 capsule (100 mg) by mouth 2 times daily for 7 days    Dysuria  - UA reflex to Microscopic and Culture  - Urine Microscopic Exam  - Urine Culture    See Patient Instructions    Return in about 1 week (around 1/25/2022), or if symptoms worsen or fail to improve.    JAMES Padilla CNP  [facility] [place] Bennett is a 26 year old who presents for the following health issues     HPI     Genitourinary - Female  Onset/Duration: 1-2 weeks   Description:   Painful urination (Dysuria): YES           Frequency: YES  Blood in urine (Hematuria): YES  Delay in urine (Hesitency): YES  Intensity: moderate   Progression of Symptoms:  worsening  Accompanying Signs & Symptoms:  Fever/chills: no  Flank pain: YES  Nausea and vomiting: no  Vaginal symptoms: odor  Abdominal/Pelvic Pain: YES, lots of cramping   History:   History of frequent UTI s: no  History of kidney stones: no  Sexually Active: YES  Possibility of pregnancy: No  Therapies tried and outcome: Cranberry PO; no effect.     Review of Systems   Constitutional, HEENT, cardiovascular, pulmonary, gi and gu systems are negative, except as otherwise noted.      Objective    /84   Pulse 104   Temp 98.8  F (37.1  C) (Oral)   Resp 16   Ht 1.542 m (5' 0.71\")   Wt 60.1 kg (132 lb 8 oz)   SpO2 96%   BMI 25.28 kg/m    Body mass index is 25.28 kg/m .  Physical Exam   GENERAL: healthy, alert and no distress  EYES: Eyes grossly normal to inspection, PERRL and conjunctivae and sclerae normal  PSYCH: mentation appears normal, affect normal/bright    Results for orders placed or performed in visit on " 01/18/22 (from the past 24 hour(s))   UA reflex to Microscopic and Culture    Specimen: Urine, Midstream   Result Value Ref Range    Color Urine Yellow Colorless, Straw, Light Yellow, Yellow    Appearance Urine Cloudy (A) Clear    Glucose Urine Negative Negative mg/dL    Bilirubin Urine Small (A) Negative    Ketones Urine Trace (A) Negative mg/dL    Specific Gravity Urine >=1.030 1.003 - 1.035    Blood Urine Large (A) Negative    pH Urine 5.5 5.0 - 7.0    Protein Albumin Urine 100  (A) Negative mg/dL    Urobilinogen Urine 0.2 0.2, 1.0 E.U./dL    Nitrite Urine Positive (A) Negative    Leukocyte Esterase Urine Small (A) Negative   Urine Microscopic Exam   Result Value Ref Range    Bacteria Urine Moderate (A) None Seen /HPF    RBC Urine 10-25 (A) 0-2 /HPF /HPF    WBC Urine >100 (A) 0-5 /HPF /HPF    Squamous Epithelials Urine Few (A) None Seen /LPF

## 2022-01-18 NOTE — PATIENT INSTRUCTIONS
You may use AZO to help with symptoms the first 3 days.       Patient Education     Bladder Infection, Female (Adult)     Urine normally doesn't have any germs (bacteria) in it. But bacteria can get into the urinary tract from the skin around the rectum. Or they can travel in the blood from other parts of the body. Once they are in your urinary tract, they can cause infection in these areas:    The urethra (urethritis)    The bladder (cystitis)    The kidneys (pyelonephritis)  The most common place for an infection is in the bladder. This is called a bladder infection. This is one of the most common infections in women. Most bladder infections are easily treated. They are not serious unless the infection spreads to the kidney.  The terms bladder infection, UTI, and cystitis are often used to describe the same thing. But they are not always the same. Cystitis is an inflammation of the bladder. The most common cause of cystitis is an infection.  Symptoms  The infection causes inflammation in the urethra and bladder. This causes many of the symptoms. The most common symptoms of a bladder infection are:    Pain or burning when urinating    Having to urinate more often than normal    Urgent need to urinate    Only a small amount of urine comes out    Blood in urine    Belly (abdominal) discomfort. This is often in the lower belly above the pubic bone.    Cloudy urine    Strong- or bad-smelling urine    Unable to urinate (urinary retention)    Unable to hold urine in (urinary incontinence)    Fever    Loss of appetite    Confusion (in older adults)  Causes  Bladder infections are not contagious. You can't get one from someone else, from a toilet seat, or from sharing a bath.  The most common cause of bladder infections is bacteria from the bowels. The bacteria get onto the skin around the opening of the urethra. From there, they can get into the urine. Then they travel up to the bladder, causing inflammation and  infection. This often happens because of:    Wiping incorrectly after urinating. Always wipe from front to back.    Bowel incontinence    Pregnancy    Procedures such as having a catheter put in    Older age    Not emptying your bladder. This can give bacteria a chance to grow in your urine.    Fluid loss (dehydration)    Constipation    Having sex    Using a diaphragm for birth control   Treatment  Bladder infections are diagnosed by a urine test and urine culture. They are treated with antibiotics. They often clear up quickly without problems. Treatment helps prevent a more serious kidney infection.  Medicines  Medicines can help in the treatment of a bladder infection:    Take antibiotics until they are used up, even if you feel better. It's important to finish them to make sure the infection has cleared.    You can use acetaminophen or ibuprofen for pain, fever, or discomfort, unless another medicine was prescribed. If you have long-term (chronic) liver or kidney disease, talk with your healthcare provider before using these medicines. Also talk with your provider if you've ever had a stomach ulcer or GI (gastrointestinal) bleeding, or are taking blood-thinner medicines.    If you are given phenazopydridine to reduce burning with urination, it will make your urine a bright orange color. This can stain clothing.  Care and prevention  These self-care steps can help prevent future infections:    Drink plenty of fluids. This helps to prevent dehydration and flush out your bladder. Do this unless you must restrict fluids for other health reasons, or your healthcare provider told you not to.    Clean yourself correctly after going to the bathroom. Wipe from front to back after using the toilet. This helps prevent the spread of bacteria.    Urinate more often. Don't try to hold urine in for a long time.    Wear loose-fitting clothes and cotton underwear. Don't wear tight-fitting pants.    Improve your diet and prevent  constipation. Eat more fresh fruits and vegetables, and fiber. Eat less junk foods and fatty foods.    Don't have sex until your symptoms are gone.    Don't have caffeine, alcohol, and spicy foods. These can irritate your bladder.    Urinate right after you have sex to flush out your bladder.    If you use birth control pills and have frequent bladder infections, discuss it with your healthcare provider.  Follow-up care  Call your healthcare provider if all symptoms are not gone after 3 days of treatment. This is especially important if you have repeat infections.  If a culture was done, you will be told if your treatment needs to be changed. If directed, you can call to find out the results.  If X-rays were done, you will be told if the results will affect your treatment.  Call 911  Call 911 if any of the following occur:    Trouble breathing    Hard to wake up or confusion    Fainting (loss of consciousness)    Fast heart rate  When to get medical advice  Call your healthcare provider right away if any of these occur:    Fever of 100.4 F (38.0 C) or higher, or as directed by your healthcare provider    Symptoms are not better after 3 days of treatment    Back or belly pain that gets worse    Repeated vomiting, or unable to keep medicine down    Weakness or dizziness    Vaginal discharge    Pain, redness, or swelling in the outer vaginal area (labia)  [employer] last reviewed this educational content on 11/1/2019 2000-2021 [employer]. All rights reserved. This information is not intended as a substitute for professional medical care. Always follow your healthcare professional's instructions.

## 2022-01-18 NOTE — LETTER
January 18, 2022      Donald Duarte  [address] [facility] [address]        To Whom It May Concern:    Donald Duarte  was seen on 1/18/2022.  Please excuse her today due to illness. Can return to work tomorrow.    Sincerely,        JAMES Padilla CNP

## 2022-01-18 NOTE — LETTER
January 21, 2022      Donald DAIN Gerald  [address] [facility] [address]        Dear ,    We are writing to inform you of your test results.    Culture and sensitivities of your urine shows that the antibiotic given for your infection should work well.     Please continue with your current plan of care and let us know if you have any questions or concerns.         Resulted Orders   UA reflex to Microscopic and Culture   Result Value Ref Range    Color Urine Yellow Colorless, Straw, Light Yellow, Yellow    Appearance Urine Cloudy (A) Clear    Glucose Urine Negative Negative mg/dL    Bilirubin Urine Small (A) Negative    Ketones Urine Trace (A) Negative mg/dL    Specific Gravity Urine >=1.030 1.003 - 1.035    Blood Urine Large (A) Negative    pH Urine 5.5 5.0 - 7.0    Protein Albumin Urine 100  (A) Negative mg/dL    Urobilinogen Urine 0.2 0.2, 1.0 E.U./dL    Nitrite Urine Positive (A) Negative    Leukocyte Esterase Urine Small (A) Negative   Urine Microscopic Exam   Result Value Ref Range    Bacteria Urine Moderate (A) None Seen /HPF    RBC Urine 10-25 (A) 0-2 /HPF /HPF    WBC Urine >100 (A) 0-5 /HPF /HPF    Squamous Epithelials Urine Few (A) None Seen /LPF   Urine Culture   Result Value Ref Range    Culture >100,000 CFU/mL Escherichia coli (A)        If you have any questions or concerns, please call the clinic at the number listed above.       Sincerely,      Eva Valderrama, APRN CNP/silva

## 2022-01-19 LAB — BACTERIA UR CULT: ABNORMAL

## 2022-02-02 NOTE — PROGRESS NOTES
"  Assessment & Plan     Attention deficit hyperactivity disorder (ADHD), combined type  26-year-old female with history of ADHD, controlling symptoms well, able to stay on task at work.  She has noticed some irritability when she does not take medication with food.  She will be more consistent about this.  - amphetamine-dextroamphetamine (ADDERALL) 5 MG tablet; TAKE ONE TABLET BY MOUTH ONE TIME DAILY AT 1 PM  - amphetamine-dextroamphetamine (ADDERALL XR) 20 MG 24 hr capsule; TAKE ONE CAPSULE BY MOUTH IN THE MORNING  with food    Constipation   Long history of constipation, uses occasional MiraLAX.  Recommended daily fiber supplementation, follow-up no improvement or any worsening.    IUD contraception  Patient IUD placed 5 years ago, needs replacement.  Will schedule.    Hepatitis B vaccination status unknown  According to records, she has only had 1 childhood hepatitis B vaccine, patient believes she is fully vaccinated, will get titer levels.  - Hepatitis B Surface Antibody; Future  - Hepatitis B Surface Antibody       BMI:   Estimated body mass index is 25.12 kg/m  as calculated from the following:    Height as of this encounter: 1.544 m (5' 0.79\").    Weight as of this encounter: 59.9 kg (132 lb).           Return in about 4 weeks (around 3/4/2022) for Annual Well Check.    Timothy Luna MD  [facility] ADELFO Bennett is a 26 year old who presents for the following health issues     History of Present Illness       Mental Health Follow-up:  Patient presents to follow-up on Anxiety.    Patient's anxiety since last visit has been:  Good        Patient has no concerns about alcohol or drug use.     Social History  Tobacco Use    Smoking status: Never Smoker    Smokeless tobacco: Never Used  Vaping Use    Vaping Use: Never used  Alcohol use: No  Drug use: No      Today's PHQ-9         PHQ-9 Total Score:     (P) 2   PHQ-9 Q9 Thoughts of better off dead/self-harm past 2 weeks :   (P) " "Not at all   Thoughts of suicide or self harm:      Self-harm Plan:        Self-harm Action:          Safety concerns for self or others:           She eats 0-1 servings of fruits and vegetables daily.She consumes 0 sweetened beverage(s) daily.She exercises with enough effort to increase her heart rate 9 or less minutes per day.  She exercises with enough effort to increase her heart rate 3 or less days per week.   She is taking medications regularly.     SUBJECTIVE:  Patient is here today to recheck ADHD/ADD.    Updates since last visit: going well  Routine for taking medicine, including time: 7am and 12p  Time medicine wears off: 1st around 12p and 2nd around 7pm  Issues at school/Work: doing well   Issues at home: doing well  Control of symptoms: good    Side effects:  Headaches: No   Stomach aches: No  Irritability/mood swings: No  Difficulties with sleep: No  Social withdrawal: No  Unusual movements/tics: Yes pulling/playing with hair  Decreased appetite: Yes     Other concerns: states she gets \"really cranky when it wears off\"      Talk about bowel movement concerns-constipation  Was on antibiotic and last dose about 4 days ago but was causing her dizziness which is still lingering improved, she will follow-up if no improvement or any worsening.  Is IUD needing to be removed/replaced placed January 2017      Review of Systems   Constitutional, HEENT, cardiovascular, pulmonary, gi and gu systems are negative, except as otherwise noted.      Objective    /66   Pulse 90   Temp 98.7  F (37.1  C) (Temporal)   Resp 12   Ht 1.544 m (5' 0.79\")   Wt 59.9 kg (132 lb)   LMP  (LMP Unknown)   SpO2 100%   BMI 25.12 kg/m    Body mass index is 25.12 kg/m .  Physical Exam   GENERAL: healthy, alert and no distress  EYES: Eyes grossly normal to inspection, PERRL and conjunctivae and sclerae normal  HENT: ear canals and TM's normal, nose and mouth without ulcers or lesions  NECK: no adenopathy, no asymmetry, masses, " or scars and thyroid normal to palpation  RESP: lungs clear to auscultation - no rales, rhonchi or wheezes  CV: regular rate and rhythm, normal S1 S2, no S3 or S4, no murmur, click or rub, no peripheral edema and peripheral pulses strong  ABDOMEN: soft, nontender, no hepatosplenomegaly, no masses and bowel sounds normal  MS: no gross musculoskeletal defects noted, no edema  NEURO: Normal strength and tone, mentation intact and speech normal  BACK: no CVA tenderness, no paralumbar tenderness  PSYCH: mentation appears normal, affect normal/bright                Answers for HPI/ROS submitted by the patient on 2/4/2022  If you checked off any problems, how difficult have these problems made it for you to do your work, take care of things at home, or get along with other people?: Not difficult at all  PHQ9 TOTAL SCORE: 2  DENISE 7 TOTAL SCORE: 5

## 2022-02-04 ENCOUNTER — TELEPHONE (OUTPATIENT)
Dept: FAMILY MEDICINE | Facility: CLINIC | Age: 27
End: 2022-02-04

## 2022-02-04 ENCOUNTER — OFFICE VISIT (OUTPATIENT)
Dept: FAMILY MEDICINE | Facility: CLINIC | Age: 27
End: 2022-02-04
Payer: COMMERCIAL

## 2022-02-04 VITALS
WEIGHT: 132 LBS | HEART RATE: 90 BPM | RESPIRATION RATE: 12 BRPM | HEIGHT: 61 IN | SYSTOLIC BLOOD PRESSURE: 104 MMHG | TEMPERATURE: 98.7 F | BODY MASS INDEX: 24.92 KG/M2 | OXYGEN SATURATION: 100 % | DIASTOLIC BLOOD PRESSURE: 66 MMHG

## 2022-02-04 DIAGNOSIS — F90.2 ATTENTION DEFICIT HYPERACTIVITY DISORDER (ADHD), COMBINED TYPE: Primary | ICD-10-CM

## 2022-02-04 DIAGNOSIS — K59.00 CONSTIPATION, UNSPECIFIED CONSTIPATION TYPE: ICD-10-CM

## 2022-02-04 DIAGNOSIS — Z97.5 IUD CONTRACEPTION: ICD-10-CM

## 2022-02-04 DIAGNOSIS — Z78.9 HEPATITIS B VACCINATION STATUS UNKNOWN: ICD-10-CM

## 2022-02-04 LAB — HBV SURFACE AB SERPL IA-ACNC: 5.92 M[IU]/ML

## 2022-02-04 PROCEDURE — 36415 COLL VENOUS BLD VENIPUNCTURE: CPT | Performed by: FAMILY MEDICINE

## 2022-02-04 PROCEDURE — 86706 HEP B SURFACE ANTIBODY: CPT | Performed by: FAMILY MEDICINE

## 2022-02-04 PROCEDURE — 99214 OFFICE O/P EST MOD 30 MIN: CPT | Performed by: FAMILY MEDICINE

## 2022-02-04 RX ORDER — DEXTROAMPHETAMINE SACCHARATE, AMPHETAMINE ASPARTATE, DEXTROAMPHETAMINE SULFATE AND AMPHETAMINE SULFATE 1.25; 1.25; 1.25; 1.25 MG/1; MG/1; MG/1; MG/1
TABLET ORAL
Qty: 90 TABLET | Refills: 0 | Status: SHIPPED | OUTPATIENT
Start: 2022-02-04 | End: 2022-03-30

## 2022-02-04 RX ORDER — DEXTROAMPHETAMINE SACCHARATE, AMPHETAMINE ASPARTATE MONOHYDRATE, DEXTROAMPHETAMINE SULFATE AND AMPHETAMINE SULFATE 5; 5; 5; 5 MG/1; MG/1; MG/1; MG/1
CAPSULE, EXTENDED RELEASE ORAL
Qty: 90 CAPSULE | Refills: 0 | Status: SHIPPED | OUTPATIENT
Start: 2022-02-04 | End: 2022-03-30

## 2022-02-04 ASSESSMENT — ANXIETY QUESTIONNAIRES
7. FEELING AFRAID AS IF SOMETHING AWFUL MIGHT HAPPEN: NOT AT ALL
7. FEELING AFRAID AS IF SOMETHING AWFUL MIGHT HAPPEN: NOT AT ALL
GAD7 TOTAL SCORE: 5
6. BECOMING EASILY ANNOYED OR IRRITABLE: SEVERAL DAYS
1. FEELING NERVOUS, ANXIOUS, OR ON EDGE: SEVERAL DAYS
GAD7 TOTAL SCORE: 5
2. NOT BEING ABLE TO STOP OR CONTROL WORRYING: NOT AT ALL
5. BEING SO RESTLESS THAT IT IS HARD TO SIT STILL: SEVERAL DAYS
4. TROUBLE RELAXING: SEVERAL DAYS
3. WORRYING TOO MUCH ABOUT DIFFERENT THINGS: SEVERAL DAYS
GAD7 TOTAL SCORE: 5

## 2022-02-04 ASSESSMENT — MIFFLIN-ST. JEOR: SCORE: 1277.74

## 2022-02-04 ASSESSMENT — PAIN SCALES - GENERAL: PAINLEVEL: NO PAIN (0)

## 2022-02-04 NOTE — TELEPHONE ENCOUNTER
Per provider after pt left clinic    Visit Disposition    Dispositions Check-out Note   0 Return in about 4 weeks (around 3/4/2022) for Annual Well Check. Patient has had Mirena for 5 years, needs replacement, please schedule     Barbara Granados CMA (AAMA)

## 2022-02-05 ASSESSMENT — ANXIETY QUESTIONNAIRES: GAD7 TOTAL SCORE: 5

## 2022-02-05 ASSESSMENT — PATIENT HEALTH QUESTIONNAIRE - PHQ9: SUM OF ALL RESPONSES TO PHQ QUESTIONS 1-9: 2

## 2022-02-10 NOTE — RESULT ENCOUNTER NOTE
Donald,  Your recent studies showed immunity to hepatitis B.  Please let me know if you have any questions or concerns and follow up as discussed in clinic.    Sincerely.  Dr. LAURA Luna MD, FAAFP  Family Medicine Physician  [facility]  [address] [place] [address]

## 2022-02-12 ENCOUNTER — HEALTH MAINTENANCE LETTER (OUTPATIENT)
Age: 27
End: 2022-02-12

## 2022-02-17 ENCOUNTER — OFFICE VISIT (OUTPATIENT)
Dept: OBGYN | Facility: OTHER | Age: 27
End: 2022-02-17
Payer: COMMERCIAL

## 2022-02-17 VITALS
WEIGHT: 133 LBS | SYSTOLIC BLOOD PRESSURE: 121 MMHG | DIASTOLIC BLOOD PRESSURE: 84 MMHG | BODY MASS INDEX: 25.31 KG/M2 | HEART RATE: 107 BPM

## 2022-02-17 DIAGNOSIS — Z11.3 ROUTINE SCREENING FOR STI (SEXUALLY TRANSMITTED INFECTION): ICD-10-CM

## 2022-02-17 DIAGNOSIS — Z30.432 ENCOUNTER FOR REMOVAL OF INTRAUTERINE CONTRACEPTIVE DEVICE: Primary | ICD-10-CM

## 2022-02-17 PROBLEM — Z97.5 PRESENCE OF INTRAUTERINE CONTRACEPTIVE DEVICE: Status: RESOLVED | Noted: 2017-01-12 | Resolved: 2022-02-17

## 2022-02-17 PROCEDURE — 87591 N.GONORRHOEAE DNA AMP PROB: CPT | Performed by: ADVANCED PRACTICE MIDWIFE

## 2022-02-17 PROCEDURE — 58301 REMOVE INTRAUTERINE DEVICE: CPT | Performed by: ADVANCED PRACTICE MIDWIFE

## 2022-02-17 PROCEDURE — 87491 CHLMYD TRACH DNA AMP PROBE: CPT | Performed by: ADVANCED PRACTICE MIDWIFE

## 2022-02-17 NOTE — PROGRESS NOTES
Donald Duarte is a 26 year old who presents to the clinic for removal of her IUD.   She thinks it is .  Was placed 2017 so would be effective until 2024.  States she is open to pregnancy but is planning on using the "[employer] thermometer ventura to prevent pregnancy.   Has not had bleeding with the Mirena and generally liked it.   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      DENISE (generalized anxiety disorder) 2013     Past Surgical History:   Procedure Laterality Date     DENTAL SURGERY      wisdom teeth     Social History     Socioeconomic History     Marital status: Single     Spouse name: Not on file     Number of children: Not on file     Years of education: Not on file     Highest education level: Not on file   Occupational History     Not on file   Tobacco Use     Smoking status: Never Smoker     Smokeless tobacco: Never Used   Vaping Use     Vaping Use: Never used   Substance and Sexual Activity     Alcohol use: No     Drug use: No     Sexual activity: Yes     Partners: Male     Birth control/protection: I.U.D.   Other Topics Concern     Parent/sibling w/ CABG, MI or angioplasty before 65F 55M? No   Social History Narrative     Not on file     Social Determinants of Health     Financial Resource Strain: Not on file   Food Insecurity: Not on file   Transportation Needs: Not on file   Physical Activity: Not on file   Stress: Not on file   Social Connections: Not on file   Intimate Partner Violence: Not on file   Housing Stability: Not on file     Family History   Problem Relation Age of Onset     Hypertension Mother      Arthritis Paternal Grandmother         lupus     Breast Cancer Paternal Aunt           ROS: 10 point ROS neg other than the symptoms noted above in the HPI.    EXAM:  /84 (BP Location: Right arm, Patient Position: Sitting, Cuff Size: Adult Regular)    Pulse 107   Wt 60.3 kg (133 lb)   LMP  (LMP Unknown)   BMI 25.31 kg/m    : PELVIC EXAM:  Vulva: No external lesions, normal hair distribution, no adenopathy, BUS WNL  Vagina: Moist, pink, no abnormal discharge, well rugated, no lesions  Cervix:, smooth, pink, no visible lesions, neg CMT. The IUD strings were identified at the cervical os  They were easily grasped with a ring forceps and with gentle steady traction the IUD was removed from the uterus intact and disposed of following the hazardous waste guidelines.             ASSESSMENT/PLAN:    (Z30.432) Encounter for removal of intrauterine contraceptive device  (primary encounter diagnosis)  Comment:   Plan:     (Z11.3) Routine screening for STI (sexually transmitted infection)  Comment:   Plan: Chlamydia trachomatis PCR, Neisseria         gonorrhoeae PCR          Reviewed failure rate of the Mirena at 1:1000/ year and the temperature ventura at approximately 20:100 /year  Reviewed return to fertility and recommended PNV and a generally healthy lifestyle    Labs were not reviewed in [facility]      Imaging was not reviewed in Epic.       30 minutes spent on the date of the encounter doing chart review, patient visit and documentation

## 2022-02-18 LAB
C TRACH DNA SPEC QL NAA+PROBE: NEGATIVE
N GONORRHOEA DNA SPEC QL NAA+PROBE: NEGATIVE

## 2022-03-29 DIAGNOSIS — F90.2 ATTENTION DEFICIT HYPERACTIVITY DISORDER (ADHD), COMBINED TYPE: ICD-10-CM

## 2022-03-29 NOTE — TELEPHONE ENCOUNTER
"Per fax from pharmacy  \"Please only send for 30 days, due to insurance requirement. Rx sent 2-4-22 was voided for remaining 60 for both prescriptions\"    Thanks  Piper Medrano RT (R)         "

## 2022-03-30 RX ORDER — DEXTROAMPHETAMINE SACCHARATE, AMPHETAMINE ASPARTATE, DEXTROAMPHETAMINE SULFATE AND AMPHETAMINE SULFATE 1.25; 1.25; 1.25; 1.25 MG/1; MG/1; MG/1; MG/1
TABLET ORAL
Qty: 30 TABLET | Refills: 0 | Status: SHIPPED | OUTPATIENT
Start: 2022-03-30 | End: 2022-05-05

## 2022-03-30 RX ORDER — DEXTROAMPHETAMINE SACCHARATE, AMPHETAMINE ASPARTATE MONOHYDRATE, DEXTROAMPHETAMINE SULFATE AND AMPHETAMINE SULFATE 5; 5; 5; 5 MG/1; MG/1; MG/1; MG/1
CAPSULE, EXTENDED RELEASE ORAL
Qty: 30 CAPSULE | Refills: 0 | Status: SHIPPED | OUTPATIENT
Start: 2022-03-30 | End: 2022-05-05

## 2022-05-04 DIAGNOSIS — F90.2 ATTENTION DEFICIT HYPERACTIVITY DISORDER (ADHD), COMBINED TYPE: ICD-10-CM

## 2022-05-05 RX ORDER — DEXTROAMPHETAMINE SACCHARATE, AMPHETAMINE ASPARTATE, DEXTROAMPHETAMINE SULFATE AND AMPHETAMINE SULFATE 1.25; 1.25; 1.25; 1.25 MG/1; MG/1; MG/1; MG/1
TABLET ORAL
Qty: 30 TABLET | Refills: 0 | Status: SHIPPED | OUTPATIENT
Start: 2022-05-05 | End: 2022-06-23

## 2022-05-05 RX ORDER — DEXTROAMPHETAMINE SACCHARATE, AMPHETAMINE ASPARTATE MONOHYDRATE, DEXTROAMPHETAMINE SULFATE AND AMPHETAMINE SULFATE 5; 5; 5; 5 MG/1; MG/1; MG/1; MG/1
CAPSULE, EXTENDED RELEASE ORAL
Qty: 30 CAPSULE | Refills: 0 | Status: SHIPPED | OUTPATIENT
Start: 2022-05-05 | End: 2022-06-23

## 2022-05-05 NOTE — TELEPHONE ENCOUNTER
Pending Prescriptions:                       Disp   Refills    amphetamine-dextroamphetamine (ADDERALL XR*30 cap*0        Sig: TAKE ONE CAPSULE BY MOUTH IN THE MORNING  with food    amphetamine-dextroamphetamine (ADDERALL) 5*30 tab*0        Sig: TAKE ONE TABLET BY MOUTH ONE TIME DAILY AT 1 PM    Routing refill request to provider for review/approval because:  Drug not on the [facility] refill protocol     Is followed by Dr. Luna.  Radha Ashford RN

## 2022-05-24 NOTE — PROGRESS NOTES
SUBJECTIVE:   CC: Donald Duarte is an 26 year old woman who presents for preventive health visit.       Patient has been advised of split billing requirements and indicates understanding: Yes     Healthy Habits:     Getting at least 3 servings of Calcium per day:  Yes    Bi-annual eye exam:  NO    Dental care twice a year:  NO    Sleep apnea or symptoms of sleep apnea:  None    Diet:  Vegetarian/vegan    Frequency of exercise:  2-3 days/week    Duration of exercise:  30-45 minutes    Taking medications regularly:  Yes    Medication side effects:  None    PHQ-2 Total Score: 0    Additional concerns today:  No          Today's PHQ-2 Score:   PHQ-2 ( 1999 [employer]) 5/31/2022   Q1: Little interest or pleasure in doing things 0   Q2: Feeling down, depressed or hopeless 0   PHQ-2 Score 0   PHQ-2 Total Score (12-17 Years)- Positive if 3 or more points; Administer PHQ-A if positive -   Q1: Little interest or pleasure in doing things Not at all   Q2: Feeling down, depressed or hopeless Not at all   PHQ-2 Score 0       Abuse: Current or Past (Physical, Sexual or Emotional) - No  Do you feel safe in your environment? Yes    Have you ever done Advance Care Planning? (For example, a Health Directive, POLST, or a discussion with a medical provider or your loved ones about your wishes): No, advance care planning information given to patient to review.  Patient plans to discuss their wishes with loved ones or provider.      Social History     Tobacco Use     Smoking status: Never Smoker     Smokeless tobacco: Never Used   Substance Use Topics     Alcohol use: No         Alcohol Use 5/31/2022   Prescreen: >3 drinks/day or >7 drinks/week? No   Prescreen: >3 drinks/day or >7 drinks/week? -     Reviewed orders with patient.  Reviewed health maintenance and updated orders accordingly - Yes  Lab work is in process  Labs reviewed in EPIC  BP Readings from Last 3 Encounters:   05/31/22 120/74   02/17/22 121/84   02/04/22 104/66    Wt  "Readings from Last 3 Encounters:   05/31/22 59.4 kg (131 lb)   02/17/22 60.3 kg (133 lb)   02/04/22 59.9 kg (132 lb)                    Breast Cancer Screening:  Any new diagnosis of family breast, ovarian, or bowel cancer? Yes       FHS-7:   Breast CA Risk Assessment (FHS-7) 5/31/2022   Did any of your first-degree relatives have breast or ovarian cancer? No   Did any of your relatives have bilateral breast cancer? Yes   Did any man in your family have breast cancer? No   Did any woman in your family have breast and ovarian cancer? Yes   Did any woman in your family have breast cancer before age 50 y? Yes   Do you have 2 or more relatives with breast and/or ovarian cancer? No   Do you have 2 or more relatives with breast and/or bowel cancer? No     Patient under 40 years of age: Routine Mammogram Screening not recommended.   Pertinent mammograms are reviewed under the imaging tab.    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   Genitourinary: Negative for pelvic pain, vaginal bleeding and vaginal discharge.        OBJECTIVE:   /74 (BP Location: Left arm, Patient Position: Chair, Cuff Size: Adult Regular)   Pulse 75   Temp 98.1  F (36.7  C) (Temporal)   Resp 16   Ht 1.556 m (5' 1.25\")   Wt 59.4 kg (131 lb)   LMP 05/23/2022 (Exact Date)   SpO2 99%   Breastfeeding No   BMI 24.55 kg/m    Physical Exam  GENERAL: healthy, alert and no distress  EYES: Eyes " grossly normal to inspection, PERRL and conjunctivae and sclerae normal  HENT: ear canals and TM's normal, nose and mouth without ulcers or lesions  NECK: no adenopathy, no asymmetry, masses, or scars and thyroid normal to palpation  RESP: lungs clear to auscultation - no rales, rhonchi or wheezes  BREAST: Deferred-no breast complaint  CV: regular rate and rhythm, normal S1 S2, no S3 or S4, no murmur, click or rub, no peripheral edema and peripheral pulses strong  ABDOMEN: soft, nontender, no hepatosplenomegaly, no masses and bowel sounds normal  MS: no gross musculoskeletal defects noted, no edema  SKIN: no suspicious lesions or rashes  NEURO: Normal strength and tone, mentation intact and speech normal  PSYCH: mentation appears normal, affect normal/bright    Diagnostic Test Results:  Labs reviewed in Epic    ASSESSMENT/PLAN:   (Z00.00) Routine general medical examination at a health care facility  (primary encounter diagnosis)  Comment: 26-year-old female here for annual well exam.  We discussed current cancer screening guidelines.  Fasting lab was scheduled.  Plan: Lipid panel reflex to direct LDL Fasting,         Glucose            (Z30.011) Encounter for initial prescription of contraceptive pills  Comment: Recently had IUD removed, she is interested in oral contraception, we discussed starting on her next period, follow-up if period is not captured in 3 cycles.  Plan: norgestim-eth estrad triphasic (ORTHO         TRI-CYCLEN LO) 0.18/0.215/0.25 MG-25 MCG tablet            (F90.2) Attention deficit hyperactivity disorder (ADHD), combined type  Comment: No complaints today  Plan: Continue current regimen    COUNSELING:  Reviewed preventive health counseling, as reflected in patient instructions       Regular exercise       Healthy diet/nutrition       Contraception       Family planning       Colorectal Cancer Screening    Estimated body mass index is 24.55 kg/m  as calculated from the following:    Height as of  "this encounter: 1.556 m (5' 1.25\").    Weight as of this encounter: 59.4 kg (131 lb).        She reports that she has never smoked. She has never used smokeless tobacco.      Counseling Resources:  ATP IV Guidelines  Pooled Cohorts Equation Calculator  Breast Cancer Risk Calculator  BRCA-Related Cancer Risk Assessment: FHS-7 Tool  FRAX Risk Assessment  ICSI Preventive Guidelines  Dietary Guidelines for Americans, 2010  USDA's MyPlate  ASA Prophylaxis  Lung CA Screening    Timothy Luna MD  [facility]  "

## 2022-05-31 ENCOUNTER — OFFICE VISIT (OUTPATIENT)
Dept: FAMILY MEDICINE | Facility: CLINIC | Age: 27
End: 2022-05-31
Payer: COMMERCIAL

## 2022-05-31 VITALS
DIASTOLIC BLOOD PRESSURE: 74 MMHG | RESPIRATION RATE: 16 BRPM | OXYGEN SATURATION: 99 % | WEIGHT: 131 LBS | BODY MASS INDEX: 24.73 KG/M2 | HEIGHT: 61 IN | TEMPERATURE: 98.1 F | HEART RATE: 75 BPM | SYSTOLIC BLOOD PRESSURE: 120 MMHG

## 2022-05-31 DIAGNOSIS — Z30.011 ENCOUNTER FOR INITIAL PRESCRIPTION OF CONTRACEPTIVE PILLS: ICD-10-CM

## 2022-05-31 DIAGNOSIS — F90.2 ATTENTION DEFICIT HYPERACTIVITY DISORDER (ADHD), COMBINED TYPE: ICD-10-CM

## 2022-05-31 DIAGNOSIS — Z00.00 ROUTINE GENERAL MEDICAL EXAMINATION AT A HEALTH CARE FACILITY: Primary | ICD-10-CM

## 2022-05-31 PROCEDURE — 99395 PREV VISIT EST AGE 18-39: CPT | Performed by: FAMILY MEDICINE

## 2022-05-31 RX ORDER — NORGESTIMATE AND ETHINYL ESTRADIOL 7DAYSX3 LO
1 KIT ORAL DAILY
Qty: 84 TABLET | Refills: 3 | Status: SHIPPED | OUTPATIENT
Start: 2022-05-31 | End: 2022-11-03

## 2022-05-31 ASSESSMENT — ENCOUNTER SYMPTOMS: BREAST MASS: 0

## 2022-05-31 ASSESSMENT — PAIN SCALES - GENERAL: PAINLEVEL: NO PAIN (0)

## 2022-06-09 ENCOUNTER — OFFICE VISIT (OUTPATIENT)
Dept: DERMATOLOGY | Facility: CLINIC | Age: 27
End: 2022-06-09
Payer: COMMERCIAL

## 2022-06-09 DIAGNOSIS — D22.9 NEVUS: Primary | ICD-10-CM

## 2022-06-09 PROCEDURE — 88305 TISSUE EXAM BY PATHOLOGIST: CPT | Performed by: DERMATOLOGY

## 2022-06-09 ASSESSMENT — PAIN SCALES - GENERAL: PAINLEVEL: NO PAIN (0)

## 2022-06-09 NOTE — LETTER
6/9/2022         RE: Donald Duarte  [address] [facility] [address]        Dear Colleague,    Thank you for referring your patient, Donald Duarte, to the [facility]. Please see a copy of my visit note below.    [facility] Dermatology Note  Encounter Date: Jun 9, 2022  Office Visit     Reviewed patients past medical history and pertinent chart review prior to patients visit today.     Dermatology Problem List:  Patient denies personal history of skin cancer or dysplastic nevi.      ____________________________________________    Assessment & Plan:     # Nevus, patient requests removal. Has discussed removal with Dr. Toth at visit 3/18/21. Bothersome cosmetically, not irritated per patient. Discussed fees at previous visit and patient would like to proceed with removal.   Shave removal today.    Procedure Note: Biopsy by shave technique  The risks and benefits of the procedure were described to the patient. These include but are not limited to bleeding, infection, scar, incomplete removal, and non-diagnostic biopsy. Verbal informed consent was obtained. The above site(s) was cleansed with an alcohol pad and injected with 1% lidocaine with epinephrine. Once anesthesia was obtained, a biopsy(ies) was performed with Gilette blade. The tissue(s) was placed in a labeled container(s) with formalin and sent to pathology. Hemostasis was achieved with aluminum chloride. Vaseline and a bandage were applied to the wound(s). The patient tolerated the procedure well and was given post biopsy care instructions.     Catherine Haro, APRN CNP on 6/9/2022 at 2:06 PM   _______________________________________    CC: Derm Problem (Mole that she would like removed)    HPI:  Ms. Donald Duarte is a(n) 26 year old female who presents today for follow-up  for mole on right upper eyelid.  Patient cosmetically does not like this mole.  She would like it removed.  She  says it is not irritated or painful.  She has previously discussed removal and cost with Dr. Toth and is back for removal today.    Patient is otherwise feeling well, without additional skin concerns.      Physical Exam:  Vitals: LMP 05/23/2022 (Exact Date)   SKIN: Focused examination of mole on upper eyelid was performed.  - right upper eyelid, 4 mm skin colored papule with light brown pigmentation at center    - No other lesions of concern on areas examined.     Medications:  Current Outpatient Medications   Medication     amphetamine-dextroamphetamine (ADDERALL XR) 20 MG 24 hr capsule     amphetamine-dextroamphetamine (ADDERALL) 5 MG tablet     loratadine (CLARITIN) 10 MG tablet     norgestim-eth estrad triphasic (ORTHO TRI-CYCLEN LO) 0.18/0.215/0.25 MG-25 MCG tablet     Phenylephrine-Polyvinyl Alc (REFRESH REDNESS RELIEF) 0.12-1.4 % SOLN     VITAMIN E PO     No current facility-administered medications for this visit.      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       Again, thank you for allowing me to participate in the care of your patient.        Sincerely,        JAMES Mike CNP

## 2022-06-09 NOTE — PATIENT INSTRUCTIONS
Wound Care Instructions     FOR SUPERFICIAL WOUNDS     [facility], [facility] or    [facility] [phone]          AFTER 24 HOURS YOU SHOULD REMOVE THE BANDAGE AND BEGIN DAILY DRESSING CHANGES AS FOLLOWS:     1) Remove Dressing.     2) Clean and dry the area with tap water using a Q-tip or sterile gauze pad.     3) Apply Vaseline, Aquaphor, Polysporin ointment or Bacitracin ointment over entire wound.  Do NOT use Neosporin ointment.     4) Cover the wound with a band-aid, or a sterile non-stick gauze pad and micropore paper tape      REPEAT THESE INSTRUCTIONS AT LEAST ONCE A DAY UNTIL THE WOUND HAS COMPLETELY HEALED.    It is an old wives tale that a wound heals better when it is exposed to air and allowed to dry out. The wound will heal faster with a better cosmetic result if it is kept moist with ointment and covered with a bandage.    **Do not let the wound dry out.**      Supplies Needed:      *Cotton tipped applicators (Q-tips)    *Polysporin Ointment or Bacitracin Ointment (NOT NEOSPORIN)    *Band-aids or non-stick gauze pads and micropore paper tape.      PATIENT INFORMATION:    During the healing process you will notice a number of changes. All wounds develop a small halo of redness surrounding the wound.  This means healing is occurring. Severe itching with extensive redness usually indicates sensitivity to the ointment or bandage tape used to dress the wound.  You should call our office if this develops.      Swelling  and/or discoloration around your surgical site is common, particularly when performed around the eye.    All wounds normally drain.  The larger the wound the more drainage there will be.  After 7-10 days, you will notice the wound beginning to shrink and new skin will begin to grow.  The wound is healed when you can see skin has formed over the entire area.  A healed wound has a healthy, shiny look to the surface and is red to dark pink in color to normalize.   Wounds may take approximately 4-6 weeks to heal.  Larger wounds may take 6-8 weeks.  After the wound is healed you may discontinue dressing changes.    You may experience a sensation of tightness as your wound heals. This is normal and will gradually subside.    Your healed wound may be sensitive to temperature changes. This sensitivity improves with time, but if you re having a lot of discomfort, try to avoid temperature extremes.    Patients frequently experience itching after their wound appears to have healed because of the continue healing under the skin.  Plain Vaseline will help relieve the itching.        POSSIBLE COMPLICATIONS    BLEEDING:    Leave the bandage in place.  Use tightly rolled up gauze or a cloth to apply direct pressure over the bandage for 30  minutes.  Reapply pressure for an additional 30 minutes if necessary  Use additional gauze and tape to maintain pressure once the bleeding has stopped.

## 2022-06-09 NOTE — PROGRESS NOTES
[facility] Dermatology Note  Encounter Date: Jun 9, 2022  Office Visit     Reviewed patients past medical history and pertinent chart review prior to patients visit today.     Dermatology Problem List:  Patient denies personal history of skin cancer or dysplastic nevi.      ____________________________________________    Assessment & Plan:     # Nevus, patient requests removal. Has discussed removal with Dr. Toth at visit 3/18/21. Bothersome cosmetically, not irritated per patient. Discussed fees at previous visit and patient would like to proceed with removal.   Shave removal today.    Procedure Note: Biopsy by shave technique  The risks and benefits of the procedure were described to the patient. These include but are not limited to bleeding, infection, scar, incomplete removal, and non-diagnostic biopsy. Verbal informed consent was obtained. The above site(s) was cleansed with an alcohol pad and injected with 1% lidocaine with epinephrine. Once anesthesia was obtained, a biopsy(ies) was performed with Gilette blade. The tissue(s) was placed in a labeled container(s) with formalin and sent to pathology. Hemostasis was achieved with aluminum chloride. Vaseline and a bandage were applied to the wound(s). The patient tolerated the procedure well and was given post biopsy care instructions.     Catherine Haro, APRN CNP on 6/9/2022 at 2:06 PM   _______________________________________    CC: Derm Problem (Mole that she would like removed)    HPI:  Ms. Donald Duarte is a(n) 26 year old female who presents today for follow-up  for mole on right upper eyelid.  Patient cosmetically does not like this mole.  She would like it removed.  She says it is not irritated or painful.  She has previously discussed removal and cost with Dr. Toth and is back for removal today.    Patient is otherwise feeling well, without additional skin concerns.      Physical Exam:  Vitals: LMP 05/23/2022  (Exact Date)   SKIN: Focused examination of mole on upper eyelid was performed.  - right upper eyelid, 4 mm skin colored papule with light brown pigmentation at center    - No other lesions of concern on areas examined.     Medications:  Current Outpatient Medications   Medication     amphetamine-dextroamphetamine (ADDERALL XR) 20 MG 24 hr capsule     amphetamine-dextroamphetamine (ADDERALL) 5 MG tablet     loratadine (CLARITIN) 10 MG tablet     norgestim-eth estrad triphasic (ORTHO TRI-CYCLEN LO) 0.18/0.215/0.25 MG-25 MCG tablet     Phenylephrine-Polyvinyl Alc (REFRESH REDNESS RELIEF) 0.12-1.4 % SOLN     VITAMIN E PO     No current facility-administered medications for this visit.      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

## 2022-06-13 ENCOUNTER — LAB (OUTPATIENT)
Dept: LAB | Facility: CLINIC | Age: 27
End: 2022-06-13
Payer: COMMERCIAL

## 2022-06-13 DIAGNOSIS — Z00.00 ROUTINE GENERAL MEDICAL EXAMINATION AT A HEALTH CARE FACILITY: ICD-10-CM

## 2022-06-13 LAB
CHOLEST SERPL-MCNC: 118 MG/DL
FASTING STATUS PATIENT QL REPORTED: YES
FASTING STATUS PATIENT QL REPORTED: YES
GLUCOSE BLD-MCNC: 91 MG/DL (ref 70–99)
HDLC SERPL-MCNC: 45 MG/DL
LDLC SERPL CALC-MCNC: 66 MG/DL
NONHDLC SERPL-MCNC: 73 MG/DL
PATH REPORT.COMMENTS IMP SPEC: NORMAL
PATH REPORT.COMMENTS IMP SPEC: NORMAL
PATH REPORT.FINAL DX SPEC: NORMAL
PATH REPORT.GROSS SPEC: NORMAL
PATH REPORT.MICROSCOPIC SPEC OTHER STN: NORMAL
PATH REPORT.RELEVANT HX SPEC: NORMAL
TRIGL SERPL-MCNC: 37 MG/DL

## 2022-06-13 PROCEDURE — 80061 LIPID PANEL: CPT

## 2022-06-13 PROCEDURE — 82947 ASSAY GLUCOSE BLOOD QUANT: CPT

## 2022-06-13 PROCEDURE — 36415 COLL VENOUS BLD VENIPUNCTURE: CPT

## 2022-06-14 NOTE — RESULT ENCOUNTER NOTE
oDnald,  Your recent studies were normal except for a mild decrease in your good cholesterol.  Daily cardiovascular exercise can improve that.  Please let me know if you have any questions or concerns and follow up as discussed in clinic.    Sincerely.  Dr. LAURA Luna MD, [facility]  Family Medicine Physician  [facility]  [address] [facility], [place] [address]

## 2022-06-20 DIAGNOSIS — F90.2 ATTENTION DEFICIT HYPERACTIVITY DISORDER (ADHD), COMBINED TYPE: ICD-10-CM

## 2022-06-22 NOTE — TELEPHONE ENCOUNTER
Pending Prescriptions:                       Disp   Refills    amphetamine-dextroamphetamine (ADDERALL) 5*30 tab*0        Sig: TAKE ONE TABLET BY MOUTH ONE TIME DAILY AT 1 PM    amphetamine-dextroamphetamine (ADDERALL XR*30 cap*0        Sig: TAKE ONE CAPSULE BY MOUTH IN THE MORNING  with food    Routing refill request to provider for review/approval because:  Drug not on the [facility] refill protocol

## 2022-06-23 RX ORDER — DEXTROAMPHETAMINE SACCHARATE, AMPHETAMINE ASPARTATE MONOHYDRATE, DEXTROAMPHETAMINE SULFATE AND AMPHETAMINE SULFATE 5; 5; 5; 5 MG/1; MG/1; MG/1; MG/1
CAPSULE, EXTENDED RELEASE ORAL
Qty: 30 CAPSULE | Refills: 0 | Status: SHIPPED | OUTPATIENT
Start: 2022-06-23 | End: 2022-07-25

## 2022-06-23 RX ORDER — DEXTROAMPHETAMINE SACCHARATE, AMPHETAMINE ASPARTATE, DEXTROAMPHETAMINE SULFATE AND AMPHETAMINE SULFATE 1.25; 1.25; 1.25; 1.25 MG/1; MG/1; MG/1; MG/1
TABLET ORAL
Qty: 30 TABLET | Refills: 0 | Status: SHIPPED | OUTPATIENT
Start: 2022-06-23 | End: 2022-07-25

## 2022-07-25 DIAGNOSIS — F90.2 ATTENTION DEFICIT HYPERACTIVITY DISORDER (ADHD), COMBINED TYPE: ICD-10-CM

## 2022-07-25 RX ORDER — DEXTROAMPHETAMINE SACCHARATE, AMPHETAMINE ASPARTATE, DEXTROAMPHETAMINE SULFATE AND AMPHETAMINE SULFATE 1.25; 1.25; 1.25; 1.25 MG/1; MG/1; MG/1; MG/1
TABLET ORAL
Qty: 30 TABLET | Refills: 0 | Status: SHIPPED | OUTPATIENT
Start: 2022-07-25 | End: 2022-09-20

## 2022-07-25 RX ORDER — DEXTROAMPHETAMINE SACCHARATE, AMPHETAMINE ASPARTATE MONOHYDRATE, DEXTROAMPHETAMINE SULFATE AND AMPHETAMINE SULFATE 5; 5; 5; 5 MG/1; MG/1; MG/1; MG/1
CAPSULE, EXTENDED RELEASE ORAL
Qty: 30 CAPSULE | Refills: 0 | Status: SHIPPED | OUTPATIENT
Start: 2022-07-25 | End: 2022-09-20

## 2022-07-25 NOTE — TELEPHONE ENCOUNTER
Pending Prescriptions:                       Disp   Refills    ADDERALL XR 20 MG 24 hr capsule [Pharmacy *30 cap*0        Sig: TAKE ONE CAPSULE BY MOUTH IN THE MORNING  with food    amphetamine-dextroamphetamine (ADDERALL) 5*30 tab*0        Sig: TAKE ONE TABLET BY MOUTH ONE TIME DAILY AT 1 PM        Routing refill request to provider for review/approval because:  Drug not on the [facility] refill protocol

## 2022-09-16 DIAGNOSIS — F90.2 ATTENTION DEFICIT HYPERACTIVITY DISORDER (ADHD), COMBINED TYPE: ICD-10-CM

## 2022-09-20 RX ORDER — DEXTROAMPHETAMINE SACCHARATE, AMPHETAMINE ASPARTATE, DEXTROAMPHETAMINE SULFATE AND AMPHETAMINE SULFATE 1.25; 1.25; 1.25; 1.25 MG/1; MG/1; MG/1; MG/1
TABLET ORAL
Qty: 30 TABLET | Refills: 0 | Status: SHIPPED | OUTPATIENT
Start: 2022-09-20 | End: 2022-10-26

## 2022-09-20 RX ORDER — DEXTROAMPHETAMINE SACCHARATE, AMPHETAMINE ASPARTATE MONOHYDRATE, DEXTROAMPHETAMINE SULFATE AND AMPHETAMINE SULFATE 5; 5; 5; 5 MG/1; MG/1; MG/1; MG/1
CAPSULE, EXTENDED RELEASE ORAL
Qty: 30 CAPSULE | Refills: 0 | Status: SHIPPED | OUTPATIENT
Start: 2022-09-20 | End: 2022-10-26

## 2022-09-26 ENCOUNTER — OFFICE VISIT (OUTPATIENT)
Dept: FAMILY MEDICINE | Facility: OTHER | Age: 27
End: 2022-09-26
Payer: COMMERCIAL

## 2022-09-26 VITALS
TEMPERATURE: 97.5 F | HEART RATE: 78 BPM | DIASTOLIC BLOOD PRESSURE: 70 MMHG | WEIGHT: 126 LBS | SYSTOLIC BLOOD PRESSURE: 100 MMHG | OXYGEN SATURATION: 99 % | BODY MASS INDEX: 23.61 KG/M2

## 2022-09-26 DIAGNOSIS — R63.5 WEIGHT GAIN: ICD-10-CM

## 2022-09-26 DIAGNOSIS — J30.2 SEASONAL ALLERGIC RHINITIS, UNSPECIFIED TRIGGER: Primary | ICD-10-CM

## 2022-09-26 DIAGNOSIS — R05.9 COUGH: ICD-10-CM

## 2022-09-26 LAB — TSH SERPL DL<=0.005 MIU/L-ACNC: 1.16 MU/L (ref 0.4–4)

## 2022-09-26 PROCEDURE — 99213 OFFICE O/P EST LOW 20 MIN: CPT | Performed by: PHYSICIAN ASSISTANT

## 2022-09-26 PROCEDURE — 84443 ASSAY THYROID STIM HORMONE: CPT | Performed by: PHYSICIAN ASSISTANT

## 2022-09-26 PROCEDURE — 36415 COLL VENOUS BLD VENIPUNCTURE: CPT | Performed by: PHYSICIAN ASSISTANT

## 2022-09-26 RX ORDER — FLUTICASONE PROPIONATE 50 MCG
1 SPRAY, SUSPENSION (ML) NASAL DAILY
Qty: 15.8 ML | Refills: 1 | Status: SHIPPED | OUTPATIENT
Start: 2022-09-26 | End: 2023-12-15

## 2022-09-26 RX ORDER — FAMOTIDINE 20 MG/1
20 TABLET, FILM COATED ORAL 2 TIMES DAILY PRN
Qty: 60 TABLET | Refills: 1 | Status: SHIPPED | OUTPATIENT
Start: 2022-09-26 | End: 2024-06-12

## 2022-09-26 RX ORDER — LORATADINE 10 MG/1
1 TABLET ORAL DAILY
Qty: 90 TABLET | Refills: 0 | Status: SHIPPED | OUTPATIENT
Start: 2022-09-26 | End: 2024-06-12

## 2022-09-26 ASSESSMENT — PAIN SCALES - GENERAL: PAINLEVEL: NO PAIN (0)

## 2022-09-26 NOTE — PROGRESS NOTES
Assessment & Plan     Seasonal allergic rhinitis, unspecified trigger  Patient informs me that she has a history of seasonal allergies which are worse in the late summer/fall. She has been using claritin for management of her symptoms, but does not feel that it is providing adequate benefit at this time. She has been experiencing rhinorrhea and congestion. Feels that the left nasal passage is more congested that the right. On exam this was consistent with the reported history. Reviewed home/environmental controls with the patient to help her with reducing potential exposure to triggers. I will have her begin a triple therapy for her allergies. First, she will continue the claritin daily without change. I will have her begin use of flonase to help with her congestion and rhinorrhea. Reviewed technique for use of flonase as well as timeframe for benefit. Patient will also have famotidine as needed for added benefit towards histamine reduction. If this is not effective she will reach out to update me.   - fluticasone (FLONASE) 50 MCG/ACT nasal spray; Spray 1 spray into both nostrils daily  - famotidine (PEPCID) 20 MG tablet; Take 1 tablet (20 mg) by mouth 2 times daily as needed (allergies)    Cough  Discussed that famotidine, used with claritin, will provide added benefit to reduce cough and improve overall symptoms. This medication can be used as needed. She was receptive to this. Patient can consider nasal rinse if symptoms not improving.   - loratadine (CLARITIN) 10 MG tablet; Take 1 tablet (10 mg) by mouth daily  - famotidine (PEPCID) 20 MG tablet; Take 1 tablet (20 mg) by mouth 2 times daily as needed (allergies)    Weight gain  She informs me that she has been trying to lose weight over this summer by reducing her calories, limiting consumption of meats (mostly vegetarian) and exercising. Despite these efforts she does not feel that she is able to achieve the weight loss she is working towards. In review of  her past weights, I see that she is down 5lbs over the past four months. She would like to rule out thyroid disorder. I think that this is reasonable. I will update her with the results. Pending normal results can consider nutritionist consult.   - TSH with free T4 reflex; Future  - TSH with free T4 reflex      Return in about 6 months (around 3/26/2023) for Return for scheduled annual checkup with PCP.    RAJESH Dimas [facility] [place]    Subjective   McKinsey is a 27 year old, presenting for the following health issues:  Allergies and Thyroid Problem      History of Present Illness       Reason for visit:  Allergies and thyroid  Symptom onset:  3-4 weeks ago  Symptom intensity:  Moderate  Symptom progression:  Staying the same  Had these symptoms before:  No    She eats 2-3 servings of fruits and vegetables daily.She consumes 0 sweetened beverage(s) daily.She exercises with enough effort to increase her heart rate 30 to 60 minutes per day.  She exercises with enough effort to increase her heart rate 3 or less days per week.   She is taking medications regularly.    Review of Systems   Constitutional, HEENT, cardiovascular, pulmonary, gi and gu systems are negative, except as otherwise noted.      Objective    /70 (BP Location: Left arm, Patient Position: Sitting, Cuff Size: Adult Regular)   Pulse 78   Temp 97.5  F (36.4  C) (Temporal)   Wt 57.2 kg (126 lb)   LMP 09/01/2022   SpO2 99%   BMI 23.61 kg/m    Body mass index is 23.61 kg/m .  Physical Exam   GENERAL: healthy, alert and no distress  HENT: normal cephalic/atraumatic, ear canals and TM's normal, nose and mouth without ulcers or lesions, left nasal mucosa edematous , rhinorrhea clear, oropharynx clear and oral mucous membranes moist  NECK: no adenopathy, no asymmetry, masses, or scars and thyroid normal to palpation  RESP: lungs clear to auscultation - no rales, rhonchi or wheezes  CV: regular rate and rhythm, normal  S1 S2, no S3 or S4, no murmur, click or rub, no peripheral edema and peripheral pulses strong  MS: no gross musculoskeletal defects noted, no edema  PSYCH: mentation appears normal, affect normal/bright

## 2022-10-09 ENCOUNTER — HEALTH MAINTENANCE LETTER (OUTPATIENT)
Age: 27
End: 2022-10-09

## 2022-10-25 DIAGNOSIS — F90.2 ATTENTION DEFICIT HYPERACTIVITY DISORDER (ADHD), COMBINED TYPE: ICD-10-CM

## 2022-10-26 RX ORDER — DEXTROAMPHETAMINE SACCHARATE, AMPHETAMINE ASPARTATE MONOHYDRATE, DEXTROAMPHETAMINE SULFATE AND AMPHETAMINE SULFATE 5; 5; 5; 5 MG/1; MG/1; MG/1; MG/1
CAPSULE, EXTENDED RELEASE ORAL
Qty: 30 CAPSULE | Refills: 0 | Status: SHIPPED | OUTPATIENT
Start: 2022-10-26 | End: 2022-11-03

## 2022-10-26 RX ORDER — DEXTROAMPHETAMINE SACCHARATE, AMPHETAMINE ASPARTATE, DEXTROAMPHETAMINE SULFATE AND AMPHETAMINE SULFATE 1.25; 1.25; 1.25; 1.25 MG/1; MG/1; MG/1; MG/1
TABLET ORAL
Qty: 30 TABLET | Refills: 0 | Status: SHIPPED | OUTPATIENT
Start: 2022-10-26 | End: 2022-11-03

## 2022-11-03 ENCOUNTER — OFFICE VISIT (OUTPATIENT)
Dept: FAMILY MEDICINE | Facility: CLINIC | Age: 27
End: 2022-11-03
Payer: COMMERCIAL

## 2022-11-03 VITALS
DIASTOLIC BLOOD PRESSURE: 78 MMHG | SYSTOLIC BLOOD PRESSURE: 120 MMHG | WEIGHT: 125 LBS | TEMPERATURE: 98.7 F | OXYGEN SATURATION: 98 % | RESPIRATION RATE: 16 BRPM | HEIGHT: 61 IN | HEART RATE: 87 BPM | BODY MASS INDEX: 23.6 KG/M2

## 2022-11-03 DIAGNOSIS — F41.1 GAD (GENERALIZED ANXIETY DISORDER): ICD-10-CM

## 2022-11-03 DIAGNOSIS — F90.2 ATTENTION DEFICIT HYPERACTIVITY DISORDER (ADHD), COMBINED TYPE: Primary | ICD-10-CM

## 2022-11-03 DIAGNOSIS — Z30.011 OCP (ORAL CONTRACEPTIVE PILLS) INITIATION: ICD-10-CM

## 2022-11-03 PROCEDURE — 90471 IMMUNIZATION ADMIN: CPT | Performed by: FAMILY MEDICINE

## 2022-11-03 PROCEDURE — 99214 OFFICE O/P EST MOD 30 MIN: CPT | Mod: 25 | Performed by: FAMILY MEDICINE

## 2022-11-03 PROCEDURE — 90686 IIV4 VACC NO PRSV 0.5 ML IM: CPT | Performed by: FAMILY MEDICINE

## 2022-11-03 RX ORDER — DEXTROAMPHETAMINE SACCHARATE, AMPHETAMINE ASPARTATE, DEXTROAMPHETAMINE SULFATE AND AMPHETAMINE SULFATE 2.5; 2.5; 2.5; 2.5 MG/1; MG/1; MG/1; MG/1
10 TABLET ORAL EVERY MORNING
Qty: 90 TABLET | Refills: 0 | Status: SHIPPED | OUTPATIENT
Start: 2022-11-03 | End: 2022-12-22

## 2022-11-03 RX ORDER — DROSPIRENONE AND ETHINYL ESTRADIOL 0.02-3(28)
1 KIT ORAL DAILY
Qty: 84 TABLET | Refills: 3 | Status: SHIPPED | OUTPATIENT
Start: 2022-11-03 | End: 2022-11-28

## 2022-11-03 RX ORDER — DEXTROAMPHETAMINE SACCHARATE, AMPHETAMINE ASPARTATE, DEXTROAMPHETAMINE SULFATE AND AMPHETAMINE SULFATE 1.25; 1.25; 1.25; 1.25 MG/1; MG/1; MG/1; MG/1
5 TABLET ORAL DAILY
Qty: 90 TABLET | Refills: 0 | Status: SHIPPED | OUTPATIENT
Start: 2022-11-03 | End: 2023-03-24

## 2022-11-03 ASSESSMENT — PAIN SCALES - GENERAL: PAINLEVEL: NO PAIN (0)

## 2022-11-14 ENCOUNTER — NURSE TRIAGE (OUTPATIENT)
Dept: FAMILY MEDICINE | Facility: CLINIC | Age: 27
End: 2022-11-14

## 2022-11-14 NOTE — TELEPHONE ENCOUNTER
Big toe right foot. There is a bubble forming on toe. There is redness and swelling. Warmth. Pain is at 7/10 when walking, and 10/10 when touched. Mild drainage. Too painful to try and drain.     Has had this in past on another toe and was given antibiotics.     Believes she may have cut toenail too short and now infected. No fevers. No red streaking.     GO TO OFFICE NOW:  * You need to be examined. Come into the office right now.   * IF NO AVAILABLE APPOINTMENTS:  You need to be seen in an Urgent Care Center. Go to the one at nearest location. Leave now. A nearby Urgent Care Center is often a good source of care. Another choice is to go to the Emergency Department.    Advised patient to be seen in urgent care to evaluate toe possibly being infected. No availability in clinic today. Patient will go into urgent care to be evaluated.     Noelle Britt, DOMINGON, RN  [facility]  November 14, 2022        Additional Information    Negative: Followed an injury    Negative: Wound looks infected    Negative: Caused by an animal bite    Negative: Caused by frostbite    Negative: Athlete's Foot suspected (i.e., itchy red rash in web space between toes)    Negative: Foot pain is main symptom    Negative: Foot is cool or blue in comparison to other foot    Negative: Purple or black skin on toe  (Exception: Simple recalled injury with bruise.)    Negative: Patient sounds very sick or weak to the triager    Protocols used: TOE PAIN-A-OH

## 2022-11-14 NOTE — TELEPHONE ENCOUNTER
Reason for Disposition    SEVERE pain (e.g., excruciating, unable to do any normal activities) and not improved after 2 hours of pain medicine    Protocols used: TOE PAIN-A-OH

## 2022-11-18 ENCOUNTER — OFFICE VISIT (OUTPATIENT)
Dept: DERMATOLOGY | Facility: CLINIC | Age: 27
End: 2022-11-18
Payer: COMMERCIAL

## 2022-11-18 DIAGNOSIS — L70.0 ACNE VULGARIS: Primary | ICD-10-CM

## 2022-11-18 PROCEDURE — 99214 OFFICE O/P EST MOD 30 MIN: CPT | Performed by: NURSE PRACTITIONER

## 2022-11-18 RX ORDER — TRETINOIN 0.25 MG/G
CREAM TOPICAL
Qty: 45 G | Refills: 11 | Status: SHIPPED | OUTPATIENT
Start: 2022-11-18 | End: 2023-11-29

## 2022-11-18 RX ORDER — CLINDAMYCIN PHOSPHATE 10 UG/ML
LOTION TOPICAL EVERY MORNING
Qty: 60 ML | Refills: 11 | Status: SHIPPED | OUTPATIENT
Start: 2022-11-18 | End: 2023-11-29

## 2022-11-18 NOTE — LETTER
11/18/2022         RE: Donald Duarte  [address] [facility] [address]        Dear Colleague,    Thank you for referring your patient, Donald Duarte, to the [facility]. Please see a copy of my visit note below.    [facility] Dermatology Note  Encounter Date: Nov 18, 2022  Office Visit     Reviewed patients past medical history and pertinent chart review prior to patients visit today.     Dermatology Problem List:  Acne    ____________________________________________    Assessment & Plan:     # Acne vulgaris    -Start clindamycin lotion once daily in the am.  -Start tretinoin 0.025% cream. Apply once every other day and increase to nightly as tolerate.  Waiting a few minutes after washing affected areas will decrease irritation. Method of application, side effects and expected results were discussed. The patient will apply pea size amount to the entire face, avoid areas around the eyes, corners of nose and mouth. Discussed side effects including photosensitivity and irritation.    -Recommend delayed start of benzoyl peroxide 5-10% wash to all areas of acne once skin is tolerating above medications well. Patient counseled medication can bleach towels and clothing.    Follow-up: 3 months for follow up if not well controlled. 1 year if well controlled on topical therapy only, sooner PRN new concerns    Catherine Haro, JAMES CNP on 11/18/2022 at 10:50 AM   _______________________________________    CC: Derm Problem (Skin texture )    HPI:  Ms. Donald Duarte is a(n) 27 year old female who presents today for follow-up  for acne. Patient has acne on face for many years. They have tried over the counter acne washes without good control of acne. She tried oral birth control but it seemed to flare the acne.       Patient is otherwise feeling well, without additional skin concerns.      Physical Exam:  Vitals: There were no vitals taken for this visit.  SKIN:  Acne exam, which includes the face, neck, upper central chest, and upper central back was performed.  - open and closed comedones scattered on the face  -scattered inflammatory papules and pustules on cheeks and chin  -scattered postinflammatory hyperpigmentation and purpura on the cheeks      - No other lesions of concern on areas examined.     Medications:  Current Outpatient Medications   Medication     clindamycin (CLEOCIN T) 1 % external lotion     tretinoin (RETIN-A) 0.025 % external cream     amphetamine-dextroamphetamine (ADDERALL) 10 MG tablet     amphetamine-dextroamphetamine (ADDERALL) 5 MG tablet     drospirenone-ethinyl estradiol (FREDDIE) 3-0.02 MG tablet     famotidine (PEPCID) 20 MG tablet     fluticasone (FLONASE) 50 MCG/ACT nasal spray     loratadine (CLARITIN) 10 MG tablet     Phenylephrine-Polyvinyl Alc (REFRESH REDNESS RELIEF) 0.12-1.4 % SOLN     VITAMIN E PO     No current facility-administered medications for this visit.      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       Again, thank you for allowing me to participate in the care of your patient.        Sincerely,        Catherine Haro, JAMES CNP

## 2022-11-18 NOTE — PROGRESS NOTES
[facility] Dermatology Note  Encounter Date: Nov 18, 2022  Office Visit     Reviewed patients past medical history and pertinent chart review prior to patients visit today.     Dermatology Problem List:  Acne    ____________________________________________    Assessment & Plan:     # Acne vulgaris    -Start clindamycin lotion once daily in the am.  -Start tretinoin 0.025% cream. Apply once every other day and increase to nightly as tolerate.  Waiting a few minutes after washing affected areas will decrease irritation. Method of application, side effects and expected results were discussed. The patient will apply pea size amount to the entire face, avoid areas around the eyes, corners of nose and mouth. Discussed side effects including photosensitivity and irritation.    -Recommend delayed start of benzoyl peroxide 5-10% wash to all areas of acne once skin is tolerating above medications well. Patient counseled medication can bleach towels and clothing.    Follow-up: 3 months for follow up if not well controlled. 1 year if well controlled on topical therapy only, sooner PRN new concerns    Catherine Haro, JAMES CNP on 11/18/2022 at 10:50 AM   _______________________________________    CC: Derm Problem (Skin texture )    HPI:  Ms. Donald Duarte is a(n) 27 year old female who presents today for follow-up  for acne. Patient has acne on face for many years. They have tried over the counter acne washes without good control of acne. She tried oral birth control but it seemed to flare the acne.       Patient is otherwise feeling well, without additional skin concerns.      Physical Exam:  Vitals: There were no vitals taken for this visit.  SKIN: Acne exam, which includes the face, neck, upper central chest, and upper central back was performed.  - open and closed comedones scattered on the face  -scattered inflammatory papules and pustules on cheeks and chin  -scattered postinflammatory  hyperpigmentation and purpura on the cheeks      - No other lesions of concern on areas examined.     Medications:  Current Outpatient Medications   Medication     clindamycin (CLEOCIN T) 1 % external lotion     tretinoin (RETIN-A) 0.025 % external cream     amphetamine-dextroamphetamine (ADDERALL) 10 MG tablet     amphetamine-dextroamphetamine (ADDERALL) 5 MG tablet     drospirenone-ethinyl estradiol (FREDDIE) 3-0.02 MG tablet     famotidine (PEPCID) 20 MG tablet     fluticasone (FLONASE) 50 MCG/ACT nasal spray     loratadine (CLARITIN) 10 MG tablet     Phenylephrine-Polyvinyl Alc (REFRESH REDNESS RELIEF) 0.12-1.4 % SOLN     VITAMIN E PO     No current facility-administered medications for this visit.      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

## 2022-11-18 NOTE — PATIENT INSTRUCTIONS
"Acne vulgaris    Morning regimen:    *Wash with either a gentle cleanser such as Cetaphil or Cera Ve unmedicated gentle cleanser. If at any point you are not experiencing dryness or peeling and want more improvement, try adding in a Benzoyl peroxide wash 5%-10%. All of these washes can be purchased over the counter at [employer], [employer], [employer] etc. If you have acne on your chest/back a benzoyl peroxide 10% wash can give you some added benefit if you use it in the shower to the affected areas, and let it sit on the skin for 3-5 minutes before rinsing it off. Benzoyl peroxide can cause dryness so if you're experiencing too much dryness stop the benzoyl peroxide wash and use just a gentle cleanser until dryness subsides. Benzoyl peroxide can also bleach fabrics so you may want to use white towels to avoid bleaching your towels.     *After washing, allow skin to fully dry.    *Then apply clindamycin 1% lotion to the entire face, as well as chest and back if affected.     *Apply a gentle moisturizer with SPF 30+ that says either \"noncomedogenic\" or \"oil free\" meaning it won't clog pores.     Evening regimen:    *Wash with either a gentle cleanser such as Cetaphil gentle cleanser or Benzoyl peroxide wash 5%. (See morning instructions for choosing a wash)    *Allow skin to fully dry again before applying medications. Applying retinods to moist skin will cause added dryness.     *Apply prescription retinoid (tretinoin, adapalene or Differin. Whichever you were prescribed) A pea sized amount will be enough for the entire face. More is not better, it will just add to the dryness. Apply to chest and back areas as well if affected. This is not a spot treatment so make sure you're covering the entire area that is affected by acne. When you're first starting a retinoid you WILL be dry and may have some flaking of the skin. This is an expected response so keep using the medication. If the dryness is not controlled with " moisturizing often, you may want to decrease how often you use it temporarially and slowly increase the frequency to nightly use as the dryness subsides. For most people I would recommend starting use every 3rd night for a few weeks, then increasing to every other night for a few weeks then when you feel like you can tolerate it go up to nightly use. This should help the dryness.     *Apply a gentle moisturizing CREAM. Cera Ve cream, Cetaphil Cream, or Vanicream are good options.         It may take 2-3 months to see 50-70% improvement. It is important to give the topical medications time to clean out your pores and prevent new acne from forming. Sometimes people flare with more acne after starting a new regimen and this is okay. If the flare is severe please call our office and speak with a nurse about your acne.  Otherwise, please continue use of this regimen for the next 3 months and come back for a reevaluation with me and we can adjust your plan at that time.

## 2022-11-28 ENCOUNTER — OFFICE VISIT (OUTPATIENT)
Dept: FAMILY MEDICINE | Facility: CLINIC | Age: 27
End: 2022-11-28
Payer: COMMERCIAL

## 2022-11-28 VITALS
RESPIRATION RATE: 18 BRPM | WEIGHT: 126 LBS | DIASTOLIC BLOOD PRESSURE: 85 MMHG | SYSTOLIC BLOOD PRESSURE: 127 MMHG | HEART RATE: 91 BPM | OXYGEN SATURATION: 96 % | TEMPERATURE: 98.1 F | BODY MASS INDEX: 23.79 KG/M2 | HEIGHT: 61 IN

## 2022-11-28 DIAGNOSIS — N30.00 ACUTE CYSTITIS WITHOUT HEMATURIA: Primary | ICD-10-CM

## 2022-11-28 LAB
ALBUMIN UR-MCNC: 30 MG/DL
APPEARANCE UR: ABNORMAL
BACTERIA #/AREA URNS HPF: ABNORMAL /HPF
BILIRUB UR QL STRIP: NEGATIVE
COLOR UR AUTO: YELLOW
GLUCOSE UR STRIP-MCNC: NEGATIVE MG/DL
HGB UR QL STRIP: ABNORMAL
KETONES UR STRIP-MCNC: NEGATIVE MG/DL
LEUKOCYTE ESTERASE UR QL STRIP: ABNORMAL
NITRATE UR QL: POSITIVE
PH UR STRIP: 6.5 [PH] (ref 5–7)
RBC #/AREA URNS AUTO: ABNORMAL /HPF
SP GR UR STRIP: 1.01 (ref 1–1.03)
SQUAMOUS #/AREA URNS AUTO: ABNORMAL /LPF
UROBILINOGEN UR STRIP-ACNC: 1 E.U./DL
WBC #/AREA URNS AUTO: ABNORMAL /HPF
WBC CLUMPS #/AREA URNS HPF: PRESENT /HPF

## 2022-11-28 PROCEDURE — 87186 SC STD MICRODIL/AGAR DIL: CPT | Performed by: FAMILY MEDICINE

## 2022-11-28 PROCEDURE — 81001 URINALYSIS AUTO W/SCOPE: CPT | Performed by: FAMILY MEDICINE

## 2022-11-28 PROCEDURE — 87086 URINE CULTURE/COLONY COUNT: CPT | Performed by: FAMILY MEDICINE

## 2022-11-28 PROCEDURE — 99214 OFFICE O/P EST MOD 30 MIN: CPT | Performed by: FAMILY MEDICINE

## 2022-11-28 RX ORDER — NITROFURANTOIN 25; 75 MG/1; MG/1
100 CAPSULE ORAL 2 TIMES DAILY
Qty: 14 CAPSULE | Refills: 0 | Status: SHIPPED | OUTPATIENT
Start: 2022-11-28 | End: 2022-12-05

## 2022-11-28 ASSESSMENT — PAIN SCALES - GENERAL: PAINLEVEL: SEVERE PAIN (7)

## 2022-11-28 NOTE — PROGRESS NOTES
Assessment & Plan     ASSESSMENT/PLAN:  (N30.00) Acute cystitis without hematuria  (primary encounter diagnosis)  Comment:   Plan: UA with Microscopic - lab collect, Urine         Culture Aerobic Bacterial - lab collect, Urine         Microscopic Exam, nitroFURantoin         macrocrystal-monohydrate (MACROBID) 100 MG         capsule        Return in about 1 week (around 12/5/2022) for recheck if symptoms fail to resolve by then.          Will Perez MD  [facility] STORM Bennett is a 27 year old, presenting for the following health issues:  UTI, Imm/Inj, and Back Pain      History of Present Illness       Back Pain:  She presents for follow up of back pain. Patient's back pain is a new problem.    Original cause of back pain: other  First noticed back pain: in the last week  Patient feels back pain: constantlyLocation of back pain:  Right lower back and left lower back  Description of back pain: dull ache  Back pain spreads: nowhere    Since patient first noticed back pain, pain is: gradually worsening  Does back pain interfere with her job:  No  On a scale of 1-10 (10 being the worst), patient describes pain as:  5  What makes back pain worse: bending, coughing, certain positions, lying down, sitting, standing and twisting  Acupuncture: not helpful  Acetaminophen: helpful  Activity or exercise: not helpful  Chiropractor:  Not helpful  Cold: not helpful  Heat: helpful  Massage: not helpful  Muscle relaxants: not helpful  NSAIDS: helpful  Opioids: not helpful  Physical Therapy: not helpful  Rest: not helpful  Steroid Injection: not helpful  Stretching: not helpful  Surgery: not helpful  TENS unit: not helpful  Topical pain relievers: not tried  Other healthcare providers patient is seeing for back pain: Other    She eats 2-3 servings of fruits and vegetables daily.She consumes 0 sweetened beverage(s) daily.She exercises with enough effort to increase her heart rate 30 to  "60 minutes per day.  She exercises with enough effort to increase her heart rate 4 days per week.   She is taking medications regularly.       Genitourinary - Female  Onset/Duration: 11/22/22  Description:   Painful urination (Dysuria): YES, burning            Frequency: YES  Blood in urine (Hematuria): No  Delay in urine (Hesitency): YES  Intensity: moderate  Progression of Symptoms:  worsening  Accompanying Signs & Symptoms:  Fever/chills: No  Flank pain: YES  Nausea and vomiting: No  Vaginal symptoms: none  Abdominal/Pelvic Pain: YES, suprapubic pain  History:   History of frequent UTI s:  (previous UTI on 1/18/22)  History of kidney stones: No  Sexually Active: YES  Possibility of pregnancy:   Precipitating or alleviating factors: None  Therapies tried and outcome: Pyridium, which helped her sleep through the night           Review of Systems         Objective    /85   Pulse 91   Temp 98.1  F (36.7  C) (Tympanic)   Resp 18   Ht 1.556 m (5' 1.26\")   Wt 57.2 kg (126 lb)   SpO2 96%   BMI 23.61 kg/m    Body mass index is 23.61 kg/m .  Physical Exam   GENERAL: healthy, alert and no distress  ABDOMEN: tenderness suprapubic greater than RLQ or LLQ, no organomegaly or masses and mild CVA tenderness on the right.  MS: no gross musculoskeletal defects noted, no edema    Results for orders placed or performed in visit on 11/28/22 (from the past 24 hour(s))   UA with Microscopic - lab collect   Result Value Ref Range    Color Urine Yellow Colorless, Straw, Light Yellow, Yellow    Appearance Urine Cloudy (A) Clear    Glucose Urine Negative Negative mg/dL    Bilirubin Urine Negative Negative    Ketones Urine Negative Negative mg/dL    Specific Gravity Urine 1.015 1.003 - 1.035    Blood Urine Large (A) Negative    pH Urine 6.5 5.0 - 7.0    Protein Albumin Urine 30 (A) Negative mg/dL    Urobilinogen Urine 1.0 0.2, 1.0 E.U./dL    Nitrite Urine Positive (A) Negative    Leukocyte Esterase Urine Large (A) Negative "   Urine Microscopic Exam   Result Value Ref Range    Bacteria Urine Moderate (A) None Seen /HPF    RBC Urine 5-10 (A) 0-2 /HPF /HPF    WBC Urine  (A) 0-5 /HPF /HPF    Squamous Epithelials Urine Few (A) None Seen /LPF    WBC Clumps Urine Present (A) None Seen /HPF                 This document serves as a record of the services and decisions personally performed and made by Dr. Peerz. It was created on his behalf by Ross Mcduffie, a trained medical scribe. The creation of this document is based the provider's statements to the medical scribe.  Ross Mcduffie,  8:36 AM

## 2022-11-30 LAB — BACTERIA UR CULT: ABNORMAL

## 2022-12-22 ENCOUNTER — TELEPHONE (OUTPATIENT)
Dept: FAMILY MEDICINE | Facility: CLINIC | Age: 27
End: 2022-12-22

## 2022-12-22 DIAGNOSIS — F90.2 ATTENTION DEFICIT HYPERACTIVITY DISORDER (ADHD), COMBINED TYPE: ICD-10-CM

## 2022-12-22 RX ORDER — DEXTROAMPHETAMINE SACCHARATE, AMPHETAMINE ASPARTATE, DEXTROAMPHETAMINE SULFATE AND AMPHETAMINE SULFATE 2.5; 2.5; 2.5; 2.5 MG/1; MG/1; MG/1; MG/1
10 TABLET ORAL EVERY MORNING
Qty: 60 TABLET | Refills: 0 | Status: SHIPPED | OUTPATIENT
Start: 2022-12-22 | End: 2023-03-24

## 2022-12-22 NOTE — TELEPHONE ENCOUNTER
Cub is out of Adderall 10 mg so we need new rx sent here  Jovita Garcia, Pharmacy [facility] [place] [phone]

## 2022-12-22 NOTE — TELEPHONE ENCOUNTER
Just an FYI ins would only cover a 34 ds on Adderall 10mg so filled for 34 days rest of rx is void  Jovita Garcia, Pharmacy [facility] [place] [phone]

## 2023-01-20 NOTE — TELEPHONE ENCOUNTER
Postoperative Care  Microlaryngoscopy and Bronchoscopy  Beverly Stephens MD    Becca underwent microlaryngoscopy and bronchoscopy today. Microlaryngoscopy is a method of viewing the upper airway including the pharynx (throat) and larynx (voice box). Rigid instruments are used to open up the airway, and special cameras with telescopes are used to take pictures and examine the anatomy.     Bronchoscopy is similar except the telescope is taken further down the airway into the trachea (windpipe) and bronchi which are the first two branches off of the trachea. This helps us examine the anatomy of the lower airways.     Sometimes a flexible bronchoscopy is done in conjunction with the rigid endoscopy. This is done by a pulmonologist. A flexible scope allows the surgeon to see further down into the smaller airways and obtain specimens for culture to check for infection or chronic inflammation.     Often after surgery, patients will feel groggy, nauseated, or have mild pain. The procedure itself is usually not terribly painful, but everyone experiences pain differently. Most children will only require tylenol or ibuprofen postoperatively for discomfort.     There are no dietary restrictions postoperatively unless specified. Resume the diet your child was taking prior to surgery as soon as the child is ready.     There are no activity restrictions postoperatively.     For any questions, please call our clinic our leave a My Chart message. DO NOT CALL [facility] ON CALL FOR POST OPERATIVE PROBLEMS. CALL CLINIC -[phone] OR THE [facility]  [phone] AND ASK FOR ENT ON CALL.                Pt calling to get a note faxed to her work.  She states she was seen 11/12 for a foot fracture and received a note to be on restrictions for 6-7 weeks.  She would like a new note sent to her work with her restrictions please fax to [phone] attn laurent perdomo  And call pt with any questions.  thanks

## 2023-03-21 NOTE — PROGRESS NOTES
Assessment & Plan     Attention deficit hyperactivity disorder (ADHD), combined type  - lisdexamfetamine (VYVANSE) 30 MG capsule; Take 1 capsule (30 mg) by mouth every morning  DENISE (generalized anxiety disorder)  Patient has been on Adderall 10 mg daily with decent success her symptoms but she notes when it is wearing off at the end of the day she feels more anxious and irritability.  She does have a family friend who is on Vyvanse who is having better success with anxiety/ADHD control and patient wishes to switch.  We did discuss about anxiety options including SSRIs, she previously was on Zoloft, we have discussed about Wellbutrin possibly helping her ADHD plus anxiety symptoms.  She would prefer still to go the Vyvanse throughout to see how this works for her.  We will start a low-dose and check in in a couple weeks to see how things go.  Controlled substance agreement signed today      EREN TINOCO MD  [facility]    Flynn Bennett is a 27 year old, presenting for the following health issues:  Recheck Medication      History of Present Illness       Reason for visit:  Medication    She eats 2-3 servings of fruits and vegetables daily.She consumes 0 sweetened beverage(s) daily.She exercises with enough effort to increase her heart rate 30 to 60 minutes per day.  She exercises with enough effort to increase her heart rate 5 days per week.   She is taking medications regularly.  Today's DENISE-7 Score: 7             SUBJECTIVE:  Donald is here today to recheck ADHD/ADD.    Updates since last visit:  Same as last visit    Routine for taking medicine, including time: 6am  Time medicine wears off: 1-2pm  Issues at school/work: no  Issues at home: no  Control of symptoms: not in control    Side effects:  Headaches: No  Stomach aches: No  Irritability/mood swings: Yes irritability  Difficulties with sleep: No  Social withdrawal: No  Unusual movements/tics: No  Decreased appetite:  "No    Other concerns: doesn't want to take med anymore wants something different    Patient Active Problem List   Diagnosis     DENISE (generalized anxiety disorder)     Steppage gait     Pes valgus, unspecified laterality     Attention deficit hyperactivity disorder (ADHD), combined type     Social anxiety disorder     Charcot-Marysol-Tooth disease       Past Medical History:   Diagnosis Date     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      DENISE (generalized anxiety disorder) 11/30/2013       Past Surgical History:   Procedure Laterality Date     DENTAL SURGERY      wisdom teeth       Current Outpatient Medications   Medication     clindamycin (CLEOCIN T) 1 % external lotion     famotidine (PEPCID) 20 MG tablet     fluticasone (FLONASE) 50 MCG/ACT nasal spray     lisdexamfetamine (VYVANSE) 30 MG capsule     loratadine (CLARITIN) 10 MG tablet     Phenylephrine-Polyvinyl Alc (REFRESH REDNESS RELIEF) 0.12-1.4 % SOLN     tretinoin (RETIN-A) 0.025 % external cream     VITAMIN E PO     No current facility-administered medications for this visit.       OBJECTIVE:  /76   Pulse 94   Temp 97.6  F (36.4  C) (Temporal)   Resp 18   Ht 1.557 m (5' 1.3\")   Wt 58.7 kg (129 lb 8 oz)   LMP 02/10/2023 (Exact Date)   SpO2 98%   BMI 24.23 kg/m    Gen: alert, awake, NAD  Lungs: CTA wesley  Heart: RR without murmur            Review of Systems   Constitutional, HEENT, cardiovascular, pulmonary, gi and gu systems are negative, except as otherwise noted.      Objective    /76   Pulse 94   Temp 97.6  F (36.4  C) (Temporal)   Resp 18   Ht 1.557 m (5' 1.3\")   Wt 58.7 kg (129 lb 8 oz)   LMP 02/10/2023 (Exact Date)   SpO2 98%   BMI 24.23 kg/m    Body mass index is 24.23 kg/m .  Physical Exam  Vitals and nursing note reviewed.   Constitutional:       General: She is not in acute distress.     Appearance: Normal appearance. She is not ill-appearing, toxic-appearing or diaphoretic.   HENT:      Head: " Normocephalic and atraumatic.      Right Ear: Tympanic membrane, ear canal and external ear normal. There is no impacted cerumen.      Left Ear: Tympanic membrane, ear canal and external ear normal. There is no impacted cerumen.      Nose: Nose normal. No congestion or rhinorrhea.      Mouth/Throat:      Mouth: Mucous membranes are moist.      Pharynx: Oropharynx is clear. No oropharyngeal exudate or posterior oropharyngeal erythema.   Eyes:      General:         Right eye: No discharge.         Left eye: No discharge.      Extraocular Movements: Extraocular movements intact.      Conjunctiva/sclera: Conjunctivae normal.      Pupils: Pupils are equal, round, and reactive to light.   Cardiovascular:      Rate and Rhythm: Normal rate and regular rhythm.      Heart sounds: No murmur heard.  Pulmonary:      Effort: Pulmonary effort is normal. No respiratory distress.      Breath sounds: Normal breath sounds.   Musculoskeletal:         General: Normal range of motion.      Cervical back: Normal range of motion.   Lymphadenopathy:      Cervical: No cervical adenopathy.   Neurological:      Mental Status: She is alert.   Psychiatric:         Mood and Affect: Mood normal.         Behavior: Behavior normal.         Thought Content: Thought content normal.

## 2023-03-24 ENCOUNTER — OFFICE VISIT (OUTPATIENT)
Dept: FAMILY MEDICINE | Facility: OTHER | Age: 28
End: 2023-03-24
Payer: COMMERCIAL

## 2023-03-24 VITALS
HEART RATE: 94 BPM | OXYGEN SATURATION: 98 % | SYSTOLIC BLOOD PRESSURE: 116 MMHG | BODY MASS INDEX: 24.45 KG/M2 | HEIGHT: 61 IN | TEMPERATURE: 97.6 F | DIASTOLIC BLOOD PRESSURE: 76 MMHG | RESPIRATION RATE: 18 BRPM | WEIGHT: 129.5 LBS

## 2023-03-24 DIAGNOSIS — F90.2 ATTENTION DEFICIT HYPERACTIVITY DISORDER (ADHD), COMBINED TYPE: Primary | ICD-10-CM

## 2023-03-24 DIAGNOSIS — F41.1 GAD (GENERALIZED ANXIETY DISORDER): ICD-10-CM

## 2023-03-24 PROCEDURE — 99214 OFFICE O/P EST MOD 30 MIN: CPT | Performed by: STUDENT IN AN ORGANIZED HEALTH CARE EDUCATION/TRAINING PROGRAM

## 2023-03-24 RX ORDER — LISDEXAMFETAMINE DIMESYLATE 30 MG/1
30 CAPSULE ORAL EVERY MORNING
Qty: 30 CAPSULE | Refills: 0 | Status: SHIPPED | OUTPATIENT
Start: 2023-03-24 | End: 2023-04-07

## 2023-03-24 ASSESSMENT — ANXIETY QUESTIONNAIRES
5. BEING SO RESTLESS THAT IT IS HARD TO SIT STILL: SEVERAL DAYS
7. FEELING AFRAID AS IF SOMETHING AWFUL MIGHT HAPPEN: NOT AT ALL
IF YOU CHECKED OFF ANY PROBLEMS ON THIS QUESTIONNAIRE, HOW DIFFICULT HAVE THESE PROBLEMS MADE IT FOR YOU TO DO YOUR WORK, TAKE CARE OF THINGS AT HOME, OR GET ALONG WITH OTHER PEOPLE: SOMEWHAT DIFFICULT
7. FEELING AFRAID AS IF SOMETHING AWFUL MIGHT HAPPEN: NOT AT ALL
GAD7 TOTAL SCORE: 7
2. NOT BEING ABLE TO STOP OR CONTROL WORRYING: SEVERAL DAYS
6. BECOMING EASILY ANNOYED OR IRRITABLE: MORE THAN HALF THE DAYS
GAD7 TOTAL SCORE: 7
1. FEELING NERVOUS, ANXIOUS, OR ON EDGE: SEVERAL DAYS
3. WORRYING TOO MUCH ABOUT DIFFERENT THINGS: SEVERAL DAYS
4. TROUBLE RELAXING: SEVERAL DAYS
8. IF YOU CHECKED OFF ANY PROBLEMS, HOW DIFFICULT HAVE THESE MADE IT FOR YOU TO DO YOUR WORK, TAKE CARE OF THINGS AT HOME, OR GET ALONG WITH OTHER PEOPLE?: SOMEWHAT DIFFICULT

## 2023-03-24 ASSESSMENT — PAIN SCALES - GENERAL: PAINLEVEL: NO PAIN (0)

## 2023-03-24 NOTE — PATIENT INSTRUCTIONS
Wellbutrin 150 mg   Selective serotonin reuptake inhibitor (Zoloft, Lexapro, Celexa, Prozac, Paxil)

## 2023-03-24 NOTE — LETTER
[facility] [place]  03/24/23  Patient: Donald Duarte  YOB: 1995  Medical Record Number: [id number]                                                                                  Non-Opioid Controlled Substance Agreement    This is an agreement between you and your provider regarding safe and appropriate use of controlled substances prescribed by your care team. Controlled substances are?medicines that can cause physical and mental dependence (abuse).     There are strict laws about having and using these medicines. We here at [facility] are  committed to working with you in your efforts to get better. To support you in this work, we'll help you schedule regular office appointments for medicine refills. If we must cancel or change your appointment for any reason, we'll make sure you have enough medicine to last until your next appointment.     As a Provider, I will:     Listen carefully to your concerns while treating you with respect.     Recommend a treatment plan that I believe is in your best interest and may involve therapies other than medicine.      Talk with you often about the possible benefits and the risk of harm of any medicine that we prescribe for you.    Assess the safety of this medicine and check how well it works.      Provide a plan on how to taper (discontinue or go off) using this medicine if the decision is made to stop its use.      ::  As a Patient, I understand controlled substances:       Are prescribed by my care provider to help me function or work and manage my condition(s).?    Are strong medicines and can cause serious side effects.       Need to be taken exactly as prescribed.?Combining controlled substances with certain medicines or chemicals (such as illegal drugs, alcohol, sedatives, sleeping pills, and benzodiazepines) can be dangerous or even fatal.? If I stop taking my medicines suddenly, I may have severe withdrawal symptoms.     The  risks, benefits, and side effects of these medicine(s) were explained to me. I agree that:    1. I will take part in other treatments as advised by my care team. This may be psychiatry or counseling, physical therapy, behavioral therapy, group treatment or a referral to specialist.    2. I will keep all my appointments and understand this is part of the monitoring of controlled substances.?My care team may require an office visit for EVERY controlled substance refill. If I miss appointments or don t follow instructions, my care team may stop my medicine    3. I will take my medicines as prescribed. I will not change the dose or schedule unless my care team tells me to. There will be no refills if I run out early.      4. I may be asked to come to the clinic and complete a urine drug test or complete a pill count. If I don t give a urine sample or participate in a pill count, the care team may stop my medicine.    5. I will only receive controlled substance prescriptions from this clinic. If I am treated by another provider, I will tell them that I am taking controlled substances and that I have a treatment agreement with this provider. I will inform my [facility] care team within one business day if I am given a prescription for any controlled substance by another healthcare provider. My [facility] care team can contact other providers and pharmacists about my use of any medicines.    6. It is up to me to make sure that I don't run out of my medicines on weekends or holidays.?If my care team is willing to refill my prescription without a visit, I must request refills only during office hours. Refills may take up to 3 business days to process. I will use one pharmacy to fill all my controlled substance prescriptions. I will notify the clinic about any changes to my insurance or medicine availability.    7. I am responsible for my prescriptions. If the medicine/prescription is lost, stolen or destroyed,  it will not be replaced.?I also agree not to share controlled substance medicines with anyone.     8. I am aware I should not use any illegal or recreational drugs. I agree not to drink alcohol unless my care team says I can.     9. If I enroll in the [place] Medical Cannabis program, I will tell my care team before my next refill.    10. I will tell my care team right away if I become pregnant, have a new medical problem treated outside of my regular clinic, or have a change in my medicines.     11. I understand that this medicine can affect my thinking, judgment and reaction time.? Alcohol and drugs affect the brain and body, which can affect the safety of my driving. Being under the influence of alcohol or drugs can affect my decision-making, behaviors, personal safety and the safety of others. Driving while impaired (DWI) can occur if a person is driving, operating or in physical control of a car, motorcycle, boat, snowmobile, ATV, motorbike, off-road vehicle or any other motor vehicle ([place] Statute 169[place].20). I understand the risk if I choose to drive or operate any vehicle or machinery.    I understand that if I do not follow any of the conditions above, my prescriptions or treatment may be stopped or changed.   I agree that my provider, clinic care team and pharmacy may work with any city, state or federal law enforcement agency that investigates the misuse, sale or other diversion of my controlled medicine. I will allow my provider to discuss my care with, or share a copy of, this agreement with any other treating provider, pharmacy or emergency room where I receive care.     I have read this agreement and have asked questions about anything I did not understand.    ________________________________________________________  Patient Signature - Donald Duarte     ___________________                   Date     ________________________________________________________  Provider Signature - EREN TINOCO,  MD       ___________________                   Date     ________________________________________________________  Witness Signature (required if provider not present while patient signing)          ___________________                   Date

## 2023-04-07 ENCOUNTER — VIRTUAL VISIT (OUTPATIENT)
Dept: FAMILY MEDICINE | Facility: OTHER | Age: 28
End: 2023-04-07
Payer: COMMERCIAL

## 2023-04-07 DIAGNOSIS — F90.2 ATTENTION DEFICIT HYPERACTIVITY DISORDER (ADHD), COMBINED TYPE: ICD-10-CM

## 2023-04-07 PROCEDURE — 99214 OFFICE O/P EST MOD 30 MIN: CPT | Mod: VID | Performed by: STUDENT IN AN ORGANIZED HEALTH CARE EDUCATION/TRAINING PROGRAM

## 2023-04-07 RX ORDER — LISDEXAMFETAMINE DIMESYLATE 30 MG/1
30 CAPSULE ORAL EVERY MORNING
Qty: 30 CAPSULE | Refills: 0 | Status: SHIPPED | OUTPATIENT
Start: 2023-04-17 | End: 2023-12-15 | Stop reason: ALTCHOICE

## 2023-04-07 ASSESSMENT — ANXIETY QUESTIONNAIRES
IF YOU CHECKED OFF ANY PROBLEMS ON THIS QUESTIONNAIRE, HOW DIFFICULT HAVE THESE PROBLEMS MADE IT FOR YOU TO DO YOUR WORK, TAKE CARE OF THINGS AT HOME, OR GET ALONG WITH OTHER PEOPLE: NOT DIFFICULT AT ALL
5. BEING SO RESTLESS THAT IT IS HARD TO SIT STILL: NOT AT ALL
1. FEELING NERVOUS, ANXIOUS, OR ON EDGE: NOT AT ALL
GAD7 TOTAL SCORE: 1
4. TROUBLE RELAXING: SEVERAL DAYS
GAD7 TOTAL SCORE: 1
7. FEELING AFRAID AS IF SOMETHING AWFUL MIGHT HAPPEN: NOT AT ALL
2. NOT BEING ABLE TO STOP OR CONTROL WORRYING: NOT AT ALL
6. BECOMING EASILY ANNOYED OR IRRITABLE: NOT AT ALL
8. IF YOU CHECKED OFF ANY PROBLEMS, HOW DIFFICULT HAVE THESE MADE IT FOR YOU TO DO YOUR WORK, TAKE CARE OF THINGS AT HOME, OR GET ALONG WITH OTHER PEOPLE?: NOT DIFFICULT AT ALL
7. FEELING AFRAID AS IF SOMETHING AWFUL MIGHT HAPPEN: NOT AT ALL
3. WORRYING TOO MUCH ABOUT DIFFERENT THINGS: NOT AT ALL

## 2023-04-07 NOTE — PROGRESS NOTES
Donald is a 27 year old who is being evaluated via a billable video visit.      How would you like to obtain your AVS? MyChart  If the video visit is dropped, the invitation should be resent by: Text to cell phone: [phone]  Will anyone else be joining your video visit? No          Assessment & Plan     Attention deficit hyperactivity disorder (ADHD), combined type  Patient feels like her focus is much better since witching to Vyvanse compared to her previous Adderall prescription.  She typically takes it first thing in the morning around 6 to 7 AM and the medication lasts until about 3 PM at the end of her workday.  She has felt 1 day that it may be and it will earlier but overall is not a bother to her.  She prefers to stay at her current prescription dosage which I agree with, will have a 6-week follow-up to see how she is doing and will consider 3-month follow-ups thereafter.  Also of note she mentioned that she has less agitation at the end of the day which she feels like her Adderall was causing her especially when it was weaning off.  - lisdexamfetamine (VYVANSE) 30 MG capsule; Take 1 capsule (30 mg) by mouth every morning        EREN TINOCO MD  [facility]   Donald is a 27 year old, presenting for the following health issues:  A.D.H.D        4/7/2023     9:19 AM   Additional Questions   Roomed by Brenda Narayanan   Accompanied by N/A     A.D.H.D    History of Present Illness       Reason for visit:  ADHD follow up    She eats 2-3 servings of fruits and vegetables daily.She consumes 0 sweetened beverage(s) daily.She exercises with enough effort to increase her heart rate 30 to 60 minutes per day.  She exercises with enough effort to increase her heart rate 5 days per week. She is missing 2 dose(s) of medications per week.  She is not taking prescribed medications regularly due to other.  Today's DENISE-7 Score: 1           Review of Systems   Constitutional,  "HEENT, cardiovascular, pulmonary, gi and gu systems are negative, except as otherwise noted.      Objective    Vitals - Patient Reported  Weight (Patient Reported): 61.2 kg (135 lb)  Height (Patient Reported): 157.5 cm (5' 2\")  BMI (Based on Pt Reported Ht/Wt): 24.69  Pain Score: No Pain (0)        Physical Exam   GENERAL: Healthy, alert and no distress  EYES: Eyes grossly normal to inspection.  No discharge or erythema, or obvious scleral/conjunctival abnormalities.  RESP: No audible wheeze, cough, or visible cyanosis.  No visible retractions or increased work of breathing.    SKIN: Visible skin clear. No significant rash, abnormal pigmentation or lesions.  NEURO: Cranial nerves grossly intact.  Mentation and speech appropriate for age.  PSYCH: Mentation appears normal, affect normal/bright, judgement and insight intact, normal speech and appearance well-groomed.          Video-Visit Details    Type of service:  Video Visit   Video Start Time: 9:39 AM  Video End Time:9:48    Originating Location (pt. Location): Home    Distant Location (provider location):  On-site  Platform used for Video Visit: Kathleen    "

## 2023-04-13 ENCOUNTER — OFFICE VISIT (OUTPATIENT)
Dept: OBGYN | Facility: CLINIC | Age: 28
End: 2023-04-13
Payer: COMMERCIAL

## 2023-04-13 VITALS
WEIGHT: 125 LBS | OXYGEN SATURATION: 100 % | SYSTOLIC BLOOD PRESSURE: 126 MMHG | BODY MASS INDEX: 23.39 KG/M2 | DIASTOLIC BLOOD PRESSURE: 83 MMHG | HEART RATE: 83 BPM

## 2023-04-13 DIAGNOSIS — F90.2 ATTENTION DEFICIT HYPERACTIVITY DISORDER (ADHD), COMBINED TYPE: ICD-10-CM

## 2023-04-13 DIAGNOSIS — N91.5 OLIGOMENORRHEA, UNSPECIFIED TYPE: Primary | ICD-10-CM

## 2023-04-13 DIAGNOSIS — G60.0 CHARCOT-MARIE-TOOTH DISEASE: ICD-10-CM

## 2023-04-13 LAB
ALBUMIN SERPL-MCNC: 4.1 G/DL (ref 3.4–5)
ALP SERPL-CCNC: 82 U/L (ref 40–150)
ALT SERPL W P-5'-P-CCNC: 19 U/L (ref 0–50)
ANION GAP SERPL CALCULATED.3IONS-SCNC: 3 MMOL/L (ref 3–14)
AST SERPL W P-5'-P-CCNC: 13 U/L (ref 0–45)
BILIRUB SERPL-MCNC: 0.6 MG/DL (ref 0.2–1.3)
BUN SERPL-MCNC: 10 MG/DL (ref 7–30)
C TRACH DNA SPEC QL NAA+PROBE: NEGATIVE
CALCIUM SERPL-MCNC: 9.5 MG/DL (ref 8.5–10.1)
CHLORIDE BLD-SCNC: 106 MMOL/L (ref 94–109)
CHOLEST SERPL-MCNC: 134 MG/DL
CO2 SERPL-SCNC: 27 MMOL/L (ref 20–32)
CREAT SERPL-MCNC: 0.68 MG/DL (ref 0.52–1.04)
FASTING STATUS PATIENT QL REPORTED: YES
GFR SERPL CREATININE-BSD FRML MDRD: >90 ML/MIN/1.73M2
GLUCOSE BLD-MCNC: 85 MG/DL (ref 70–99)
HDLC SERPL-MCNC: 61 MG/DL
LDLC SERPL CALC-MCNC: 64 MG/DL
N GONORRHOEA DNA SPEC QL NAA+PROBE: NEGATIVE
NONHDLC SERPL-MCNC: 73 MG/DL
POTASSIUM BLD-SCNC: 4 MMOL/L (ref 3.4–5.3)
PROT SERPL-MCNC: 8.5 G/DL (ref 6.8–8.8)
SODIUM SERPL-SCNC: 136 MMOL/L (ref 133–144)
TRIGL SERPL-MCNC: 43 MG/DL
TSH SERPL DL<=0.005 MIU/L-ACNC: 0.95 MU/L (ref 0.4–4)

## 2023-04-13 PROCEDURE — 84443 ASSAY THYROID STIM HORMONE: CPT

## 2023-04-13 PROCEDURE — 80053 COMPREHEN METABOLIC PANEL: CPT

## 2023-04-13 PROCEDURE — 99214 OFFICE O/P EST MOD 30 MIN: CPT

## 2023-04-13 PROCEDURE — 80061 LIPID PANEL: CPT

## 2023-04-13 PROCEDURE — 84270 ASSAY OF SEX HORMONE GLOBUL: CPT

## 2023-04-13 PROCEDURE — 84403 ASSAY OF TOTAL TESTOSTERONE: CPT

## 2023-04-13 PROCEDURE — 87591 N.GONORRHOEAE DNA AMP PROB: CPT

## 2023-04-13 PROCEDURE — 84146 ASSAY OF PROLACTIN: CPT

## 2023-04-13 PROCEDURE — 87491 CHLMYD TRACH DNA AMP PROBE: CPT

## 2023-04-13 PROCEDURE — 36415 COLL VENOUS BLD VENIPUNCTURE: CPT

## 2023-04-13 NOTE — PROGRESS NOTES
"  SUBJECTIVE:     HPI: Donald Duarte is a 27 year old  who presents today for consultation regarding abnormal bleeding and discussion of evaluation of future fertility.    Currently menses occur every rarely and irergulary, she feels they have more of an every other month pattern and lasting \"less than 1 day.\" Flow is described as moderate for this brief time, then just tapers off.   Cramping and breast tenderness premenstrual.  Her periods historically were regular, monthly, and moderate flow.   She then had Mirena IUD placed in . Periods were light and irregular with IUD. IUD was removed in . Patient reports periods after IUD removed were rare, irregular and short.  She was placed on KATIE with improvement in cycle regularity. She states she had regular bleeds and they lasted 3-4 days on KATIE.  She chose to discontinue KATIE when she was no longer sexually active and her periods returned to the same irregular, rare and short pattern.     Menarche 14.   Patient denies intermenstrual bleeding.  Patient denies post-coital bleeding.    Patient has not been sexually active in some time though she has been in a sexual relationhip since last STI screen and would like STI screening today.    Previous pelvic/abdominal procedures: IUD palced , removed     She denies excessive hair growth or weight gain.  Positive for increased acne after IUD removed, made worse with KATIE,    No known history of PCOS.  No known history of endometriosis.  Patient denies hot flashes, vaginal dryness, night sweats.     She denies vaginal discharge, dysmenorrhea, dyspareunia.   She denies fevers/chills, nausea/vomiting, abdominal pain or bloating.   She denies dysuria, hematuria, constipation or diarrhea.   She denies visual changes, headaches or galactorrhea.  No significant change in weight and no pattern of excessive exercise or dietary restrictions.    Ob Hx: .      Gyn Hx: Patient's last menstrual period was " 04/04/2023 (exact date).     Last pap was 1/11/2021, history of abnormal paps. Next pap due 2024   STI history Denies  STD testing offered?  Accepted    Medication Hx:   Current medications reviewed with patient. Yes   Any history of exogenous hormone use? Yes, Mirena IUD and KATIE but non currently.    Family Hx:  Family history of endometriosis, bleeding disorder: Deneis   Family history of gyn-related malignancies including breast, ovarian, uterine: Paternal Aunt breast cancer     reports that she has never smoked. She has never used smokeless tobacco.  Denies illicit drug use.    Today's PHQ-2 Score:     3/24/2023     7:06 AM   PHQ-2 ( 1999 [employer])   Q1: Little interest or pleasure in doing things 1   Q2: Feeling down, depressed or hopeless 1   PHQ-2 Score 2   Q1: Little interest or pleasure in doing things Several days   Q2: Feeling down, depressed or hopeless Several days   PHQ-2 Score 2     Today's PHQ-9 Score:       2/4/2022    10:43 AM   PHQ-9 SCORE   PHQ-9 Total Score MyChart 2 (Minimal depression)   PHQ-9 Total Score 2     Today's DENISE-7 Score:       4/7/2023     9:18 AM   DENISE-7 SCORE   Total Score 1 (minimal anxiety)   Total Score 1         Problem list and histories reviewed & adjusted, as indicated.  Additional history: as documented.    Patient Active Problem List   Diagnosis     DENISE (generalized anxiety disorder)     Steppage gait     Pes valgus, unspecified laterality     Attention deficit hyperactivity disorder (ADHD), combined type     Social anxiety disorder     Charcot-Marysol-Tooth disease     Past Surgical History:   Procedure Laterality Date     DENTAL SURGERY      wisdom teeth      Social History     Tobacco Use     Smoking status: Never     Smokeless tobacco: Never   Vaping Use     Vaping status: Never Used   Substance Use Topics     Alcohol use: No      Problem (# of Occurrences) Relation (Name,Age of Onset)    Arthritis (1) Paternal Grandmother: lupus    Hypertension (1) Mother    Breast  Cancer (1) Paternal Aunt            clindamycin (CLEOCIN T) 1 % external lotion, Apply topically every morning To all areas of acne  famotidine (PEPCID) 20 MG tablet, Take 1 tablet (20 mg) by mouth 2 times daily as needed (allergies)  fluticasone (FLONASE) 50 MCG/ACT nasal spray, Spray 1 spray into both nostrils daily  [START ON 2023] lisdexamfetamine (VYVANSE) 30 MG capsule, Take 1 capsule (30 mg) by mouth every morning  loratadine (CLARITIN) 10 MG tablet, Take 1 tablet (10 mg) by mouth daily  Phenylephrine-Polyvinyl Alc (REFRESH REDNESS RELIEF) 0.12-1.4 % SOLN, Apply 1 drop to eye 2 times daily as needed  tretinoin (RETIN-A) 0.025 % external cream, Use every night to all areas of acne  VITAMIN E PO, Take by mouth daily    No current facility-administered medications on file prior to visit.    No Known Allergies    ROS:  10 Point review of systems negative other noted above in HPI    OBJECTIVE:     EXAM:  Blood pressure 126/83, pulse 83, weight 56.7 kg (125 lb), last menstrual period 2023, SpO2 100 %, not currently breastfeeding. Body mass index is 23.39 kg/m .  General - Pleasant female in no acute distress.  Skin - No suspicious lesions, ecchymosis, or rashes. No acne to the face, chest back. No male pattern hair loss or hirsutism.   EENT-  PERRLA  Lungs - respirations ar easy even and unalbored  Musculoskeletal - no gross deformities.  Neurological - normal strength, sensation, and mental status.    Pelvic exam declined. PAP up to date and no symptoms of infection. Deferred at this time. Will reconsider in the future.      ASSESSMENT/PLAN:                                                      Donald Duarte is a 27 year old  who presents today for consultation regarding abnormal bleeding and concerns regarding her future fertility. Her medical history is significant for Charcot-Marysol Tooth syndrome and ADHD.    (N91.5) Oligomenorrhea, unspecified type  (primary encounter diagnosis)  Comment:  Differentials include but are not limited to hypothalamic amenorrhea, thyroid disease, pituitary disease, hyperandrogenic disorder and primary ovarian insufficiency. We discussed plan for lab work to evaluate for these conditions and will follow-up with next steps when results are in. We discussed that although periods are irregular pregnancy is possible and advised the use of a daily PNV.  Plan: Comprehensive metabolic panel, Lipid panel         reflex to direct LDL Fasting, Prolactin,         Testosterone Free and Total, TSH with free T4         reflex, NEISSERIA GONORRHOEA PCR, CHLAMYDIA         TRACHOMATIS PCR, Estradiol   Daily multivitamin or PNV with folic Acid   Plan to follow-up via MyChart or phone with lab result sand next steps.    30 minutes spent on the date of the encounter doing chart review, history and exam, documentation and further activities as noted above    JAMES Chamorro CNP [facility]

## 2023-04-13 NOTE — PATIENT INSTRUCTIONS
If you have any questions regarding your visit, Please contact your care team.     [facility]: [phone]  To Schedule an Appointment 24/7  Call: [phone] HOURS TELEPHONE NUMBER   Karen Lord, VIJAY, APRN, NP-BC    Radha -Surgery Scheduler  Simi - Surgery Scheduler    [place] RN  Cori, RN  Emely, GRACE        [facility]  [address] [place] [address] [phone] Phone  [phone] Fax    Imaging Scheduling-All Locations [phone]    [facility]  [address] [place] [address]     Urgent Care locations:  [facility] Monday-Friday   10 am - 8 pm  Saturday and Sunday   9 am - 5 pm [phone]   [facility] Labor and Delivery:  [phone]    If you need a medication refill, please contact your pharmacy. Please allow 3 business days for your refill to be completed.  As always, Thank you for trusting us with your healthcare needs!  see additional instructions from your care team below

## 2023-04-14 ENCOUNTER — MYC MEDICAL ADVICE (OUTPATIENT)
Dept: FAMILY MEDICINE | Facility: OTHER | Age: 28
End: 2023-04-14
Payer: COMMERCIAL

## 2023-04-14 LAB
PROLACTIN SERPL 3RD IS-MCNC: 14 NG/ML (ref 5–23)
SHBG SERPL-SCNC: 87 NMOL/L (ref 30–135)

## 2023-04-17 NOTE — TELEPHONE ENCOUNTER
Does her landlord have a specific form for me to fill out or does a generic letter work?  If a letter works, I can send this at any time.

## 2023-04-17 NOTE — TELEPHONE ENCOUNTER
Letter sent to her MyChart      Thank you,    Thom Sweet MD    [address] [address]   Ph: [phone]  Fax:[phone]

## 2023-04-17 NOTE — TELEPHONE ENCOUNTER
Patient had office visit 3/24/23 for ADHD and anxiety.   Virtual visit 4/7/23 for ADHD.     Would you be able to provide patient with letter for service animal?    Ade LANEN, RN  [facility]

## 2023-04-17 NOTE — TELEPHONE ENCOUNTER
Service and emotional support animals are different and when it comes to landlords this can be tricky     What she is mentioning of is more of a emotional support animal and not all landlords need to abide by these types of animals/accommodations. This differs from a service animal which typically is for a physical impairment (for example someone who is blind has a seeing-eye dog to help them around).     We can talk more about this over our next follow up, I can do a letter at that time but at the end of the day, the landlord may or may not abide by the letter for her rental      Thank you,    Thom Sweet MD    [address] [address]   Ph: [phone]  Fax:[phone]

## 2023-04-19 LAB
TESTOST FREE SERPL-MCNC: 0.31 NG/DL
TESTOST SERPL-MCNC: 34 NG/DL (ref 8–60)

## 2023-04-22 DIAGNOSIS — N92.6 IRREGULAR MENSTRUAL CYCLE: Primary | ICD-10-CM

## 2023-06-27 ENCOUNTER — ANCILLARY PROCEDURE (OUTPATIENT)
Dept: GENERAL RADIOLOGY | Facility: CLINIC | Age: 28
End: 2023-06-27
Attending: PHYSICIAN ASSISTANT
Payer: COMMERCIAL

## 2023-06-27 ENCOUNTER — OFFICE VISIT (OUTPATIENT)
Dept: URGENT CARE | Facility: URGENT CARE | Age: 28
End: 2023-06-27
Payer: COMMERCIAL

## 2023-06-27 VITALS
BODY MASS INDEX: 24.7 KG/M2 | WEIGHT: 132 LBS | OXYGEN SATURATION: 97 % | DIASTOLIC BLOOD PRESSURE: 77 MMHG | SYSTOLIC BLOOD PRESSURE: 116 MMHG | HEART RATE: 84 BPM | TEMPERATURE: 98.8 F

## 2023-06-27 DIAGNOSIS — J02.9 SORE THROAT: ICD-10-CM

## 2023-06-27 DIAGNOSIS — J20.9 ACUTE BRONCHITIS, UNSPECIFIED ORGANISM: Primary | ICD-10-CM

## 2023-06-27 PROCEDURE — 99213 OFFICE O/P EST LOW 20 MIN: CPT | Performed by: PHYSICIAN ASSISTANT

## 2023-06-27 PROCEDURE — 71046 X-RAY EXAM CHEST 2 VIEWS: CPT | Mod: TC | Performed by: RADIOLOGY

## 2023-06-27 RX ORDER — ALBUTEROL SULFATE 90 UG/1
1-2 AEROSOL, METERED RESPIRATORY (INHALATION) EVERY 6 HOURS PRN
Qty: 8.5 G | Refills: 0 | Status: SHIPPED | OUTPATIENT
Start: 2023-06-27 | End: 2023-06-30

## 2023-07-12 NOTE — PROGRESS NOTES
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      DENISE (generalized anxiety disorder) 11/30/2013       Current Outpatient Medications   Medication Sig Dispense Refill     albuterol (PROAIR HFA/PROVENTIL HFA/VENTOLIN HFA) 108 (90 Base) MCG/ACT inhaler Inhale 1-2 puffs into the lungs every 6 hours as needed for shortness of breath, wheezing or cough 8.5 g 0     clindamycin (CLEOCIN T) 1 % external lotion Apply topically every morning To all areas of acne 60 mL 11     famotidine (PEPCID) 20 MG tablet Take 1 tablet (20 mg) by mouth 2 times daily as needed (allergies) 60 tablet 1     fluticasone (FLONASE) 50 MCG/ACT nasal spray Spray 1 spray into both nostrils daily 15.8 mL 1     loratadine (CLARITIN) 10 MG tablet Take 1 tablet (10 mg) by mouth daily 90 tablet 0     Phenylephrine-Polyvinyl Alc (REFRESH REDNESS RELIEF) 0.12-1.4 % SOLN Apply 1 drop to eye 2 times daily as needed 1 Bottle 0     tretinoin (RETIN-A) 0.025 % external cream Use every night to all areas of acne 45 g 11     VITAMIN E PO Take by mouth daily       lisdexamfetamine (VYVANSE) 30 MG capsule Take 1 capsule (30 mg) by mouth every morning (Patient not taking: Reported on 6/27/2023) 30 capsule 0       Social History     Tobacco Use     Smoking status: Never     Smokeless tobacco: Never   Substance Use Topics     Alcohol use: No       ROS  Review of systems negative except as stated above.    OBJECTIVE:  /77   Pulse 84   Temp 98.8  F (37.1  C) (Tympanic)   Wt 59.9 kg (132 lb)   SpO2 97%   BMI 24.70 kg/m    GENERAL APPEARANCE: healthy, alert and no distress  EYES: EOMI,  PERRL, conjunctiva  clear  HENT: ear canals and TM's normal.  Nose and mouth without ulcers, erythema or lesions  NECK: supple, nontender, no lymphadenopathy  RESP: lungs clear to auscultation - no rales, rhonchi or wheezes  CV: regular rates and rhythm, normal S1 S2, no murmur noted  NEURO: Normal strength and tone, sensory exam grossly normal,  normal speech and mentation  SKIN: no suspicious lesions or rashes      Results for orders placed or performed in visit on 06/27/23   XR Chest 2 Views     Status: None    Narrative    EXAM: XR CHEST 2 VIEWS  LOCATION: [facility]  DATE: 6/27/2023    INDICATION:  Sore throat  COMPARISON: None.      Impression    IMPRESSION: Negative chest.       ASSESSMENT:    (J20.9) Acute bronchitis, unspecified organism  (primary encounter diagnosis)  Plan: XR Chest 2 Views, albuterol (PROAIR         HFA/PROVENTIL HFA/VENTOLIN HFA) 108 (90 Base)         MCG/ACT inhaler, CANCELED: Streptococcus A         Rapid Screen w/Reflex to PCR - Clinic Collect      Follow up with PCP if symptoms worsen or fail to improve

## 2023-08-19 ENCOUNTER — HEALTH MAINTENANCE LETTER (OUTPATIENT)
Age: 28
End: 2023-08-19

## 2023-09-08 ENCOUNTER — OFFICE VISIT (OUTPATIENT)
Dept: INTERNAL MEDICINE | Facility: CLINIC | Age: 28
End: 2023-09-08
Payer: COMMERCIAL

## 2023-09-08 VITALS
OXYGEN SATURATION: 98 % | DIASTOLIC BLOOD PRESSURE: 74 MMHG | SYSTOLIC BLOOD PRESSURE: 108 MMHG | HEIGHT: 61 IN | HEART RATE: 89 BPM | BODY MASS INDEX: 25.86 KG/M2 | WEIGHT: 137 LBS

## 2023-09-08 DIAGNOSIS — Z12.4 SCREENING FOR CERVICAL CANCER: ICD-10-CM

## 2023-09-08 DIAGNOSIS — F90.2 ATTENTION DEFICIT HYPERACTIVITY DISORDER (ADHD), COMBINED TYPE: ICD-10-CM

## 2023-09-08 DIAGNOSIS — Z11.3 ROUTINE SCREENING FOR STI (SEXUALLY TRANSMITTED INFECTION): ICD-10-CM

## 2023-09-08 DIAGNOSIS — Z00.00 ROUTINE HISTORY AND PHYSICAL EXAMINATION OF ADULT: Primary | ICD-10-CM

## 2023-09-08 DIAGNOSIS — L98.9 SKIN LESION: ICD-10-CM

## 2023-09-08 PROCEDURE — 87591 N.GONORRHOEAE DNA AMP PROB: CPT | Performed by: NURSE PRACTITIONER

## 2023-09-08 PROCEDURE — 99000 SPECIMEN HANDLING OFFICE-LAB: CPT | Performed by: PATHOLOGY

## 2023-09-08 PROCEDURE — 99395 PREV VISIT EST AGE 18-39: CPT | Performed by: NURSE PRACTITIONER

## 2023-09-08 PROCEDURE — 87491 CHLMYD TRACH DNA AMP PROBE: CPT | Performed by: NURSE PRACTITIONER

## 2023-09-08 PROCEDURE — G0145 SCR C/V CYTO,THINLAYER,RESCR: HCPCS | Performed by: NURSE PRACTITIONER

## 2023-09-08 RX ORDER — DEXTROAMPHETAMINE SACCHARATE, AMPHETAMINE ASPARTATE, DEXTROAMPHETAMINE SULFATE AND AMPHETAMINE SULFATE 2.5; 2.5; 2.5; 2.5 MG/1; MG/1; MG/1; MG/1
10 TABLET ORAL 2 TIMES DAILY
Qty: 60 TABLET | Refills: 0 | Status: SHIPPED | OUTPATIENT
Start: 2023-10-08 | End: 2024-02-07

## 2023-09-08 RX ORDER — DEXTROAMPHETAMINE SACCHARATE, AMPHETAMINE ASPARTATE, DEXTROAMPHETAMINE SULFATE AND AMPHETAMINE SULFATE 2.5; 2.5; 2.5; 2.5 MG/1; MG/1; MG/1; MG/1
10 TABLET ORAL 2 TIMES DAILY
Qty: 60 TABLET | Refills: 0 | Status: SHIPPED | OUTPATIENT
Start: 2023-11-07 | End: 2024-01-04

## 2023-09-08 RX ORDER — DEXTROAMPHETAMINE SACCHARATE, AMPHETAMINE ASPARTATE, DEXTROAMPHETAMINE SULFATE AND AMPHETAMINE SULFATE 2.5; 2.5; 2.5; 2.5 MG/1; MG/1; MG/1; MG/1
10 TABLET ORAL 2 TIMES DAILY
Qty: 60 TABLET | Refills: 0 | Status: SHIPPED | OUTPATIENT
Start: 2023-09-08 | End: 2024-02-07

## 2023-09-08 NOTE — NURSING NOTE
"Donald Duarte is a 28 year old female patient that presents today in clinic for the following:    Chief Complaint   Patient presents with    Refill Request    Physical     Pt here for physical and would like to discuss sore spot on head and lump in inner thigh area (that was noticed 6 months ago).     The patient's allergies and medications were reviewed as noted. A set of vitals were recorded as noted without incident: /74 (BP Location: Right arm, Patient Position: Sitting, Cuff Size: Adult Regular)   Pulse 89   Ht 1.557 m (5' 1.3\")   Wt 62.1 kg (137 lb)   LMP 08/14/2023 (Approximate)   SpO2 98%   BMI 25.63 kg/m  . The patient does not have any other questions for the provider.    Zarina Martin, EMT at 4:04 PM on 9/8/2023  "

## 2023-09-08 NOTE — PROGRESS NOTES
"SUBJECTIVE:  Donald Duarte is a 28 year old female with pmh of   Patient Active Problem List   Diagnosis    DENISE (generalized anxiety disorder)    Steppage gait    Pes valgus, unspecified laterality    Attention deficit hyperactivity disorder (ADHD), combined type    Social anxiety disorder    Charcot-Marysol-Tooth disease     who comes in for preventive care examination today.   She has the following concerns     ADD--per chart review, diagnosis confirmed with psychological evaluation in 2016 at [facility]. She was on Adderall for years, then switched to Vyvanse d/t wearing off effect, but Vyvanse was too costly. XR lasted too long, prefers IR.      She recently started classes at [employer], considering a master's in psychology.    She works as a  at [employer] in [employer].     Mood doing well.  Exercises most days with walking.    Has a \"marble\"-sized lump under the skin in the groin, estimates has been there about 6 months. Can be tender at times. No bleeding or draining.     Menses:  Patient's last menstrual period was 08/14/2023 (approximate).  Menstrual cycles are regular    PAP HX:   Last   Lab Results   Component Value Date    PAP NIL 01/11/2021     History of abnormal None    Breast:   Patient performs self breast exams  Yes   Breast concerns No  No results found.    Sexual Hx:  Sexually active  yes, single partner, contraception - condoms  Partner(s) are  male   IS interested in STD testing    Pregnancy:   none      Medications and allergies reviewed by me today.     Family History   Problem Relation Age of Onset    Hypertension Mother     Arthritis Paternal Grandmother         lupus    Breast Cancer Paternal Aunt        Social History     Tobacco Use    Smoking status: Never    Smokeless tobacco: Never   Vaping Use    Vaping Use: Never used   Substance Use Topics    Alcohol use: Yes     Comment: 5 per month    Drug use: No       Immunization History   Administered Date(s) " "Administered    DTAP (<7y) 03/21/1997    HPV 04/09/2009, 02/08/2010, 01/14/2016    Hepatitis B (Peds <19Y) 03/21/1997    Historic Hib Hib-titer 03/21/1997    Influenza (IIV3) PF 11/14/2006, 10/18/2007, 11/03/2008, 09/30/2016    Influenza Vaccine >6 months (Alfuria,Fluzone) 10/17/2017, 10/22/2018, 12/06/2019, 11/03/2020, 09/23/2021, 11/03/2022    Meningococcal ACWY (Menactra ) 04/09/2009    OPV, trivalent, live 03/21/1997    TDAP (Adacel,Boostrix) 11/03/2020    TDAP Vaccine (Boostrix) 04/09/2009    Varicella 04/09/2009, 02/08/2010         Review Of Systems    Constitutional: no fevers, chills, night sweats or unintentional weight change   Eyes: no vision change, diplopia or red eyes   Ears, Nose, Mouth, Throat: no tinnitus or hearing change, no epistaxis or nasal discharge, no oral lesions, throat clear   Cardiovascular: no chest pain, palpitations, or pain with walking, no orthopnea or PND   Respiratory: no dyspnea, cough, shortness of breath or wheezing   GI: no nausea, vomiting, diarrhea or constipation, no abdominal pain   : no change in urine, no dysuria or hematuria, no sexual dysfunction   Musculoskeletal: no joint or muscle pain or swelling   Integumentary: no concerning lesions or moles   Neuro: no loss of strength or sensation, no numbness or tingling, no tremor, no dizziness, no headache   Endo: no polyuria or polydipsia, no temperature intolerance   Psych: no depression or anxiety, no sleep problems      OBJECTIVE:    /74 (BP Location: Right arm, Patient Position: Sitting, Cuff Size: Adult Regular)   Pulse 89   Ht 1.557 m (5' 1.3\")   Wt 62.1 kg (137 lb)   LMP 08/14/2023 (Approximate)   SpO2 98%   BMI 25.63 kg/m     Wt Readings from Last 1 Encounters:   09/08/23 62.1 kg (137 lb)       Constitutional: no distress, comfortable, pleasant   Eyes: anicteric, normal extra-ocular movements   Ears, Nose and Throat: tympanic membranes clear, nose clear and free of lesions, throat clear, neck supple " with full range of motion, no thyromegaly.   Cardiovascular: regular rate and rhythm, normal S1 and S2, no murmurs, rubs or gallops, peripheral pulses full and symmetric   Respiratory: clear to auscultation, no wheezes or crackles, normal breath sounds   Gastrointestinal: positive bowel sounds, nontender, no hepatosplenomegaly, no masses   Genitourinary: normal external genitalia,  no enlargement of the [place] or [place] glands, urethra normal, perineum normal and free of lesions, cervix normal without lesions, no masses or cervical motion tenderness, adnexa without enlargement or lesions  Musculoskeletal: full range of motion, no edema   Skin: no concerning lesions, no jaundice, subcutaneous sub-centimeter nodular lesion medial right thigh  Breast: no lumps, no nipple discharge  Neurological: cranial nerves intact, normal strength and sensation, normal gait, no tremor   Psychological: appropriate mood   Lymphatic: no cervical lymphadenopathy    ASSESSMENT/PLAN:  Pt is a 28 year old female here for preventive examination    1. Routine history and physical examination of adult  Encouraged pt to work on healthy lifestyle with regular physical activity, nutritious diet focusing on lean proteins and vegetable/fruit intake, stress management and safety.    2. Attention deficit hyperactivity disorder (ADHD), combined type  Records reviewed; she has just started taking classes again at [employer]. Will restart Adderall 10 mg BID, previously tolerated this well.   - amphetamine-dextroamphetamine (ADDERALL) 10 MG tablet; Take 1 tablet (10 mg) by mouth 2 times daily  Dispense: 60 tablet; Refill: 0  - amphetamine-dextroamphetamine (ADDERALL) 10 MG tablet; Take 1 tablet (10 mg) by mouth 2 times daily  Dispense: 60 tablet; Refill: 0  - amphetamine-dextroamphetamine (ADDERALL) 10 MG tablet; Take 1 tablet (10 mg) by mouth 2 times daily  Dispense: 60 tablet; Refill: 0    3. Screening for cervical cancer  - Pap screen reflex to  HPV if ASCUS - recommend age 25 - 29    4. Routine screening for STI (sexually transmitted infection)  Encouraged 100% condom use.  - NEISSERIA GONORRHOEA PCR  - CHLAMYDIA TRACHOMATIS PCR    5. Skin lesion  Nodular skin lesion on upper right thigh, present x6 months. No sign of infection. Recommend applying heat packs and see Dermatology.   - Adult Dermatology Referral; Future    FOLLOW UP: Return in about 3 months (around 12/8/2023).    GYN TESTING:  Breast examination performed.Yes   Pelvic examination performed Yes    Pap   Yes  If normal PAP results pap every 3 years.  STD testing Yes  Chlamydia and Gonorrhea    PREVENTATIVE TESTING:  Breast cancer screening  not indicated   Colorectal screening not indicated    Osteoporosis screening not indicated.  Immunizations reviewed    Labs ordered PAP, Chlamydia, and Gonorrhea  Counseling was provided in the following areas:  regular exercise  healthy diet/nutrition  contraception  safe sex practices/STD prevention  self breast exam    JAMES Wood CNP

## 2023-09-09 LAB
C TRACH DNA SPEC QL NAA+PROBE: NEGATIVE
N GONORRHOEA DNA SPEC QL NAA+PROBE: NEGATIVE

## 2023-09-13 ENCOUNTER — TELEPHONE (OUTPATIENT)
Dept: INTERNAL MEDICINE | Facility: CLINIC | Age: 28
End: 2023-09-13
Payer: COMMERCIAL

## 2023-09-14 LAB
BKR LAB AP GYN ADEQUACY: NORMAL
BKR LAB AP GYN INTERPRETATION: NORMAL
BKR LAB AP HPV REFLEX: NORMAL
BKR LAB AP LMP: NORMAL
BKR LAB AP PREVIOUS ABNORMAL: NORMAL
PATH REPORT.COMMENTS IMP SPEC: NORMAL
PATH REPORT.COMMENTS IMP SPEC: NORMAL
PATH REPORT.RELEVANT HX SPEC: NORMAL

## 2023-09-20 ENCOUNTER — TELEPHONE (OUTPATIENT)
Dept: INTERNAL MEDICINE | Facility: CLINIC | Age: 28
End: 2023-09-20
Payer: COMMERCIAL

## 2023-11-08 ENCOUNTER — OFFICE VISIT (OUTPATIENT)
Dept: FAMILY MEDICINE | Facility: CLINIC | Age: 28
End: 2023-11-08
Payer: COMMERCIAL

## 2023-11-08 VITALS
HEIGHT: 62 IN | SYSTOLIC BLOOD PRESSURE: 109 MMHG | WEIGHT: 134 LBS | HEART RATE: 81 BPM | OXYGEN SATURATION: 96 % | BODY MASS INDEX: 24.66 KG/M2 | RESPIRATION RATE: 16 BRPM | TEMPERATURE: 98.3 F | DIASTOLIC BLOOD PRESSURE: 68 MMHG

## 2023-11-08 DIAGNOSIS — Z30.09 COUNSELING FOR BIRTH CONTROL, ORAL CONTRACEPTIVES: Primary | ICD-10-CM

## 2023-11-08 LAB — HCG UR QL: NEGATIVE

## 2023-11-08 PROCEDURE — 81025 URINE PREGNANCY TEST: CPT

## 2023-11-08 PROCEDURE — 99215 OFFICE O/P EST HI 40 MIN: CPT

## 2023-11-08 RX ORDER — DROSPIRENONE AND ETHINYL ESTRADIOL 0.02-3(28)
1 KIT ORAL DAILY
Qty: 84 TABLET | Refills: 1 | Status: SHIPPED | OUTPATIENT
Start: 2023-11-08 | End: 2024-04-24

## 2023-11-08 NOTE — PROGRESS NOTES
Assessment & Plan     (Z30.09) Counseling for birth control, oral contraceptives  (primary encounter diagnosis)  Comment: Oral contraceptive is the best form of birth control for this patient at this time based on goals of treatment, and comprehensive education and discussion.  Pregnancy test ordered today.  Patient has had bleeding within the last 7 days and denies any unprotected sex in the meantime, but patient does have a history of irregular periods, so ruling out pregnancy via an hCG urine test is justifiable.  Patient does not have a history of migraine headaches or blood clots which would exclude her from being a good candidate for estrogen containing contraceptives.  Plan: drospirenone-ethinyl estradiol (FREDDIE) 3-0.02 MG         tablet, HCG qualitative urine        -begin taking this medication as a Sunday start barring a negative pregnancy test  -Can use short NSAID burst of 600 mg of ibuprofen 3 times a day for 5 days if breakthrough bleeding presents  -Comprehensive counseling and education given regarding birth control options, side effects, and proper use of oral contraceptive.  -Keep an eye out for bothersome side effects, and follow-up in 3 months if side effects do not resolve or if you would like to discuss a different form of birth control.         Patient Instructions   I have prescribed you an oral contraceptive called Freddie.  This medication combines both estrogen and progesterone. With your major goal being pregnancy prevention and agility in your cycle, this will be a good option for you.    As we discussed, since you last blood within a week, you can begin taking your oral contraceptive this coming Sunday.  We will confirm that you are not pregnant today with a urine pregnancy test.    Breakthrough bleeding may be common as your body gets used to the changes in hormones.  This is not something to be worried about, and may take up to 3 months to regulate.  Continue to take your oral  "contraceptive as usual throughout this breakthrough bleeding.  You can try to manage some of the breakthrough bleeding with a short stint of ibuprofen.  Can take 600mg of ibuprofen 3x/day for 5 days at the start of your breakthrough bleeding, and this can sometimes help to resolve this concern.    Will be important to take your oral contraceptive around the same time every day.  If you miss a pill, be sure to take it as soon as you remember, and begin taking your next pill at your normal day and time.  If you miss more than 2 pills, take for most recent 2 days, and use a backup form of contraception for the next 7 days.    As we discussed today, the way your body responds to this medication will be unique to you, but some side effects that you may look out for include mood swings, weight fluctuations, or acne.  Many of the side effects will go away once your body gets used to the changes in hormones, so I would encourage you to take this consistently for 3 months before deciding if it does not work for you.     JAMES Campbell [facility]   Donald is a 28 year old, presenting for the following health issues:  office visit and Recheck Medication (Pt re[ports that she is here for birth control options)        4/7/2023     9:19 AM   Additional Questions   Roomed by Brenda Narayanan   Accompanied by N/A   DALE  TODD is a 28-year-old female with Charcot Marysol tooth disease, ADHD, and anxiety who presents today with a desire to discuss contraceptive counseling.  Patient notes that she has tried a number of contraceptives in the past including a Mirena and to oral contraceptives including Ortho Tri-Cyclen and Nanci.  Patient reports that she has had \"bad luck in the past with other forms of contraceptives, and has not taken any form of birth control since stopping her Nanci in November 2022.  Patient reports that her periods are very irregular, and after discussing this irregularity " with her gynecologist during her last Pap smear, this provider recommended going back on her oral contraceptive to regulate her periods better.  Patient notes that her regular cycle is extended and generally around 45 days with her menstrual phase being only 1 day of bleeding.  She describes her period as 1 day of heavy bleeding and then very light if any bleeding for a few days after.  She attests to mild cramping and breast tenderness prior to her period starting as well as food cravings, but denies any significant mood swings surrounding her cycle.  Patient notes that her periods used to be 3 to 4 days in length, but periods have been much more sparse since she had the Mirena IUD.  Patient denies any recent changes in weight, changes in physical activity habits, or changes in diet.  She denies taking any new medications or supplements.  The first day the patient's last menstrual period was November 6.  Denies having unprotected sex since her last bleed.    Goals of contraception  Patient notes that her main goal when choosing contraception as pregnancy prevention.  She has more recently become sexually active with a sperm producing partner.  Her second major goal of contraception is cycle regularity.  Her major concerns with contraceptions are that she does not want to gain weight, she does not want to have significant mood swings, and she does not want her face to breakout.  Mood swings and acne are to major side effects that caused her to stop birth control options in the past.  Patient is not interested in an inserted option, and does not have interest in birth control through IUD placement.  Patient does note that her goal for family planning is to have children someday, but not in the next couple of years.  Patient denies any history of migraines or blood clots that would prevent her from being eligible for an estrogen-containing contraceptive.  Patient denies concern with taking medication daily.  She does  "not have any concern for pregnancy or sexually transmitted infection at this time.  Patient does note that with the irregularity of her periods, she has been confused in the past about proper oral contraceptive dosing and use.  She was unsure if she was supposed to transition to the sugar pills as soon as she began bleeding, or if she was supposed to continue with hormone pills throughout breakthrough bleeding.      History of Present Illness       Reason for visit:  Birth control    She eats 0-1 servings of fruits and vegetables daily.She consumes 0 sweetened beverage(s) daily.She exercises with enough effort to increase her heart rate 60 or more minutes per day.  She exercises with enough effort to increase her heart rate 5 days per week.   She is taking medications regularly.       Pt reports that she is here for birth control      Review of Systems   Constitutional, HEENT, cardiovascular, pulmonary, gi and gu systems are negative, except as otherwise noted.      Objective    /68 (BP Location: Left arm, Patient Position: Sitting, Cuff Size: Adult Regular)   Pulse 81   Temp 98.3  F (36.8  C) (Tympanic)   Resp 16   Ht 1.575 m (5' 2\")   Wt 60.8 kg (134 lb)   LMP  (LMP Unknown)   SpO2 96%   Breastfeeding No   BMI 24.51 kg/m    Body mass index is 24.51 kg/m .  Physical Exam   GENERAL: healthy, alert and no distress  RESP: lungs clear to auscultation - no rales, rhonchi or wheezes  CV: regular rate and rhythm, normal S1 S2, no S3 or S4, no murmur, click or rub, no peripheral edema and peripheral pulses strong  ABDOMEN: soft, nontender, no hepatosplenomegaly, no masses and bowel sounds normal  MS: no gross musculoskeletal defects noted, no edema    No results found for this or any previous visit (from the past 24 hour(s)).    Zaria Daley, VIJAY FNP-C  Family Nurse Practitioner - Same Day Provider  [facility] [place]                "

## 2023-11-08 NOTE — PATIENT INSTRUCTIONS
I have prescribed you an oral contraceptive called Nanci.  This medication combines both estrogen and progesterone. With your major goal being pregnancy prevention and regulating your cycle, this will be a good option for you.    As we discussed, since you last bled within a week, you can begin taking your oral contraceptive this coming Sunday.  We will confirm that you are not pregnant today with a urine pregnancy test.    Breakthrough bleeding may be common as your body gets used to the changes in hormones.  This is not something to be worried about, and may take up to 3 months to regulate.  Continue to take your oral contraceptive as usual throughout this breakthrough bleeding.  You can try to manage some of the breakthrough bleeding with a short stint of ibuprofen.  Can take 600mg of ibuprofen 3x/day for 5 days at the start of your breakthrough bleeding, and this can sometimes help to resolve this concern.    Will be important to take your oral contraceptive around the same time every day.  If you miss a pill, be sure to take it as soon as you remember, and begin taking your next pill at your normal day and time.  If you miss more than 2 pills, take for most recent 2 days, and use a backup form of contraception for the next 7 days.    As we discussed today, the way your body responds to this medication will be unique to you, but some side effects that you may look out for include mood swings, weight fluctuations, or acne.  Many of the side effects will go away once your body gets used to the changes in hormones, so I would encourage you to take this consistently for 3 months before deciding if it does not work for you.    Hydroxychloroquine Counseling:  I discussed with the patient that a baseline ophthalmologic exam is needed at the start of therapy and every year thereafter while on therapy. A CBC may also be warranted for monitoring.  The side effects of this medication were discussed with the patient, including but not limited to agranulocytosis, aplastic anemia, seizures, rashes, retinopathy, and liver toxicity. Patient instructed to call the office should any adverse effect occur.  The patient verbalized understanding of the proper use and possible adverse effects of Plaquenil.  All the patient's questions and concerns were addressed.

## 2023-11-10 DIAGNOSIS — Z30.09 COUNSELING FOR BIRTH CONTROL, ORAL CONTRACEPTIVES: ICD-10-CM

## 2023-11-29 DIAGNOSIS — L70.0 ACNE VULGARIS: ICD-10-CM

## 2023-11-29 NOTE — TELEPHONE ENCOUNTER
Routing to provider for approval/denial since med does not meet protocol. Pt has follow up scheduled with Althea in March.    Thank you,  Janina VERA RN  Dermatology   [phone]

## 2023-11-29 NOTE — TELEPHONE ENCOUNTER
Last Written Prescription Date:  11/18/22  Last Fill Quantity: 60 ml,  # refills: 11   Last office visit: 11/18/2022 ; last virtual visit: Visit date not found with prescribing provider:     Future Office Visit:      Requested Prescriptions   Pending Prescriptions Disp Refills    clindamycin (CLEOCIN T) 1 % external lotion 60 mL 11     Sig: Apply topically every morning To all areas of acne       There is no refill protocol information for this order        l

## 2023-11-29 NOTE — TELEPHONE ENCOUNTER
Last Written Prescription Date:  11/18/22  Last Fill Quantity: 45 gm,  # refills: 11   Last office visit: 11/18/2022 ; last virtual visit: Visit date not found with prescribing provider:     Future Office Visit:      Requested Prescriptions   Pending Prescriptions Disp Refills    tretinoin (RETIN-A) 0.025 % external cream 45 g 11     Sig: Use every night to all areas of acne       There is no refill protocol information for this order

## 2023-11-29 NOTE — TELEPHONE ENCOUNTER
Routing to provider for approval/denial since med does not meet protocol. Pt has follow up with Althea in March.    Thank you,  Janina VERA RN  Dermatology   [phone]

## 2023-11-30 RX ORDER — CLINDAMYCIN PHOSPHATE 10 UG/ML
LOTION TOPICAL EVERY MORNING
Qty: 60 ML | Refills: 11 | Status: SHIPPED | OUTPATIENT
Start: 2023-11-30

## 2023-11-30 RX ORDER — TRETINOIN 0.25 MG/G
CREAM TOPICAL
Qty: 45 G | Refills: 11 | Status: SHIPPED | OUTPATIENT
Start: 2023-11-30

## 2023-12-15 ENCOUNTER — OFFICE VISIT (OUTPATIENT)
Dept: INTERNAL MEDICINE | Facility: CLINIC | Age: 28
End: 2023-12-15
Payer: COMMERCIAL

## 2023-12-15 ENCOUNTER — LAB (OUTPATIENT)
Dept: LAB | Facility: CLINIC | Age: 28
End: 2023-12-15
Payer: COMMERCIAL

## 2023-12-15 VITALS
SYSTOLIC BLOOD PRESSURE: 119 MMHG | HEIGHT: 62 IN | WEIGHT: 136.4 LBS | HEART RATE: 90 BPM | OXYGEN SATURATION: 98 % | DIASTOLIC BLOOD PRESSURE: 84 MMHG | BODY MASS INDEX: 25.1 KG/M2

## 2023-12-15 DIAGNOSIS — R42 VERTIGO: Primary | ICD-10-CM

## 2023-12-15 DIAGNOSIS — R42 VERTIGO: ICD-10-CM

## 2023-12-15 DIAGNOSIS — J30.2 SEASONAL ALLERGIC RHINITIS, UNSPECIFIED TRIGGER: ICD-10-CM

## 2023-12-15 LAB
ALBUMIN SERPL BCG-MCNC: 4.3 G/DL (ref 3.5–5.2)
ALP SERPL-CCNC: 61 U/L (ref 40–150)
ALT SERPL W P-5'-P-CCNC: 9 U/L (ref 0–50)
ANION GAP SERPL CALCULATED.3IONS-SCNC: 8 MMOL/L (ref 7–15)
AST SERPL W P-5'-P-CCNC: 17 U/L (ref 0–45)
BASOPHILS # BLD AUTO: 0 10E3/UL (ref 0–0.2)
BASOPHILS NFR BLD AUTO: 0 %
BILIRUB SERPL-MCNC: 0.3 MG/DL
BUN SERPL-MCNC: 12.4 MG/DL (ref 6–20)
CALCIUM SERPL-MCNC: 9.7 MG/DL (ref 8.6–10)
CHLORIDE SERPL-SCNC: 105 MMOL/L (ref 98–107)
CREAT SERPL-MCNC: 0.69 MG/DL (ref 0.51–0.95)
DEPRECATED HCO3 PLAS-SCNC: 26 MMOL/L (ref 22–29)
EGFRCR SERPLBLD CKD-EPI 2021: >90 ML/MIN/1.73M2
EOSINOPHIL # BLD AUTO: 0.1 10E3/UL (ref 0–0.7)
EOSINOPHIL NFR BLD AUTO: 2 %
ERYTHROCYTE [DISTWIDTH] IN BLOOD BY AUTOMATED COUNT: 12.1 % (ref 10–15)
GLUCOSE SERPL-MCNC: 92 MG/DL (ref 70–99)
HCT VFR BLD AUTO: 36.8 % (ref 35–47)
HGB BLD-MCNC: 12.4 G/DL (ref 11.7–15.7)
IMM GRANULOCYTES # BLD: 0 10E3/UL
IMM GRANULOCYTES NFR BLD: 0 %
LYMPHOCYTES # BLD AUTO: 2.4 10E3/UL (ref 0.8–5.3)
LYMPHOCYTES NFR BLD AUTO: 41 %
MCH RBC QN AUTO: 30.8 PG (ref 26.5–33)
MCHC RBC AUTO-ENTMCNC: 33.7 G/DL (ref 31.5–36.5)
MCV RBC AUTO: 92 FL (ref 78–100)
MONOCYTES # BLD AUTO: 0.5 10E3/UL (ref 0–1.3)
MONOCYTES NFR BLD AUTO: 8 %
NEUTROPHILS # BLD AUTO: 2.8 10E3/UL (ref 1.6–8.3)
NEUTROPHILS NFR BLD AUTO: 49 %
NRBC # BLD AUTO: 0 10E3/UL
NRBC BLD AUTO-RTO: 0 /100
PLATELET # BLD AUTO: 242 10E3/UL (ref 150–450)
POTASSIUM SERPL-SCNC: 4.4 MMOL/L (ref 3.4–5.3)
PROT SERPL-MCNC: 8.1 G/DL (ref 6.4–8.3)
RBC # BLD AUTO: 4.02 10E6/UL (ref 3.8–5.2)
SODIUM SERPL-SCNC: 139 MMOL/L (ref 135–145)
WBC # BLD AUTO: 5.8 10E3/UL (ref 4–11)

## 2023-12-15 PROCEDURE — 85025 COMPLETE CBC W/AUTO DIFF WBC: CPT | Performed by: PATHOLOGY

## 2023-12-15 PROCEDURE — 80053 COMPREHEN METABOLIC PANEL: CPT | Performed by: PATHOLOGY

## 2023-12-15 PROCEDURE — 36415 COLL VENOUS BLD VENIPUNCTURE: CPT | Performed by: PATHOLOGY

## 2023-12-15 PROCEDURE — 99214 OFFICE O/P EST MOD 30 MIN: CPT | Performed by: NURSE PRACTITIONER

## 2023-12-15 RX ORDER — MECLIZINE HYDROCHLORIDE 25 MG/1
12.5-25 TABLET ORAL 3 TIMES DAILY PRN
Qty: 60 TABLET | Refills: 1 | Status: SHIPPED | OUTPATIENT
Start: 2023-12-15

## 2023-12-15 RX ORDER — FLUTICASONE PROPIONATE 50 MCG
1 SPRAY, SUSPENSION (ML) NASAL DAILY
Qty: 15.8 ML | Refills: 1 | Status: SHIPPED | OUTPATIENT
Start: 2023-12-15

## 2023-12-15 NOTE — PROGRESS NOTES
Donald is a 28 year old that presents in clinic today for the following:     Chief Complaint   Patient presents with    Follow Up     Talk about dizziness started June 2022, now everyday less intermittent           12/15/2023     8:27 AM   Additional Questions   Roomed by SK EMT       Screenings from encounters over the past 10 days    No data recorded       Kaylene Mac MA at 8:29 AM on 12/15/2023      Assessment & Plan     Seasonal allergic rhinitis, unspecified trigger  Recommend using Fluticasone on a more regular basis to help reduce post-nasal drip and sensation of phlegm in throat. Refill provided.  - fluticasone (FLONASE) 50 MCG/ACT nasal spray; Spray 1 spray into both nostrils daily    Vertigo  Persistent room-spinning dizziness over last 3 months, will check CMP and CBC to ensure no electrolyte derangements or anemia.  However, symptoms more consistent with BPPV, given that sensation of dizziness is worsened by position changes such as turning head or looking up/back.  Can use meclizine to help reduce symptoms, reviewed potential side effects. Continue working on staying well-hydrated, healthy meals. Reviewed Epley maneuver to try at home, and will refer to vestibular PT/OT for ongoing assessment and treatment.  Requested that she notify us if symptoms worsening or not improving over the next several months.  Can re-refer to neurology if persisting, given hx CMT.  - meclizine (ANTIVERT) 25 MG tablet; Take 0.5-1 tablets (12.5-25 mg) by mouth 3 times daily as needed for dizziness  - Physical Therapy Referral; Future  - Comprehensive metabolic panel (BMP + Alb, Alk Phos, ALT, AST, Total. Bili, TP); Future  - CBC with platelets and differential; Future      31 minutes spent by me on the date of the encounter doing chart review, history and exam, documentation and further activities per the note       Return if symptoms worsen or fail to improve.    JAMES Wood [facility]  "INTERNAL MEDICINE [place]    Flynn Bennett is a 28 year old, presenting for the following health issues:  Follow Up (Talk about dizziness started June 2022, now everyday less intermittent)        12/15/2023     8:27 AM   Additional Questions   Roomed by SK EMT       HPI       Dizziness--first noticed when drinking at a wedding in June 2022, and was severe at the time, but then continued to occur about 1x per month after that (not associated with any alcohol use).  Since September 2023, dizziness is persistent on a daily basis, throughout the day, worsens if turning her head or looking back/tipping chin up.  It is less severe than it was with the initial onset in 2022 but is concerning.  Feels like she's \"looking over a aura and feels dizzy looking down.\"  No N/V, no falls, weakness, or new/worsening numbness/tingling.   No SOB, CP, syncope, or palpitations.  No other change in symptoms.   Walk-in clinic in May for a bad cough, gave an inhaler, told to take Mucinex. But phlegm never fully resolves. Mucinex didn't really help. Can cough it up but always returns. Feels building up behidn the nose.  +nasal congestion.  Has a flonase nasal spray doesn't like to use it.   Has been staying well-hydrated.  Mother recommended a multivitamin and also using liquid IV. Eating healthy.   Weight has been stable.   She exercises regularly and feels good with this, no exacerbation in dizziness with exercise.   No ear pain or pressure.Ear wax is dry.   No flights in past year.   Feels a mild \"pressure\" in the head at times, not pain or headaches.  Started Nanci in Nov but symptoms present prior.    Review of Systems   Constitutional, HEENT, cardiovascular, pulmonary, gi and gu systems are negative, except as otherwise noted.      Objective    /84 (BP Location: Right arm, Patient Position: Sitting, Cuff Size: Adult Regular)   Pulse 90   Ht 1.575 m (5' 2\")   Wt 61.9 kg (136 lb 6.4 oz)   LMP  (LMP Unknown)   SpO2 " 98%   BMI 24.95 kg/m    Body mass index is 24.95 kg/m .  Physical Exam   GENERAL: healthy, alert and no distress  EYES: Eyes grossly normal to inspection, and conjunctivae and sclerae normal  HENT: ear canals and TM's normal, nose and mouth without ulcers or lesions, endorses worsening dizziness when lying supine and turning head from side to side  NECK: no adenopathy, no asymmetry, masses, or scars and thyroid normal to palpation  RESP: lungs clear to auscultation - no rales, rhonchi or wheezes  CV: regular rate and rhythm, normal S1 S2, no S3 or S4, no murmur, click or rub, no peripheral edema and peripheral pulses strong  ABDOMEN: soft, nontender, no hepatosplenomegaly, no masses and bowel sounds normal  MS: no gross musculoskeletal defects noted, no edema  NEURO: Cranial nerves grossly intact, normal strength and tone, sensory exam grossly normal, mentation intact, speech normal, and steppage gait at baseline  PSYCH: mentation appears normal, affect normal/bright

## 2024-01-04 DIAGNOSIS — F90.2 ATTENTION DEFICIT HYPERACTIVITY DISORDER (ADHD), COMBINED TYPE: ICD-10-CM

## 2024-01-04 RX ORDER — DEXTROAMPHETAMINE SACCHARATE, AMPHETAMINE ASPARTATE, DEXTROAMPHETAMINE SULFATE AND AMPHETAMINE SULFATE 2.5; 2.5; 2.5; 2.5 MG/1; MG/1; MG/1; MG/1
10 TABLET ORAL 2 TIMES DAILY
Qty: 60 TABLET | Refills: 0 | Status: SHIPPED | OUTPATIENT
Start: 2024-01-04 | End: 2024-02-07

## 2024-01-04 NOTE — TELEPHONE ENCOUNTER
Amphetamine-Dextroamphetamine Oral Tablet 10 MG   Last Written Prescription Date:  11/7/23  Last Fill Quantity: 60,   # refills: 0  Last Office Visit : 12/15/23  Future Office visit:  none  Routing refill request to provider for review/approval because:  Controlled substance

## 2024-03-18 ENCOUNTER — OFFICE VISIT (OUTPATIENT)
Dept: FAMILY MEDICINE | Facility: CLINIC | Age: 29
End: 2024-03-18
Payer: COMMERCIAL

## 2024-03-18 ENCOUNTER — ANCILLARY PROCEDURE (OUTPATIENT)
Dept: GENERAL RADIOLOGY | Facility: CLINIC | Age: 29
End: 2024-03-18
Attending: PHYSICIAN ASSISTANT
Payer: COMMERCIAL

## 2024-03-18 VITALS
RESPIRATION RATE: 23 BRPM | WEIGHT: 137.4 LBS | HEART RATE: 90 BPM | HEIGHT: 61 IN | SYSTOLIC BLOOD PRESSURE: 118 MMHG | DIASTOLIC BLOOD PRESSURE: 74 MMHG | BODY MASS INDEX: 25.94 KG/M2 | TEMPERATURE: 98.2 F | OXYGEN SATURATION: 97 %

## 2024-03-18 DIAGNOSIS — R05.1 ACUTE COUGH: ICD-10-CM

## 2024-03-18 DIAGNOSIS — J06.9 URI WITH COUGH AND CONGESTION: Primary | ICD-10-CM

## 2024-03-18 DIAGNOSIS — L55.1 SUNBURN, SECOND DEGREE: ICD-10-CM

## 2024-03-18 PROCEDURE — 99204 OFFICE O/P NEW MOD 45 MIN: CPT | Performed by: PHYSICIAN ASSISTANT

## 2024-03-18 PROCEDURE — 71046 X-RAY EXAM CHEST 2 VIEWS: CPT | Mod: TC | Performed by: RADIOLOGY

## 2024-03-18 RX ORDER — CODEINE PHOSPHATE AND GUAIFENESIN 10; 100 MG/5ML; MG/5ML
1-2 SOLUTION ORAL EVERY 4 HOURS PRN
Qty: 118 ML | Refills: 0 | Status: SHIPPED | OUTPATIENT
Start: 2024-03-18 | End: 2024-03-22

## 2024-03-18 ASSESSMENT — ENCOUNTER SYMPTOMS: COUGH: 1

## 2024-03-18 NOTE — LETTER
March 18, 2024      Donald Duarte  [address]   [place] [address]        To Whom It May Concern:    Donald Duarte  was seen on 3/18/24.  Please excuse her from work on 3/15/24 and today 3/18/24  due to illness.        Sincerely,        DAVID Duran

## 2024-03-18 NOTE — PROGRESS NOTES
"  Assessment & Plan     URI with cough and congestion  Chest xray was normal. Lungs and oxygen today was normal. I have low suspicion for pneumonia or bacterial related infection and don't recommend antibiotics at this time. Discussed with patient this is likely a viral infection. Get plenty of rest. Make sure you get plenty of fluids as well. Humidifier in the bedroom, tea with honey, and Mucinex can help with cough and congestion.  Prescription for cough suppressant with codeine given to patient to help with sleep and cough at night. Side effects discussed. Symptoms should improve over the next 3-4 days. Follow up in clinic if symptoms do not improve or worsen. Patient agree's with this plan and has no further questions  - guaiFENesin-codeine (ROBITUSSIN AC) 100-10 MG/5ML solution; Take 5-10 mLs by mouth every 4 hours as needed for cough    Acute cough  - XR Chest 2 Views; Future    Sunburn, second degree  Appears to be healing well on exam today. No sign of infection, Encouraged Cool compresses or soaks, calamine lotion, or aloe vera-based gels provide some relief of pain and discomfort.              BMI  Estimated body mass index is 25.94 kg/m  as calculated from the following:    Height as of this encounter: 1.55 m (5' 1.02\").    Weight as of this encounter: 62.3 kg (137 lb 6.4 oz).         Flynn Bennett is a 28 year old, presenting for the following health issues:  Cough and Rash        3/18/2024     8:00 AM   Additional Questions   Roomed by manuela park ma     History of Present Illness       Reason for visit:  Cough  Symptom onset:  3-7 days ago    She eats 2-3 servings of fruits and vegetables daily.She consumes 0 sweetened beverage(s) daily.She exercises with enough effort to increase her heart rate 30 to 60 minutes per day.  She exercises with enough effort to increase her heart rate 4 days per week.   She is taking medications regularly.       Acute Illness  Acute illness concerns: " "cough  Onset/Duration: 3/10/24   Symptoms:  Fever: YES- highest was 100.5 oral  Chills/Sweats: No  Headache (location?): No  Sinus Pressure: YES  Conjunctivitis:  No  Ear Pain: no  Rhinorrhea: YES  Congestion: YES - yellowish greenish  Sore Throat: YES- a little but could be from the cough  Cough: YES-productive of green/yellow sputum  Wheeze: YES - with coughing  Decreased Appetite: YES  Nausea: YES- a little from the cough  Vomiting: No  Diarrhea: No  Dysuria/Freq.: No  Dysuria or Hematuria: No  Fatigue/Achiness: YES- had a little bit of body aches last week  Sick/Strep Exposure: No  Therapies tried and outcome: Delsym, Dayquil, OTC cold/cough; not effective  Was traveling to [place]  Has not test for COVID    Rash  Onset/Duration: Tuesday last week  Description  Location: Chest  Character: blotchy, red  Itching: mild  Intensity:  mild  Progression of Symptoms:  thinks it's improving but looks worse  Accompanying signs and symptoms:   Fever: YES  Body aches or joint pain: No  Sore throat symptoms: YES  Recent cold symptoms: YES  History:           Previous episodes of similar rash: None  New exposures:  None and Was in [place] and rash is from a sunburn  Recent travel: YES  Exposure to similar rash: No  Precipitating or alleviating factors: None  Therapies tried and outcome: Aloe; slightly effective. Cefdafil cream since it looks dry            Review of Systems  Constitutional, HEENT, cardiovascular, pulmonary, gi and gu systems are negative, except as otherwise noted.      Objective    /74 (BP Location: Right arm, Patient Position: Chair, Cuff Size: Adult Regular)   Pulse 90   Temp 98.2  F (36.8  C) (Oral)   Resp 23   Ht 1.55 m (5' 1.02\")   Wt 62.3 kg (137 lb 6.4 oz)   SpO2 97%   BMI 25.94 kg/m    Body mass index is 25.94 kg/m .  Physical Exam  Constitutional:       Appearance: Normal appearance.   HENT:      Right Ear: Tympanic membrane and ear canal normal.      Left Ear: Tympanic membrane and ear " canal normal.      Nose: Congestion and rhinorrhea present.      Right Sinus: No maxillary sinus tenderness or frontal sinus tenderness.      Left Sinus: No maxillary sinus tenderness or frontal sinus tenderness.      Mouth/Throat:      Mouth: Mucous membranes are moist.      Pharynx: Oropharynx is clear. Posterior oropharyngeal erythema present. No pharyngeal swelling or uvula swelling.   Cardiovascular:      Rate and Rhythm: Normal rate and regular rhythm.   Pulmonary:      Effort: Pulmonary effort is normal.      Breath sounds: Normal breath sounds. No decreased air movement.   Lymphadenopathy:      Cervical: No cervical adenopathy.   Skin:     Comments: Across chest has sunburn with redness and a few open blisters with skin shedding   Neurological:      Mental Status: She is alert.   Psychiatric:         Mood and Affect: Mood normal.         Behavior: Behavior normal.            CXR - Reviewed and interpreted by me Normal- no infiltrates, effusions, pneumothoraces, cardiomegaly or masses        Signed Electronically by: DAVID Duran

## 2024-03-22 DIAGNOSIS — J06.9 URI WITH COUGH AND CONGESTION: ICD-10-CM

## 2024-03-22 RX ORDER — CODEINE PHOSPHATE AND GUAIFENESIN 10; 100 MG/5ML; MG/5ML
1-2 SOLUTION ORAL EVERY 4 HOURS PRN
Qty: 118 ML | Refills: 0 | Status: SHIPPED | OUTPATIENT
Start: 2024-03-22

## 2024-03-22 NOTE — TELEPHONE ENCOUNTER
Note reviewed from visit 3/18/24.  Will refill Robitussin-AC x1, if cough persists recommend clinic follow-up for repeat evaluation and further management.  JAMES Wood CNP

## 2024-03-25 ENCOUNTER — MYC REFILL (OUTPATIENT)
Dept: FAMILY MEDICINE | Facility: CLINIC | Age: 29
End: 2024-03-25
Payer: COMMERCIAL

## 2024-03-25 DIAGNOSIS — J06.9 URI WITH COUGH AND CONGESTION: ICD-10-CM

## 2024-03-25 RX ORDER — CODEINE PHOSPHATE AND GUAIFENESIN 10; 100 MG/5ML; MG/5ML
1-2 SOLUTION ORAL EVERY 4 HOURS PRN
Qty: 118 ML | Refills: 0 | Status: CANCELLED | OUTPATIENT
Start: 2024-03-25

## 2024-04-22 DIAGNOSIS — Z30.09 COUNSELING FOR BIRTH CONTROL, ORAL CONTRACEPTIVES: ICD-10-CM

## 2024-04-25 RX ORDER — DROSPIRENONE AND ETHINYL ESTRADIOL 0.02-3(28)
1 KIT ORAL DAILY
Qty: 84 TABLET | Refills: 2 | Status: SHIPPED | OUTPATIENT
Start: 2024-04-25 | End: 2024-06-12

## 2024-04-25 NOTE — TELEPHONE ENCOUNTER
Patient called in regarding status, she needs ASAP, please fill.     [facility] in [facility]

## 2024-04-25 NOTE — TELEPHONE ENCOUNTER
drospirenone-ethinyl estradiol (FREDDIE) 3-0.02 MG tablet 84 tablet 1 11/8/2023       Last Office Visit: 12/15/23  Future Office visit:   none  Refill protocol passed    Heather Ayala RN  P Red Flag Triage/MRT

## 2024-05-15 DIAGNOSIS — F90.2 ATTENTION DEFICIT HYPERACTIVITY DISORDER (ADHD), COMBINED TYPE: Primary | ICD-10-CM

## 2024-05-15 NOTE — TELEPHONE ENCOUNTER
Patient calling stating she moved and wants this medication to go [place] in [place]. Please advise.

## 2024-05-15 NOTE — TELEPHONE ENCOUNTER
[place]  Data    Adderall refill request. LOV 12/15/2023.  Last filled - 60 tab - 30 day supply - on 4/3/2024.  Pended Rx - 60 tab - 30 day supply - no delay in fill date.  Refill request forwarded to provider.  Kenyatta ENCINAS LPN  [facility]

## 2024-05-17 RX ORDER — DEXTROAMPHETAMINE SACCHARATE, AMPHETAMINE ASPARTATE, DEXTROAMPHETAMINE SULFATE AND AMPHETAMINE SULFATE 2.5; 2.5; 2.5; 2.5 MG/1; MG/1; MG/1; MG/1
10 TABLET ORAL 2 TIMES DAILY
Qty: 60 TABLET | Refills: 0 | Status: SHIPPED | OUTPATIENT
Start: 2024-05-17

## 2024-06-12 ENCOUNTER — OFFICE VISIT (OUTPATIENT)
Dept: FAMILY MEDICINE | Facility: CLINIC | Age: 29
End: 2024-06-12
Payer: COMMERCIAL

## 2024-06-12 VITALS
OXYGEN SATURATION: 98 % | RESPIRATION RATE: 12 BRPM | WEIGHT: 137.5 LBS | HEIGHT: 62 IN | HEART RATE: 99 BPM | BODY MASS INDEX: 25.3 KG/M2 | DIASTOLIC BLOOD PRESSURE: 68 MMHG | SYSTOLIC BLOOD PRESSURE: 100 MMHG | TEMPERATURE: 98.3 F

## 2024-06-12 DIAGNOSIS — Z30.09 COUNSELING FOR BIRTH CONTROL, ORAL CONTRACEPTIVES: ICD-10-CM

## 2024-06-12 DIAGNOSIS — G60.0 CHARCOT-MARIE-TOOTH DISEASE: Primary | ICD-10-CM

## 2024-06-12 DIAGNOSIS — F90.2 ATTENTION DEFICIT HYPERACTIVITY DISORDER (ADHD), COMBINED TYPE: ICD-10-CM

## 2024-06-12 PROCEDURE — 99214 OFFICE O/P EST MOD 30 MIN: CPT | Performed by: FAMILY MEDICINE

## 2024-06-12 PROCEDURE — G2211 COMPLEX E/M VISIT ADD ON: HCPCS | Performed by: FAMILY MEDICINE

## 2024-06-12 RX ORDER — DROSPIRENONE AND ETHINYL ESTRADIOL 0.02-3(28)
1 KIT ORAL DAILY
Qty: 84 TABLET | Refills: 2 | Status: SHIPPED | OUTPATIENT
Start: 2024-06-12

## 2024-06-12 RX ORDER — DEXTROAMPHETAMINE SACCHARATE, AMPHETAMINE ASPARTATE, DEXTROAMPHETAMINE SULFATE AND AMPHETAMINE SULFATE 2.5; 2.5; 2.5; 2.5 MG/1; MG/1; MG/1; MG/1
10 TABLET ORAL 2 TIMES DAILY
Qty: 60 TABLET | Refills: 0 | Status: SHIPPED | OUTPATIENT
Start: 2024-08-12 | End: 2024-09-23

## 2024-06-12 RX ORDER — DEXTROAMPHETAMINE SACCHARATE, AMPHETAMINE ASPARTATE, DEXTROAMPHETAMINE SULFATE AND AMPHETAMINE SULFATE 2.5; 2.5; 2.5; 2.5 MG/1; MG/1; MG/1; MG/1
10 TABLET ORAL 2 TIMES DAILY
Qty: 60 TABLET | Refills: 0 | Status: SHIPPED | OUTPATIENT
Start: 2024-07-12

## 2024-06-12 RX ORDER — DEXTROAMPHETAMINE SACCHARATE, AMPHETAMINE ASPARTATE, DEXTROAMPHETAMINE SULFATE AND AMPHETAMINE SULFATE 2.5; 2.5; 2.5; 2.5 MG/1; MG/1; MG/1; MG/1
10 TABLET ORAL 2 TIMES DAILY
Qty: 60 TABLET | Refills: 0 | Status: SHIPPED | OUTPATIENT
Start: 2024-06-12

## 2024-06-12 NOTE — LETTER
[facility]  06/12/24  Patient: Donald Duarte  YOB: 1995  Medical Record Number: [id number]                                                                                  Non-Opioid Controlled Substance Agreement    This is an agreement between you and your provider regarding safe and appropriate use of controlled substances prescribed by your care team. Controlled substances are?medicines that can cause physical and mental dependence (abuse).     There are strict laws about having and using these medicines. We here at [facility] are  committed to working with you in your efforts to get better. To support you in this work, we'll help you schedule regular office appointments for medicine refills. If we must cancel or change your appointment for any reason, we'll make sure you have enough medicine to last until your next appointment.     As a Provider, I will:   Listen carefully to your concerns while treating you with respect.   Recommend a treatment plan that I believe is in your best interest and may involve therapies other than medicine.    Talk with you often about the possible benefits and the risk of harm of any medicine that we prescribe for you.  Assess the safety of this medicine and check how well it works.    Provide a plan on how to taper (discontinue or go off) using this medicine if the decision is made to stop its use.      ::  As a Patient, I understand controlled substances:     Are prescribed by my care provider to help me function or work and manage my condition(s).?  Are strong medicines and can cause serious side effects.     Need to be taken exactly as prescribed.?Combining controlled substances with certain medicines or chemicals (such as illegal drugs, alcohol, sedatives, sleeping pills, and benzodiazepines) can be dangerous or even fatal.? If I stop taking my medicines suddenly, I may have severe withdrawal symptoms.     The risks, benefits,  and side effects of these medicine(s) were explained to me. I agree that:    I will take part in other treatments as advised by my care team. This may be psychiatry or counseling, physical therapy, behavioral therapy, group treatment or a referral to specialist.    I will keep all my appointments and understand this is part of the monitoring of controlled substances.?My care team may require an office visit for EVERY controlled substance refill. If I miss appointments or don t follow instructions, my care team may stop my medicine    I will take my medicines as prescribed. I will not change the dose or schedule unless my care team tells me to. There will be no refills if I run out early.      I may be asked to come to the clinic and complete a urine drug test or complete a pill count. If I don t give a urine sample or participate in a pill count, the care team may stop my medicine.    I will only receive controlled substance prescriptions from this clinic. If I am treated by another provider, I will tell them that I am taking controlled substances and that I have a treatment agreement with this provider. I will inform my [facility] care team within one business day if I am given a prescription for any controlled substance by another healthcare provider. My [facility] care team can contact other providers and pharmacists about my use of any medicines.    It is up to me to make sure that I don't run out of my medicines on weekends or holidays.?If my care team is willing to refill my prescription without a visit, I must request refills only during office hours. Refills may take up to 3 business days to process. I will use one pharmacy to fill all my controlled substance prescriptions. I will notify the clinic about any changes to my insurance or medicine availability.    I am responsible for my prescriptions. If the medicine/prescription is lost, stolen or destroyed, it will not be replaced.?I also agree  not to share controlled substance medicines with anyone.     I am aware I should not use any illegal or recreational drugs. I agree not to drink alcohol unless my care team says I can.     If I enroll in the [place] Medical Cannabis program, I will tell my care team before my next refill.    I will tell my care team right away if I become pregnant, have a new medical problem treated outside of my regular clinic, or have a change in my medicines.     I understand that this medicine can affect my thinking, judgment and reaction time.? Alcohol and drugs affect the brain and body, which can affect the safety of my driving. Being under the influence of alcohol or drugs can affect my decision-making, behaviors, personal safety and the safety of others. Driving while impaired (DWI) can occur if a person is driving, operating or in physical control of a car, motorcycle, boat, snowmobile, ATV, motorbike, off-road vehicle or any other motor vehicle ([place] Statute 169A.20). I understand the risk if I choose to drive or operate any vehicle or machinery.    I understand that if I do not follow any of the conditions above, my prescriptions or treatment may be stopped or changed.   I agree that my provider, clinic care team and pharmacy may work with any city, state or federal law enforcement agency that investigates the misuse, sale or other diversion of my controlled medicine. I will allow my provider to discuss my care with, or share a copy of, this agreement with any other treating provider, pharmacy or emergency room where I receive care.     I have read this agreement and have asked questions about anything I did not understand.    ________________________________________________________  Patient Signature - Donald Duarte     ___________________                   Date     ________________________________________________________  Provider Signature - Zuleyka Grant MD       ___________________                   Date      ________________________________________________________  Witness Signature (required if provider not present while patient signing)          ___________________                   Date

## 2024-06-12 NOTE — PROGRESS NOTES
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 This would inform her potential future pregnancies.   - Adult Genetics & Metabolism  Referral    Counseling for birth control, oral contraceptives: refilled. Reviewed chart after visit was over and she had previously answered \"no\" to having any migraines or blood clots  - drospirenone-ethinyl estradiol (FREDDIE) 3-0.02 MG tablet  Dispense: 84 tablet; Refill: 2    Attention deficit hyperactivity disorder (ADHD), combined type: has formal ADHD diagnosis. Refilled Adderall and signed CSA.   - amphetamine-dextroamphetamine (ADDERALL) 10 MG tablet  Dispense: 60 tablet; Refill: 0  - amphetamine-dextroamphetamine (ADDERALL) 10 MG tablet  Dispense: 60 tablet; Refill: 0  - amphetamine-dextroamphetamine (ADDERALL) 10 MG tablet  Dispense: 60 tablet; Refill: 0      BMI  Estimated body mass index is 25.47 kg/m  as calculated from the following:    Height as of this encounter: 1.565 m (5' 1.61\").    Weight as of this encounter: 62.4 kg (137 lb 8 oz).       Flynn Bennett is a 28 year old, presenting for the following health issues:  Establish Care and Referral (Neurologist /)      6/12/2024     9:18 AM   Additional Questions   Roomed by ROCAEL Acosta   Accompanied by self     History of Present Illness       Reason for visit:  CMT    She eats 0-1 servings of fruits and vegetables daily.She consumes 0 sweetened beverage(s) daily.She exercises with enough effort to increase her heart rate 20 to 29 minutes per day.  She exercises with enough effort to increase her heart rate 3 or less days per week.   She is taking medications regularly.       Got testing for ADHD through a counseling center during senior year of HS. Has been on Adderall 10 mg BID and it helps with focus. Often takes it just once per day. She was previously told that she had Charcot-Marysol-Tooth disease but she does " "not know what type she has and wonders if she should get genetic testing.  She wants to know if she could pass it onto any future children.  She does not have any numbness or tingling or pain that she attributes to this.    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 Lives with her younger sister.       Objective    /68 (BP Location: Right arm, Patient Position: Sitting, Cuff Size: Adult Regular)   Pulse 99   Temp 98.3  F (36.8  C) (Oral)   Resp 12   Ht 1.565 m (5' 1.61\")   Wt 62.4 kg (137 lb 8 oz)   LMP  (LMP Unknown)   SpO2 98%   BMI 25.47 kg/m    Body mass index is 25.47 kg/m .  Physical Exam   GENERAL: alert and no distress  MS: no gross musculoskeletal defects noted, no edema      The longitudinal plan of care for the diagnosis(es)/condition(s) as documented were addressed during this visit. Due to the added complexity in care, I will continue to support Donald in the subsequent management and with ongoing continuity of care.      Signed Electronically by: Zuleyka Grant MD    "

## 2024-06-17 ENCOUNTER — TELEPHONE (OUTPATIENT)
Dept: CONSULT | Facility: CLINIC | Age: 29
End: 2024-06-17
Payer: COMMERCIAL

## 2024-06-17 NOTE — TELEPHONE ENCOUNTER
LVM for patient to call me back directly to schedule GC only visit with Libia/Yandy/Janessa in Neuro slot.

## 2024-07-02 ENCOUNTER — VIRTUAL VISIT (OUTPATIENT)
Dept: CONSULT | Facility: CLINIC | Age: 29
End: 2024-07-02
Attending: FAMILY MEDICINE
Payer: COMMERCIAL

## 2024-07-02 DIAGNOSIS — G60.0 CHARCOT-MARIE-TOOTH DISEASE: ICD-10-CM

## 2024-07-02 DIAGNOSIS — Z71.83 ENCOUNTER FOR NONPROCREATIVE GENETIC COUNSELING: Primary | ICD-10-CM

## 2024-07-02 PROCEDURE — 96040 HC GENETIC COUNSELING, EACH 30 MINUTES: CPT | Mod: GT,95 | Performed by: GENETIC COUNSELOR, MS

## 2024-07-02 NOTE — NURSING NOTE
How would you like to obtain your AVS? [facility] [place]  If the video visit is dropped, the invitation should be resent by: Text to cell phone: [phone]  Will anyone else be joining your video visit? No

## 2024-07-02 NOTE — Clinical Note
2024      RE: Donald Duarte  [address]  [place] [address]     Dear Colleague,    Thank you for the opportunity to participate in the care of your patient, Donald Duarte, at the [facility] at [facility]. Please see a copy of my visit note below.    Donald Duarte was seen for a genetic counseling appointment at the request of Dr. Grant today given her diagnosis of CMT.     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 Her symptoms began at age 18. Currently she reports bilateral foot drop. She does not use orthotics but changes the way she walks to avoid tripping. Donald experiences occasional finger weakness but no other weakness in her upper extremities. Donald has a diagnosis of tachycardia. Donald denies numbness, tingling, and pain in her extremities, kidney dysfunction, GI/bladder dysfunction, and carpal tunnel syndrome. Donald reports that she is otherwise healthy.    Family History: A three generation pedigree was obtained today and scanned into the EMR. This family history is by patient report only and has not been verified with medical records except where noted. The following information is significant:   Children-   Donald does not have children  Siblings-   21 year old brother with severe asthma  17 year old brother with severe asthma  22 year old sister who is alive and well  Parents-   Father (age 50) who is alive and well  Mother (age 48) who has high blood pressure  Maternal Relatives-   Maternal uncle in his 50s with severe asthma  Maternal uncle in his 50s who is alive and well  Maternal cousins are alive and well  No information is available regarding maternal grandfather  Maternal grandmother  at age 50 due to a medication interaction  Paternal Relatives-   Paternal  aunt who is in her 50s and had breast cancer that was diagnosed in her 30s  Paternal aunt with liver failure s/p transplant  Paternal cousins are alive and well  Paternal grandfather is alive and well  Paternal grandmother  in her 50s due to aspiration pneumonia. She had a diagnosis of lupus     Family history is otherwise largely non-contributory. Ancestry is [place], [place] and [place]. Consanguinity was denied.     Discussion: Clinical features, genetics and inheritance of Charcot Marysol Tooth disease and options for genetic testing were reviewed.     Charcot-Marysol-Tooth Neuropathy (CMT) is common genetic form of peripheral neuropathy affecting 1 in 2500 individuals. In general, CMT is a condition that affects the peripheral nerves. Our peripheral nerves carry messages from our brain and our spine to the rest of our body and back. When these nerves are not working properly, this can lead to symptoms in the motor and sensory systems, especially in the parts of the body that are farthest away from the brain and spine.     Each nerve has two parts, the covering around the nerve called the myelin and the central part of the nerve called the axon. The myelin controls the speed at which the message is sent. The axon controls the strength of that message. The part of the nerve that is not functioning properly tells us what type of CMT an individual has.    There are many different types of CMT. These types are labeled with numbers (1,2,4 etc) and can be determined based on the results of a nerve conduction study. CMT Type 1 is the demyelinating form of the condition.  Demyelinating  means that this type of CMT causes the covering around the nerve (myelin) to form incorrectly. When the myelin is not formed correctly the messages cannot travel the whole length of the nerve and they do not travel at the speed that they would in an individual who does not have CMT. CMT Type 2 is the axonal form of the condition.   Axonal  means that the axon of the nerve is damaged and the messages being sent down the nerves are not clear or accurate. This leads to the muscles not being able to understand the garbled message that is being sent. There are also some less common types of CMT (3, 4) that have a mixture of these axonal and demyelinating features. There are also many subtypes of CMT within each type. These subtypes are labeled with letters (A, B, C etc) and can only be determined through genetic testing.     Peripheral neuropathy that causes damage to two types of the nerves, the motor nerves (involved in muscle movement) and the sensory nerves (involved in sensation or feeling). Damage to motor nerves causes muscle weakness and difficulty with muscle movement, especially in the hands and feet. This can lead to difficulty walking, writing, putting on jewelry and many other types of movement. Damage to the sensory nerves can cause numbness, tingling and impaired balance, which can lead to fatigue.    CMT is highly variable, even within members of the same family. Individuals may not experience any symptoms, these symptoms may be mild, or they might begin as mild and gradually become more severe.    Basic genetic information was reviewed. Our genes are sequences of letters that provide instructions that help our body grow, develop and function. Sometimes a change occurs in one of our genes that causes the body to be unable to read these instructions. This results in a genetic condition.     We know that there are many different types of genetic changes that cause CMT. The genetic changes that cause CMT could be a change in one letter in a gene or it could be a deletion or duplication of part or all of the gene. For this reason, testing for a panel of genes related to CMT is recommended. This testing looks at many genes that are associated with CMT to determine if any changes are present.    There are three possible results for this  testing:    Positive: A positive result indicates that a genetic variant has been identified that explains the cause of Donald s symptoms. A positive result may provide information on prognosis and other symptoms related to the genetic change. It may also help guide medical management for Donald and may provide information to other family members regarding their risk.   Negative: A negative result indicates that a disease causing genetic variant was not identified  Variant of uncertain significance (VUS): A VUS is an uncertain result that indicates a genetic change was identified, but it is currently unknown if that change is associated with a genetic disorder.       Risks benefits and limitations of this testing were reviewed. A positive result in any of the genes that are tested would provide a genetic diagnosis of CMT, explain the cause of Donald's clinical symptoms, provide information on prognosis and appropriate medical management, as well as provide information regarding risk to other family members. For this reason, this testing is considered medically necessary and standard of care for patients like Donald.     Next Generation Sequencing is a well established technology utilized by all molecular genetic labs throughout the country for identifying disease-causing mutations in various genes.  Next Generation Sequencing is currently the standard of care for genetic testing through the use of panels of genes.  The recommended testing for Donald  is DIAGNOSTIC testing, and it is NOT investigational.    Genetic testing was offered via a comprehensive neuropathy panel at [facility]. Risks, benefits and limitations of this testing were reviewed. We discussed the option to order this testing through insurance or a 250 dollar patient pay option. Donald consented to to the comprehensive neuropathy panel via the patient pay option    Plan:  1. comprehensive neuropathy panel at [facility]  via their 250 dollar patient pay option  2. Return pending results of above testing  3. Contact information was provided should any questions arise in the future.     Janessa Goldsmith [facility] Counselor  Division of Genetics and Metabolism  (p) [phone]  (f) [phone]     Total time spent in consultation with the family was approximately 25 minutes      Cc: No Letter       Please do not hesitate to contact me if you have any questions/concerns.     Sincerely,       Janessa Goldsmith GC

## 2024-07-03 NOTE — PROGRESS NOTES
Donald Duarte was seen for a genetic counseling appointment at the request of Dr. Grant today given her diagnosis of CMT.     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 Her symptoms began at age 18. Currently she reports bilateral foot drop. She does not use orthotics but changes the way she walks to avoid tripping. Donald experiences occasional finger weakness but no other weakness in her upper extremities. Donald has a diagnosis of tachycardia. Donald denies numbness, tingling, and pain in her extremities, kidney dysfunction, GI/bladder dysfunction, and carpal tunnel syndrome. Donald reports that she is otherwise healthy.    Family History: A three generation pedigree was obtained today and scanned into the EMR. This family history is by patient report only and has not been verified with medical records except where noted. The following information is significant:   Children-   Donald does not have children  Siblings-   21 year old brother with severe asthma  17 year old brother with severe asthma  22 year old sister who is alive and well  Parents-   Father (age 50) who is alive and well  Mother (age 48) who has high blood pressure  Maternal Relatives-   Maternal uncle in his 50s with severe asthma  Maternal uncle in his 50s who is alive and well  Maternal cousins are alive and well  No information is available regarding maternal grandfather  Maternal grandmother  at age 50 due to a medication interaction  Paternal Relatives-   Paternal aunt who is in her 50s and had breast cancer that was diagnosed in her 30s  Paternal aunt with liver failure s/p transplant  Paternal cousins are alive and well  Paternal grandfather is alive and well  Paternal grandmother  in her 50s due to aspiration pneumonia. She had a diagnosis of lupus     Family history is otherwise largely non-contributory. Ancestry is [place],  [place]. Consanguinity was denied.     Discussion: Clinical features, genetics and inheritance of Charcot Marysol Tooth disease and options for genetic testing were reviewed.     Charcot-Marysol-Tooth Neuropathy (CMT) is common genetic form of peripheral neuropathy affecting 1 in 2500 individuals. In general, CMT is a condition that affects the peripheral nerves. Our peripheral nerves carry messages from our brain and our spine to the rest of our body and back. When these nerves are not working properly, this can lead to symptoms in the motor and sensory systems, especially in the parts of the body that are farthest away from the brain and spine.     Each nerve has two parts, the covering around the nerve called the myelin and the central part of the nerve called the axon. The myelin controls the speed at which the message is sent. The axon controls the strength of that message. The part of the nerve that is not functioning properly tells us what type of CMT an individual has.    There are many different types of CMT. These types are labeled with numbers (1,2,4 etc) and can be determined based on the results of a nerve conduction study. CMT Type 1 is the demyelinating form of the condition.  Demyelinating  means that this type of CMT causes the covering around the nerve (myelin) to form incorrectly. When the myelin is not formed correctly the messages cannot travel the whole length of the nerve and they do not travel at the speed that they would in an individual who does not have CMT. CMT Type 2 is the axonal form of the condition.  Axonal  means that the axon of the nerve is damaged and the messages being sent down the nerves are not clear or accurate. This leads to the muscles not being able to understand the garbled message that is being sent. There are also some less common types of CMT (3, 4) that have a mixture of these axonal and demyelinating features. There are also many subtypes of CMT within  each type. These subtypes are labeled with letters (A, B, C etc) and can only be determined through genetic testing.     Peripheral neuropathy that causes damage to two types of the nerves, the motor nerves (involved in muscle movement) and the sensory nerves (involved in sensation or feeling). Damage to motor nerves causes muscle weakness and difficulty with muscle movement, especially in the hands and feet. This can lead to difficulty walking, writing, putting on jewelry and many other types of movement. Damage to the sensory nerves can cause numbness, tingling and impaired balance, which can lead to fatigue.    CMT is highly variable, even within members of the same family. Individuals may not experience any symptoms, these symptoms may be mild, or they might begin as mild and gradually become more severe.    Basic genetic information was reviewed. Our genes are sequences of letters that provide instructions that help our body grow, develop and function. Sometimes a change occurs in one of our genes that causes the body to be unable to read these instructions. This results in a genetic condition.     We know that there are many different types of genetic changes that cause CMT. The genetic changes that cause CMT could be a change in one letter in a gene or it could be a deletion or duplication of part or all of the gene. For this reason, testing for a panel of genes related to CMT is recommended. This testing looks at many genes that are associated with CMT to determine if any changes are present.    There are three possible results for this testing:    Positive: A positive result indicates that a genetic variant has been identified that explains the cause of Donald s symptoms. A positive result may provide information on prognosis and other symptoms related to the genetic change. It may also help guide medical management for Donald and may provide information to other family members regarding their risk.    Negative: A negative result indicates that a disease causing genetic variant was not identified  Variant of uncertain significance (VUS): A VUS is an uncertain result that indicates a genetic change was identified, but it is currently unknown if that change is associated with a genetic disorder.       Risks benefits and limitations of this testing were reviewed. A positive result in any of the genes that are tested would provide a genetic diagnosis of CMT, explain the cause of Donald's clinical symptoms, provide information on prognosis and appropriate medical management, as well as provide information regarding risk to other family members. For this reason, this testing is considered medically necessary and standard of care for patients like Donald.     Next Generation Sequencing is a well established technology utilized by all molecular genetic labs throughout the country for identifying disease-causing mutations in various genes.  Next Generation Sequencing is currently the standard of care for genetic testing through the use of panels of genes.  The recommended testing for Donald  is DIAGNOSTIC testing, and it is NOT investigational.    Genetic testing was offered via a comprehensive neuropathy panel at [facility]. Risks, benefits and limitations of this testing were reviewed. We discussed the option to order this testing through insurance or a 250 dollar patient pay option. Donald consented to to the comprehensive neuropathy panel via the patient pay option    Plan:  1. comprehensive neuropathy panel at [facility] via their 250 dollar patient pay option  2. Return pending results of above testing  3. Contact information was provided should any questions arise in the future.     Janessa Goldsmith [facility]  Genetic Counselor  Division of Genetics and Metabolism  (p) [phone]  (f) [phone]     Total time spent in consultation with the family was approximately 25 minutes      Cc: No  Letter

## 2024-07-26 ENCOUNTER — TELEPHONE (OUTPATIENT)
Dept: CONSULT | Facility: CLINIC | Age: 29
End: 2024-07-26
Payer: COMMERCIAL

## 2024-07-26 NOTE — LETTER
July 26, 2024      TO: Donald Duarte  [address]  [place] [address]       Dear Donald     Thank you for allowing me to be a part of your healthcare at the [facility].  At your visit on 7/26/24 your genetic test results were discussed. As we discussed, your genetic test results did not find any disease causing or pathogenic variants. 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 This letter is a brief summary of our discussion and these genetic test results. I have also included a copy of the lab report for your records.     Our genes are sequences of letters that provide instructions that help our body grow, develop and function. We all have changes or variations in our genes that make us unique. Some variations cause the body to be unable to read the instructions. These variations are called pathogenic and can result in a genetic condition. Your genetic testing looked at many of your genes related to nerve function and CMT to determine if any variants or changes were present.     As we discussed by phone, this test found one variant of uncertain significance. This variant is technically called DNM2 c.1043G>A. Variants of uncertain significance are changes in our genes that may or may not cause disease. Currently we have limited information regarding whether or not these variants will impact your health. Disease causing variants in these genes are associated with a wide variety of genetic conditions. Your genetic testing does not confirm a diagnosis of these conditions. The following information is provided for informational purposes only.     Clinical Presentation of DNM2  Disease causing changes in the DNM2 gene have been associated with one of two genetic conditions called Charcot Marysol Tooth disease and centronuclear myopathy. There is some evidence that disease causing changes in this gene may also  cause a condition called lethal congenital contracture syndrome.     Charcot-Marysol-Tooth disease (CMT) is a peripheral neuropathy that causes damage to two types of the nerves, the motor nerves (involved in muscle movement) and the sensory nerves (involved in sensation or feeling). Damage to the motor nerves causes muscle weakness and difficulty with muscle movement, especially in the hands and feet. This can lead to difficulty walking, writing, putting on jewelry and many other types of movement. Damage to the sensory nerves can cause numbness, tingling and impaired balance, which can lead to fatigue. The presentation of this condition is highly variable, even within members of the same family who have the exact same type of CMT. Individuals with CMT may not experience any symptoms, these symptoms may be mild, or they might begin as mild and gradually become more severe.     DNM2 related CMT (also called CMTDIB or CMT2M) can cause damage to the axon (central part of the nerve), the myelin (outer coating of the nerve) or both. Symptoms of this condition typically begin in the first or second decade of life and usually impact the feet and/or toes first. Based on your health history, it is possible that you are affected with DNM2-related CMT.     Centronuclear myopathy  is a slowly progressive condition that causes muscle weakness beginning any time between early childhood and adulthood. Individuals with this condition may have low muscle tone at birth, delayed motor milestones such as sitting or walking and difficulty walking due to muscle weakness that mainly affects the muscles closest to the center of the body (proximal muscles). Other features of this condition include facial muscle weakness, weakness of the eye muscles and eyelids and inability to fully extend certain joints (contractures). Your health history does not align with what is expected for people who have this condition. For this reason, it is  unlikely that you have a diagnosis of centronuclear myopathy.     Lethal congenital contracture syndrome causes reduced movement in the joints, underdeveloped lungs and fluid build up in multiple organs. These symptoms can be seen around 13 weeks into a pregnancy and this leads to the demise of a baby prior to their birth. You must have two disease causing changes in this gene in order to have this condition. Because your health history does not align with these symptoms and you were only found to have one change in this gene, you are not affected with this condition. Individuals with one disease causing change in this gene are carriers of this condition and could have a child with this condition if their partner is also a carrier.     At this time, we are unable to determine if the variant of uncertain significance identified are disease causing or not. In the future, we may learn more about this variant and better understanding it's impacts on your health, if any. An appointment with our CMT care team can be scheduled for medical recommendations related to your symptoms.     Thank you again for allowing us to be a part of your care. Please do not hesitate to contact me with additional questions or concerns.     Sincerely,  Janessa Goldsmith [facility]  Genetic Counselor  Division of Genetics and Metabolism  (p) [phone]

## 2024-07-26 NOTE — TELEPHONE ENCOUNTER
Contacted Donald and reviewed the following information:    Dear Donald    Thank you for allowing me to be a part of your healthcare at the [facility].  At your visit on 7/26/24 your genetic test results were discussed. As we discussed, your genetic test results did not find any disease causing or pathogenic variants. 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 This letter is a brief summary of our discussion and these genetic test results. I have also included a copy of the lab report for your records.     Our genes are sequences of letters that provide instructions that help our body grow, develop and function. We all have changes or variations in our genes that make us unique. Some variations cause the body to be unable to read the instructions. These variations are called pathogenic and can result in a genetic condition. Your genetic testing looked at many of your genes related to nerve function and CMT to determine if any variants or changes were present.     As we discussed by phone, this test found one variant of uncertain significance. This variant is technically called DNM2 c.1043G>A. Variants of uncertain significance are changes in our genes that may or may not cause disease. Currently we have limited information regarding whether or not these variants will impact your health. Disease causing variants in these genes are associated with a wide variety of genetic conditions. Your genetic testing does not confirm a diagnosis of these conditions. The following information is provided for informational purposes only.     Clinical Presentation of DNM2  Disease causing changes in the DNM2 gene have been associated with one of two genetic conditions called Charcot Marysol Tooth disease and centronuclear myopathy. There is some evidence that disease causing changes in this gene may also cause a condition called lethal congenital  contracture syndrome.    Charcot-Marysol-Tooth disease (CMT) is a peripheral neuropathy that causes damage to two types of the nerves, the motor nerves (involved in muscle movement) and the sensory nerves (involved in sensation or feeling). Damage to the motor nerves causes muscle weakness and difficulty with muscle movement, especially in the hands and feet. This can lead to difficulty walking, writing, putting on jewelry and many other types of movement. Damage to the sensory nerves can cause numbness, tingling and impaired balance, which can lead to fatigue. The presentation of this condition is highly variable, even within members of the same family who have the exact same type of CMT. Individuals with CMT may not experience any symptoms, these symptoms may be mild, or they might begin as mild and gradually become more severe.    DNM2 related CMT (also called CMTDIB or CMT2M) can cause damage to the axon (central part of the nerve), the myelin (outer coating of the nerve) or both. Symptoms of this condition typically begin in the first or second decade of life and usually impact the feet and/or toes first. Based on your health history, it is possible that you are affected with DNM2-related CMT.    Centronuclear myopathy  is a slowly progressive condition that causes muscle weakness beginning any time between early childhood and adulthood. Individuals with this condition may have low muscle tone at birth, delayed motor milestones such as sitting or walking and difficulty walking due to muscle weakness that mainly affects the muscles closest to the center of the body (proximal muscles). Other features of this condition include facial muscle weakness, weakness of the eye muscles and eyelids and inability to fully extend certain joints (contractures). Your health history does not align with what is expected for people who have this condition. For this reason, it is unlikely that you have a diagnosis of centronuclear  myopathy.    Lethal congenital contracture syndrome causes reduced movement in the joints, underdeveloped lungs and fluid build up in multiple organs. These symptoms can be seen around 13 weeks into a pregnancy and this leads to the demise of a baby prior to their birth. You must have two disease causing changes in this gene in order to have this condition. Because your health history does not align with these symptoms and you were only found to have one change in this gene, you are not affected with this condition. Individuals with one disease causing change in this gene are carriers of this condition and could have a child with this condition if their partner is also a carrier.     At this time, we are unable to determine if the variant of uncertain significance identified are disease causing or not. In the future, we may learn more about this variant and better understanding it's impacts on your health, if any. An appointment with our CMT care team can be scheduled for medical recommendations related to your symptoms.    Thank you again for allowing us to be a part of your care. Please do not hesitate to contact me with additional questions or concerns.    Sincerely,  Janessa Goldsmith MS [facility]  Genetic Counselor  Division of Genetics and Metabolism  (p) [phone]

## 2024-08-09 ENCOUNTER — PATIENT OUTREACH (OUTPATIENT)
Dept: CARE COORDINATION | Facility: CLINIC | Age: 29
End: 2024-08-09
Payer: COMMERCIAL

## 2024-08-23 ENCOUNTER — PATIENT OUTREACH (OUTPATIENT)
Dept: CARE COORDINATION | Facility: CLINIC | Age: 29
End: 2024-08-23
Payer: COMMERCIAL

## 2024-09-23 ENCOUNTER — TELEPHONE (OUTPATIENT)
Dept: FAMILY MEDICINE | Facility: CLINIC | Age: 29
End: 2024-09-23
Payer: COMMERCIAL

## 2024-09-23 DIAGNOSIS — F90.2 ATTENTION DEFICIT HYPERACTIVITY DISORDER (ADHD), COMBINED TYPE: Primary | ICD-10-CM

## 2024-09-23 DIAGNOSIS — F90.2 ATTENTION DEFICIT HYPERACTIVITY DISORDER (ADHD), COMBINED TYPE: ICD-10-CM

## 2024-09-23 RX ORDER — DEXTROAMPHETAMINE SACCHARATE, AMPHETAMINE ASPARTATE, DEXTROAMPHETAMINE SULFATE AND AMPHETAMINE SULFATE 2.5; 2.5; 2.5; 2.5 MG/1; MG/1; MG/1; MG/1
10 TABLET ORAL 2 TIMES DAILY
Qty: 60 TABLET | Refills: 0 | Status: SHIPPED | OUTPATIENT
Start: 2024-09-23

## 2024-09-23 NOTE — TELEPHONE ENCOUNTER
Please call Donald. We are supposed to do a yearly urine drug screen and a controlled substance agreement to anyone getting a controlled substance here. Please help her make a lab only appointment for this. Thank you.     Zuleyka Grant MD

## 2024-09-25 ENCOUNTER — LAB (OUTPATIENT)
Dept: LAB | Facility: CLINIC | Age: 29
End: 2024-09-25
Payer: COMMERCIAL

## 2024-09-25 DIAGNOSIS — F90.2 ATTENTION DEFICIT HYPERACTIVITY DISORDER (ADHD), COMBINED TYPE: ICD-10-CM

## 2024-09-25 LAB
AMPHETAMINES UR QL SCN: ABNORMAL
BARBITURATES UR QL SCN: ABNORMAL
BENZODIAZ UR QL SCN: ABNORMAL
BZE UR QL SCN: ABNORMAL
CANNABINOIDS UR QL SCN: ABNORMAL
FENTANYL UR QL: ABNORMAL
OPIATES UR QL SCN: ABNORMAL
PCP QUAL URINE (ROCHE): ABNORMAL

## 2024-09-25 PROCEDURE — 80307 DRUG TEST PRSMV CHEM ANLYZR: CPT

## 2024-10-12 ENCOUNTER — HEALTH MAINTENANCE LETTER (OUTPATIENT)
Age: 29
End: 2024-10-12

## 2024-11-09 DIAGNOSIS — F90.2 ATTENTION DEFICIT HYPERACTIVITY DISORDER (ADHD), COMBINED TYPE: ICD-10-CM

## 2024-11-09 RX ORDER — DEXTROAMPHETAMINE SACCHARATE, AMPHETAMINE ASPARTATE, DEXTROAMPHETAMINE SULFATE AND AMPHETAMINE SULFATE 2.5; 2.5; 2.5; 2.5 MG/1; MG/1; MG/1; MG/1
10 TABLET ORAL 2 TIMES DAILY
Qty: 60 TABLET | Refills: 0 | Status: SHIPPED | OUTPATIENT
Start: 2024-11-09

## 2024-12-26 DIAGNOSIS — F90.2 ATTENTION DEFICIT HYPERACTIVITY DISORDER (ADHD), COMBINED TYPE: Primary | ICD-10-CM

## 2024-12-26 RX ORDER — DEXTROAMPHETAMINE SACCHARATE, AMPHETAMINE ASPARTATE, DEXTROAMPHETAMINE SULFATE AND AMPHETAMINE SULFATE 2.5; 2.5; 2.5; 2.5 MG/1; MG/1; MG/1; MG/1
10 TABLET ORAL 2 TIMES DAILY
Qty: 60 TABLET | Refills: 0 | Status: SHIPPED | OUTPATIENT
Start: 2024-12-26

## 2024-12-26 NOTE — TELEPHONE ENCOUNTER
Refill seems to be appropriate at this time.  Given as below.    Note from 6/12/2024 as written by PCP (italicized):  Attention deficit hyperactivity disorder (ADHD), combined type: has formal ADHD diagnosis. Refilled Adderall and signed CSA.         Madi Lopez MD  [facility] [place] [address]  Phone: [phone]  Fax: [phone]    12/26/2024  4:47 PM      Donald was seen today for medication refill.    Diagnoses and all orders for this visit:    Attention deficit hyperactivity disorder (ADHD), combined type  -     amphetamine-dextroamphetamine (ADDERALL) 10 MG tablet; Take 1 tablet (10 mg) by mouth 2 times daily

## 2025-02-26 ENCOUNTER — OFFICE VISIT (OUTPATIENT)
Dept: FAMILY MEDICINE | Facility: CLINIC | Age: 30
End: 2025-02-26
Payer: COMMERCIAL

## 2025-02-26 VITALS
HEIGHT: 61 IN | RESPIRATION RATE: 20 BRPM | HEART RATE: 92 BPM | BODY MASS INDEX: 29.07 KG/M2 | SYSTOLIC BLOOD PRESSURE: 106 MMHG | TEMPERATURE: 98.2 F | OXYGEN SATURATION: 96 % | DIASTOLIC BLOOD PRESSURE: 70 MMHG | WEIGHT: 154 LBS

## 2025-02-26 DIAGNOSIS — Z80.3 FAMILY HISTORY OF BREAST CANCER: ICD-10-CM

## 2025-02-26 DIAGNOSIS — Z00.00 ROUTINE GENERAL MEDICAL EXAMINATION AT A HEALTH CARE FACILITY: ICD-10-CM

## 2025-02-26 DIAGNOSIS — Z71.3 ENCOUNTER FOR WEIGHT LOSS COUNSELING: ICD-10-CM

## 2025-02-26 DIAGNOSIS — N89.8 VAGINAL DISCHARGE: ICD-10-CM

## 2025-02-26 DIAGNOSIS — Z13.220 SCREENING FOR HYPERLIPIDEMIA: ICD-10-CM

## 2025-02-26 DIAGNOSIS — Z13.1 SCREENING FOR DIABETES MELLITUS: ICD-10-CM

## 2025-02-26 DIAGNOSIS — E66.3 OVERWEIGHT: ICD-10-CM

## 2025-02-26 DIAGNOSIS — Z13.29 SCREENING FOR THYROID DISORDER: ICD-10-CM

## 2025-02-26 DIAGNOSIS — Z11.3 SCREENING FOR STDS (SEXUALLY TRANSMITTED DISEASES): Primary | ICD-10-CM

## 2025-02-26 LAB
CLUE CELLS: ABNORMAL
TRICHOMONAS, WET PREP: ABNORMAL
WBC'S/HIGH POWER FIELD, WET PREP: ABNORMAL
YEAST, WET PREP: ABNORMAL

## 2025-02-26 PROCEDURE — 87591 N.GONORRHOEAE DNA AMP PROB: CPT | Performed by: STUDENT IN AN ORGANIZED HEALTH CARE EDUCATION/TRAINING PROGRAM

## 2025-02-26 PROCEDURE — 87491 CHLMYD TRACH DNA AMP PROBE: CPT | Performed by: STUDENT IN AN ORGANIZED HEALTH CARE EDUCATION/TRAINING PROGRAM

## 2025-02-26 PROCEDURE — 87210 SMEAR WET MOUNT SALINE/INK: CPT | Performed by: STUDENT IN AN ORGANIZED HEALTH CARE EDUCATION/TRAINING PROGRAM

## 2025-02-26 RX ORDER — NALTREXONE HYDROCHLORIDE 50 MG/1
25 TABLET, FILM COATED ORAL 2 TIMES DAILY
Qty: 45 TABLET | Refills: 0 | Status: SHIPPED | OUTPATIENT
Start: 2025-02-26

## 2025-02-26 RX ORDER — BUPROPION HYDROCHLORIDE 150 MG/1
150 TABLET ORAL EVERY MORNING
Qty: 30 TABLET | Refills: 2 | Status: SHIPPED | OUTPATIENT
Start: 2025-02-26

## 2025-02-26 SDOH — HEALTH STABILITY: PHYSICAL HEALTH: ON AVERAGE, HOW MANY DAYS PER WEEK DO YOU ENGAGE IN MODERATE TO STRENUOUS EXERCISE (LIKE A BRISK WALK)?: 0 DAYS

## 2025-02-26 ASSESSMENT — SOCIAL DETERMINANTS OF HEALTH (SDOH): HOW OFTEN DO YOU GET TOGETHER WITH FRIENDS OR RELATIVES?: ONCE A WEEK

## 2025-02-26 NOTE — PATIENT INSTRUCTIONS
Patient Education   Preventive Care Advice   This is general advice given by our system to help you stay healthy. However, your care team may have specific advice just for you. Please talk to your care team about your preventive care needs.  Nutrition  Eat 5 or more servings of fruits and vegetables each day.  Try wheat bread, brown rice and whole grain pasta (instead of white bread, rice, and pasta).  Get enough calcium and vitamin D. Check the label on foods and aim for 100% of the RDA (recommended daily allowance).  Lifestyle  Exercise at least 150 minutes each week  (30 minutes a day, 5 days a week).  Do muscle strengthening activities 2 days a week. These help control your weight and prevent disease.  No smoking.  Wear sunscreen to prevent skin cancer.  Have a dental exam and cleaning every 6 months.  Yearly exams  See your health care team every year to talk about:  Any changes in your health.  Any medicines your care team has prescribed.  Preventive care, family planning, and ways to prevent chronic diseases.  Shots (vaccines)   HPV shots (up to age 26), if you've never had them before.  Hepatitis B shots (up to age 59), if you've never had them before.  COVID-19 shot: Get this shot when it's due.  Flu shot: Get a flu shot every year.  Tetanus shot: Get a tetanus shot every 10 years.  Pneumococcal, hepatitis A, and RSV shots: Ask your care team if you need these based on your risk.  Shingles shot (for age 50 and up)  General health tests  Diabetes screening:  Starting at age 35, Get screened for diabetes at least every 3 years.  If you are younger than age 35, ask your care team if you should be screened for diabetes.  Cholesterol test: At age 39, start having a cholesterol test every 5 years, or more often if advised.  Bone density scan (DEXA): At age 50, ask your care team if you should have this scan for osteoporosis (brittle bones).  Hepatitis C: Get tested at least once in your life.  STIs (sexually  transmitted infections)  Before age 24: Ask your care team if you should be screened for STIs.  After age 24: Get screened for STIs if you're at risk. You are at risk for STIs (including HIV) if:  You are sexually active with more than one person.  You don't use condoms every time.  You or a partner was diagnosed with a sexually transmitted infection.  If you are at risk for HIV, ask about PrEP medicine to prevent HIV.  Get tested for HIV at least once in your life, whether you are at risk for HIV or not.  Cancer screening tests  Cervical cancer screening: If you have a cervix, begin getting regular cervical cancer screening tests starting at age 21.  Breast cancer scan (mammogram): If you've ever had breasts, begin having regular mammograms starting at age 40. This is a scan to check for breast cancer.  Colon cancer screening: It is important to start screening for colon cancer at age 45.  Have a colonoscopy test every 10 years (or more often if you're at risk) Or, ask your provider about stool tests like a FIT test every year or Cologuard test every 3 years.  To learn more about your testing options, visit:   .  For help making a decision, visit:   https://bit.ly/xk57331.  Prostate cancer screening test: If you have a prostate, ask your care team if a prostate cancer screening test (PSA) at age 55 is right for you.  Lung cancer screening: If you are a current or former smoker ages 50 to 80, ask your care team if ongoing lung cancer screenings are right for you.  For informational purposes only. Not to replace the advice of your health care provider. Copyright   2023 [employer]. All rights reserved. Clinically reviewed by the [facility] Transitions Program. [employer] [id number] - REV 01/24.

## 2025-02-26 NOTE — PROGRESS NOTES
Preventive Care Visit  [facility] SHARIF Mcgovern MD, Family Medicine  Feb 26, 2025  {Provider  Link to [facility] :556420}    {PROVIDER CHARTING PREFERENCE:799004}    Flynn Bennett is a 29 year old, presenting for the following:  Physical        2/26/2025    10:35 AM   Additional Questions   Roomed by Lay HUSSEIN         2/26/2025    10:35 AM   Patient Reported Additional Medications   Patient reports taking the following new medications None          HPI  ***  {MA/LPN/RN Pre-Provider Visit Orders- hCG/UA/Strep (Optional):341801}  {SUPERLIST (Optional):527402}  {additonal problems for provider to add (Optional):082440}  Health Care Directive  Patient does not have a Health Care Directive: Discussed advance care planning with patient; however, patient declined at this time.      2/26/2025   General Health   How would you rate your overall physical health? (!) FAIR   Feel stress (tense, anxious, or unable to sleep) Not at all         2/26/2025   Nutrition   Three or more servings of calcium each day? (!) NO   Diet: Regular (no restrictions)   How many servings of fruit and vegetables per day? (!) 0-1   How many sweetened beverages each day? 0-1         2/26/2025   Exercise   Days per week of moderate/strenous exercise 0 days   (!) EXERCISE CONCERN      2/26/2025   Social Factors   Frequency of gathering with friends or relatives Once a week   Worry food won't last until get money to buy more No   Food not last or not have enough money for food? No   Do you have housing? (Housing is defined as stable permanent housing and does not include staying ouside in a car, in a tent, in an abandoned building, in an overnight shelter, or couch-surfing.) Yes   Are you worried about losing your housing? No   Lack of transportation? No   Unable to get utilities (heat,electricity)? No         2/26/2025   Dental   Dentist two times every year? Yes            Today's PHQ-2 Score:       2/26/2025     10:23 AM   PHQ-2 ( 1999 [employer])   Q1: Little interest or pleasure in doing things 1   Q2: Feeling down, depressed or hopeless 0   PHQ-2 Score 1    Q1: Little interest or pleasure in doing things Several days   Q2: Feeling down, depressed or hopeless Not at all   PHQ-2 Score 1       Patient-reported           2/26/2025   Substance Use   Alcohol more than 3/day or more than 7/wk No   Do you use any other substances recreationally? No     Social History     Tobacco Use    Smoking status: Never    Smokeless tobacco: Never   Vaping Use    Vaping status: Never Used   Substance Use Topics    Alcohol use: Yes     Comment: 5 per month    Drug use: No     {Provider  If there are gaps in the social history shown above, please follow the link to update and then refresh the note Link to Social and Substance History :260137}      5/31/2022   LAST FHS-7 RESULTS   1st degree relative breast or ovarian cancer No    Any relative bilateral breast cancer Yes    Any male have breast cancer No    Any ONE woman have BOTH breast AND ovarian cancer Yes    Any woman with breast cancer before 50yrs Yes    2 or more relatives with breast AND/OR ovarian cancer No    2 or more relatives with breast AND/OR bowel cancer No        Proxy-reported     {If any of the questions to the FHS7 are answered yes, consider referral for genetic counseling.    Additional indications for genetic referral include personal history of breast or ovarian cancer, genetic mutation in 1st degree relative which increases risk of breast cancer including BRCA1, BRCA2, AFSHIN, PALB 2, TP53, CHEK2, PTEN, CDH1, STK11 (per ACS) and/or 1st degree relative with history of pancreatic or high-risk prostate cancer (per NCCN):017216}   {Mammogram Decision Support (Optional):703011}        2/26/2025   STI Screening   New sexual partner(s) since last STI/HIV test? No     History of abnormal Pap smear: { :540404}        9/8/2023     4:21 PM 1/11/2021     7:31 AM 11/21/2017     3:24 PM  "  PAP / HPV   PAP Negative for Intraepithelial Lesion or Malignancy (NILM)      PAP (Historical)  NIL  NIL            2/26/2025   Contraception/Family Planning   Questions about contraception or family planning (!) YES ***     {Provider  REQUIRED FOR [facility] Use the storyboard to review patient history, after sections have been marked as reviewed, refresh note to capture documentation:863776}   Reviewed and updated as needed this visit by Provider                    {HISTORY OPTIONS (Optional):056904}    {ROS Picklists (Optional):018688}     Objective    Exam  There were no vitals taken for this visit.   Estimated body mass index is 25.47 kg/m  as calculated from the following:    Height as of 6/12/24: 1.565 m (5' 1.61\").    Weight as of 6/12/24: 62.4 kg (137 lb 8 oz).    Physical Exam  {Exam Choices (Optional):904525}        Signed Electronically by: Pretty Mcgovern MD  {Email feedback regarding this note to [email or web address]   :416696}  "

## 2025-02-27 LAB
C TRACH DNA SPEC QL PROBE+SIG AMP: NEGATIVE
N GONORRHOEA DNA SPEC QL NAA+PROBE: NEGATIVE
SPECIMEN TYPE: NORMAL

## 2025-03-18 DIAGNOSIS — Z30.09 COUNSELING FOR BIRTH CONTROL, ORAL CONTRACEPTIVES: ICD-10-CM

## 2025-03-18 RX ORDER — DROSPIRENONE AND ETHINYL ESTRADIOL 0.02-3(28)
1 KIT ORAL DAILY
Qty: 84 TABLET | Refills: 0 | Status: SHIPPED | OUTPATIENT
Start: 2025-03-18

## 2025-03-23 ENCOUNTER — MYC MEDICAL ADVICE (OUTPATIENT)
Dept: FAMILY MEDICINE | Facility: CLINIC | Age: 30
End: 2025-03-23
Payer: COMMERCIAL

## 2025-03-24 ENCOUNTER — OFFICE VISIT (OUTPATIENT)
Dept: FAMILY MEDICINE | Facility: CLINIC | Age: 30
End: 2025-03-24
Payer: COMMERCIAL

## 2025-03-24 VITALS
BODY MASS INDEX: 29.07 KG/M2 | RESPIRATION RATE: 12 BRPM | OXYGEN SATURATION: 99 % | HEART RATE: 88 BPM | SYSTOLIC BLOOD PRESSURE: 118 MMHG | WEIGHT: 154 LBS | HEIGHT: 61 IN | DIASTOLIC BLOOD PRESSURE: 80 MMHG | TEMPERATURE: 97.3 F

## 2025-03-24 DIAGNOSIS — S90.211A SUBUNGUAL HEMATOMA OF GREAT TOE OF RIGHT FOOT, INITIAL ENCOUNTER: Primary | ICD-10-CM

## 2025-03-24 PROCEDURE — 3074F SYST BP LT 130 MM HG: CPT | Performed by: FAMILY MEDICINE

## 2025-03-24 PROCEDURE — 99212 OFFICE O/P EST SF 10 MIN: CPT | Performed by: FAMILY MEDICINE

## 2025-03-24 PROCEDURE — 1125F AMNT PAIN NOTED PAIN PRSNT: CPT | Performed by: FAMILY MEDICINE

## 2025-03-24 PROCEDURE — 3079F DIAST BP 80-89 MM HG: CPT | Performed by: FAMILY MEDICINE

## 2025-03-24 ASSESSMENT — PAIN SCALES - GENERAL: PAINLEVEL_OUTOF10: SEVERE PAIN (7)

## 2025-03-24 NOTE — PROGRESS NOTES
"Assessment & Plan     Subungual hematoma of great toe of right foot, initial encounter  It did not drain immediately. Keep covered with a band aid.  Expect the nail to fall off, but it will take months and a normal nail will grow under it. Keep the nail short and use a band aid over it to keep it from ripping off in a few months.    Flynn Bennett is a 29 year old, presenting for the following health issues:  History of Present Illness (Left great toe has Bruise/sore under toenail she thinks might be infected)      3/24/2025     9:57 AM   Additional Questions   Roomed by shannan glez     9 days ago, the patient was jumping off the edge of a bathtub and her right toe curled under. It bled under the nail right away. It felt better, then started hurting more yesterday. It never drained blood or pus.    History of Present Illness       Reason for visit:  Infected toe  Symptom onset:  1-2 weeks ago  Symptoms include:  Bruised toe  Symptom intensity:  Moderate  Symptom progression:  Worsening  Had these symptoms before:  No   She is taking medications regularly.          Objective    /80 (BP Location: Left arm, Patient Position: Sitting, Cuff Size: Adult Regular)   Pulse 88   Temp 97.3  F (36.3  C)   Resp 12   Ht 1.549 m (5' 1\")   Wt 69.9 kg (154 lb)   LMP 02/27/2025 (Approximate)   SpO2 99%   Breastfeeding No   BMI 29.10 kg/m    Body mass index is 29.1 kg/m .  Physical Exam   GENERAL: healthy, alert and no distress  EYES: grossly normal to inspection, and conjunctivae and sclerae normal  RESP: breathing unlabored, no cough or throat clearing.  MS: no gross musculoskeletal defects noted.  SKIN: no suspicious lesions or rashes visible. The right great toenail has a subungual hematoma that encompasses the entire plate and matrix. No sign of infection, redness or pus.  NEURO: Normal strength and tone, mentation intact and speech normal  PSYCH: mentation appears normal, affect normal/bright   Signed " Electronically by: TIP DEL REAL MD

## 2025-03-24 NOTE — PROCEDURES
Discussed the procedure and received consent. Explained I would drill with the needle until there was a hole through the nail to allow drainage of the liquifacted blood.  The nail was swabbed with alcohol. I spun an 18 nura needle in the nailbed several millimeters past the nail plate until a pinhole in the nail went all the way through. No serosanguinous fluid came out initially. A band aid was placed over the nail.  The patient was initially sitting up, but reported dizziness and laid back on the bed for the rest of the procedure. It was otherwise tolerated well.